# Patient Record
Sex: FEMALE | Race: WHITE | Employment: PART TIME | URBAN - NONMETROPOLITAN AREA
[De-identification: names, ages, dates, MRNs, and addresses within clinical notes are randomized per-mention and may not be internally consistent; named-entity substitution may affect disease eponyms.]

---

## 2017-01-06 ENCOUNTER — OFFICE VISIT (OUTPATIENT)
Dept: FAMILY MEDICINE | Facility: OTHER | Age: 37
End: 2017-01-06
Attending: FAMILY MEDICINE
Payer: COMMERCIAL

## 2017-01-06 VITALS
HEART RATE: 110 BPM | SYSTOLIC BLOOD PRESSURE: 104 MMHG | HEIGHT: 65 IN | DIASTOLIC BLOOD PRESSURE: 64 MMHG | WEIGHT: 158 LBS | BODY MASS INDEX: 26.33 KG/M2 | TEMPERATURE: 98.3 F

## 2017-01-06 DIAGNOSIS — M79.604 BILATERAL LEG PAIN: ICD-10-CM

## 2017-01-06 DIAGNOSIS — F41.1 GAD (GENERALIZED ANXIETY DISORDER): ICD-10-CM

## 2017-01-06 DIAGNOSIS — M79.605 BILATERAL LEG PAIN: ICD-10-CM

## 2017-01-06 DIAGNOSIS — R00.2 PALPITATIONS: Primary | ICD-10-CM

## 2017-01-06 PROCEDURE — 72100 X-RAY EXAM L-S SPINE 2/3 VWS: CPT | Mod: TC | Performed by: RADIOLOGY

## 2017-01-06 PROCEDURE — 99215 OFFICE O/P EST HI 40 MIN: CPT | Performed by: FAMILY MEDICINE

## 2017-01-06 ASSESSMENT — PAIN SCALES - GENERAL: PAINLEVEL: NO PAIN (1)

## 2017-01-06 ASSESSMENT — ANXIETY QUESTIONNAIRES
2. NOT BEING ABLE TO STOP OR CONTROL WORRYING: MORE THAN HALF THE DAYS
IF YOU CHECKED OFF ANY PROBLEMS ON THIS QUESTIONNAIRE, HOW DIFFICULT HAVE THESE PROBLEMS MADE IT FOR YOU TO DO YOUR WORK, TAKE CARE OF THINGS AT HOME, OR GET ALONG WITH OTHER PEOPLE: SOMEWHAT DIFFICULT
6. BECOMING EASILY ANNOYED OR IRRITABLE: MORE THAN HALF THE DAYS
5. BEING SO RESTLESS THAT IT IS HARD TO SIT STILL: MORE THAN HALF THE DAYS
7. FEELING AFRAID AS IF SOMETHING AWFUL MIGHT HAPPEN: MORE THAN HALF THE DAYS
GAD7 TOTAL SCORE: 14
3. WORRYING TOO MUCH ABOUT DIFFERENT THINGS: MORE THAN HALF THE DAYS
1. FEELING NERVOUS, ANXIOUS, OR ON EDGE: MORE THAN HALF THE DAYS

## 2017-01-06 ASSESSMENT — PATIENT HEALTH QUESTIONNAIRE - PHQ9: 5. POOR APPETITE OR OVEREATING: MORE THAN HALF THE DAYS

## 2017-01-06 NOTE — PROGRESS NOTES
"  SUBJECTIVE:                                                    Joaquina Perez is a 36 year old female who presents to clinic today for the following health issues:    Chief Complaint   Patient presents with     [facility]     Pain     Anxiety         New Patient/Transfer of Care  Musculoskeletal problem/pain      Duration: 7-8 months constant past 2 months    Description  Location: bilateral legs    Intensity:  moderate    Accompanying signs and symptoms: burning pain radiates into lower legs    History  Previous similar problem: no   Previous evaluation:  none    Precipitating or alleviating factors:  Trauma or overuse: YES- was bitten by a dog this summer, anxiety makes pain worse  Aggravating factors include: sitting and laying    Therapies tried and outcome: exercising    Bilateral posterior leg pain -- hamstring and quad with periodically to shin pain -- always bilateral.  Worse with laying and sitting a lot-- notice it more afterwards  Not bothersome with laying - can sleep - noticeable   Has anxiety - in nursing and Googling-- increase anxiety due to pain  Got bit last year and dog is alive - but convinced today - has rabies  Burning sensation hamstring and into buttocks  No trauma  No weakness in legs but time overall feels week.   No back pain       With increase anxiety - get palpations- h/o panic attacks  Hospitalized in psych unit for anxiety and did day treatment   Was on lexapro--- \"does not want to be on meds\"- stopped it.  Wants to get pregnant      Sleeps well   No RLS sx - has FH of RLS and Parkinsons  Some FH of peripheral neuropathy           Problem list and histories reviewed & adjusted, as indicated.  Additional history: as documented    Problem list, Medication list, Allergies, and Medical/Social/Surgical histories reviewed in EPIC and updated as appropriate.    ROS:  C: NEGATIVE for fever, chills, change in weight  E/M: NEGATIVE for ear, mouth and throat problems  R: NEGATIVE " "for significant cough or SOB  CV: NEGATIVE for chest pain, palpitations or peripheral edema  ROS otherwise negative    OBJECTIVE:                                                    /64 mmHg  Pulse 110  Temp(Src) 98.3  F (36.8  C)  Ht 5' 4.5\" (1.638 m)  Wt 158 lb (71.668 kg)  BMI 26.71 kg/m2  Body mass index is 26.71 kg/(m^2).   GENERAL: healthy, alert, well nourished, well hydrated, no distress  HENT: ear canals- normal; TMs- normal; Nose- normal; Mouth- no ulcers, no lesions  NECK: no tenderness, no adenopathy, no asymmetry, no masses, no stiffness; thyroid- normal to palpation  RESP: lungs clear to auscultation - no rales, no rhonchi, no wheezes  CV: regular rates and rhythm, normal S1 S2, no S3 or S4 and no murmur, no click or rub -  ABDOMEN: soft, no tenderness, no  hepatosplenomegaly, no masses, normal bowel sounds  Back normal- no pain.   MS: extremities- no gross deformities noted, no edema- very normal strength/reflex/pulses in BLE   PSYCH: Alert and oriented times 3; speech- coherent , normal rate and volume; able to articulate logical thoughts, able to abstract reason, no tangential thoughts, no hallucinations or delusions, affect- anxious- good insight on her anxiety          ASSESSMENT/PLAN:                                                    (R00.2) Palpitations  (primary encounter diagnosis)  Comment: due to anxiety   Plan: First start SSRI.  Consider adjuctive therapy - BB/Tenex may help. Though need to be aware she wants to be on no meds and wishes to get pregnant.     (M79.604,  M79.605) Bilateral leg pain  Comment: Etiology unclear- labs normal-- worse due to anxiety ..  R/o sciatica/lumbar DDD causing pain.   Plan: XR Lumbar Spine 2/3 Views, MR Lumbar Spine w/o         Contrast        Will w/u with XR/MRI.  Consider PT/Neuro appt. Meds like cymbalta or neurontin may also help PEYTON -- pregnancy in future would be issue.  No s/s of MS seen.      (F41.1) PEYTON (generalized anxiety " disorder)  Comment: Big issue- long h/o since 18 yO.  Has been hospital twice - had outpt counseling.   Plan: sertraline (ZOLOFT) 50 MG tablet        Pt agreed to zoloft - safe in pregnancy.  Will go to moderate dose of 100mg.   Consider restarting counseling.  F/u in 3-4 weeks.     50 minutes was spent with patient and over 50%  of this time was spent on counseling patient regarding  illness, medication and / or treatment  options, coordinating further cares and follow ups that are needed along with resource material that will be helpful in the treatment of these issues.       See Patient Instructions    Seymour Alcantar MD  [facility]

## 2017-01-06 NOTE — NURSING NOTE
"Chief Complaint   Patient presents with     Establish Care       Initial /64 mmHg  Pulse 110  Temp(Src) 98.3  F (36.8  C)  Ht 5' 4.5\" (1.638 m)  Wt 158 lb (71.668 kg)  BMI 26.71 kg/m2 Estimated body mass index is 26.71 kg/(m^2) as calculated from the following:    Height as of this encounter: 5' 4.5\" (1.638 m).    Weight as of this encounter: 158 lb (71.668 kg).  BP completed using cuff size: radha Serra      "

## 2017-01-06 NOTE — PATIENT INSTRUCTIONS
Treating Anxiety Disorders with Medication  An anxiety disorder can make you feel nervous or apprehensive, even without a clear reason. Certain anxiety disorders can cause intense feelings of fear or panic. You may even have physical symptoms, such as a racing heartbeat or dizziness. If you have these feelings, you don t have to suffer anymore. Treatment to help you overcome your fears will likely include therapy (also called counseling). Medication may also be prescribed to help control your symptoms.    Medications  Certain medications may be prescribed to help control your symptoms. As a result, you may feel less anxious. You may also feel able to move forward with therapy. At first, medications and dosages may need to be adjusted to find what works best for you. Try to be patient. Tell your health care provider how a medication makes you feel. This way, you can work together to find the treatment that s best for you. Keep in mind that medications can have side effects. Talk to your provider about any side effects that are bothering you. Changing the dose or type of medication may help. Don t stop taking medication on your own because it can cause symptoms to come back.    Anti-anxiety medication: This medication eases symptoms and helps you relax. Your health care provider will explain when and how to use it. It may be prescribed for use before situations that makes you anxious. Or, you may be told to take it on a regular schedule. Anti-anxiety medication may make you feel a little sleepy or  out of it.  Don t drive a car or operate machinery while on this medication, until you know how it affects you.  Caution  Never use alcohol or other drugs with anti-anxiety medications. This could result in loss of muscular control, sedation, coma or death. Also, use only the amount of medication prescribed for you. If you think you may have taken too much, get emergency care right away.     Antidepressant  medication: This kind of medication is often used to treat anxiety, even if you aren t depressed. An antidepressant helps balance out brain chemicals. This helps keep anxiety under control. This medication is taken on a schedule. It takes a few weeks to start working. If you don t notice a change at first, you may just need more time. But if you don t notice results after the first few weeks, tell your provider.  Keep taking medications as prescribed  Never change your dosage or stop taking your medications without talking to your health care provider first. Keep the following in mind:    Some medications must be taken on a schedule. Make this part of your daily routine. For instance, always take your pill before brushing your teeth. A pillbox can help you remember if you ve taken your medication each day.    Medications are often taken for 6 to 12 months. Your health care provider will then evaluate whether you need to stay on them. Many people who have also had therapy may no longer need medication to manage anxiety.    You may need to stop taking medication slowly to give your body time to adjust. When it s time to stop, your health care provider will tell you more. Remember: Never stop taking your medication without talking to your provider first.    If symptoms return, you may need to start taking medications again. This isn t your fault. It s just the nature of your anxiety disorder.  Special concerns    Side effects: Medications may cause side effects. Ask your health care provider or pharmacist what you can expect. They may have ideas for avoiding some side effects.    Sexual problems: Some antidepressants can affect your desire for sex or your ability to have an orgasm. A change in dosage or medication often solves the problem. If you have a sexual side effect that concerns you, tell your health care provider.    Addiction: Antidepressants are not addictive. And if you ve never had a problem with drugs or  alcohol, you likely won t have a problem with anti-anxiety medication. But if you have history of addiction, you may need to avoid this medication.     9303-9028 [employer]. [address] [place] [address], [place] [address]. All rights reserved. This information is not intended as a substitute for professional medical care. Always follow your healthcare professional's instructions.        Treating Anxiety Disorders with Therapy  If you have an anxiety disorder, you don t have to suffer anymore. Treatment is available. Therapy (also called counseling) is often a helpful treatment for anxiety disorders. With therapy, a specially trained professional (therapist) helps you face and learn to manage your anxiety. Therapy can be short-term or long-term depending on your needs. In some cases, medication may also be prescribed with therapy. It may take time before you notice how much therapy is helping, but stick with it. With therapy, you can feel better.    Cognitive behavioral therapy (CBT)  Cognitive behavioral therapy (CBT) teaches you to manage anxiety. It does this by helping you understand how you think and act when you re anxious. Research has shown CBT to be a very effective treatment for anxiety disorders. How CBT is run is almost like a class. It involves homework and activities to build skills that teach you to cope with anxiety step by step. It can be done in a group or one-on-one, and often takes place for a set number of sessions. CBT has two main parts:    Cognitive therapy helps you identify the negative, irrational thoughts that occur with your anxiety. You ll learn to replace these with more positive, realistic thoughts.    Behavioral therapy helps you change how you react to anxiety. You ll learn coping skills and methods for relaxing to help you better deal with anxiety.  Other forms of therapy  Other therapy methods may work better for you than CBT. Or, you may move from CBT to another form of  therapy as your treatment needs change. This may mean meeting with a therapist by yourself or in a group. Therapy can also help you work through problems in your life, such as drug or alcohol dependence, that may be making your anxiety worse.  Getting better takes time  Therapy will help you feel better and teach you skills to help manage anxiety long term. But change doesn t happen right away. It takes a commitment from you. And treatment only works if you learn to face the causes of your anxiety. So, you might feel worse before you feel better. This can sometimes make it hard to stick with it. But remember: Therapy is a very effective treatment. The results will be well worth it.  Helping yourself  If anxiety is wearing you down, here are some things you can do to cope:    Don t fight your feelings. Anxiety feeds itself. The more you worry about it, the worse it gets. Instead, try to identify what might have triggered your anxiety. Then try to put this threat in perspective.    Keep in mind that you can t control everything about a situation. Change what you can and let the rest take its course.    Exercise -- it s a great way to relieve tension and help your body feel relaxed.    Examine your life for stress, and try to find ways to reduce it.    Avoid caffeine and nicotine, which can make anxiety symptoms worse.    Fight the temptation to turn to alcohol or unprescribed drugs for relief. They only make things worse in the long run.     3332-0549 [employer]. [address] [place] [address], [place] [address]. All rights reserved. This information is not intended as a substitute for professional medical care. Always follow your healthcare professional's instructions.        Your Body s Response to Anxiety  Normal anxiety is part of the body s natural defense system. It's an alert to a threat that is unknown, vague, or comes from your own internal fears. While you re in this state, your feelings can range  from a vague sense of worry to physical sensations such as a pounding heartbeat. These feelings make you want to react to the threat. An anxiety response is normal in many situations. But when you have an anxiety disorder, the same response can occur at the wrong times.    Anxiety can be helpful  Normal anxiety is a signal from your brain that warns you of a threat and is a normal response to help you prevent something or decrease the bad effects of something you can't control. For example, anxiety is a normal response to situations that might damage your body, separate you from a loved one, or lose your job. The symptoms of anxiety can be physical and mental.  How does it feel?  At certain times, people with anxiety may have:    Dizziness    Muscle tension or pain    Restlessness    Sleeplessness    Difficulty concentrating    Racing heartbeat    Fast breathing    Shaking or trembling    Stomachache    Diarrhea    Loss of energy    Sweating    Cold, clammy hands    Chest pain    Dry mouth  Anxiety can also be a problem  Anxiety can become a problem when it is difficult to control, occurs for months, and interferes with important parts of your life. With an anxiety disorder, your body has the response described above, but in inappropriate ways. The response a person has depends on the anxiety disorder he or she has. With some disorders, the anxiety is way out of proportion to the threat that triggers it. With others, anxiety may occur even when there isn t a clear threat or trigger.  Who does it affect?  Some people are more prone to persistent anxiety than others. It tends to run in families, and it affects more younger people than older people. But no age, race, or gender is immune to anxiety problems.  Anxiety can be treated  The good news is that the anxiety that s disrupting your life can be treated. Working with your doctor or other healthcare provider, you can develop skills to help you cope with anxiety. You  can also gain the perspective you need to overcome your fears. Note: Good sources of support or guidance can be found at your local hospital, mental health clinic, or an employee assistance program.     If anxiety is wearing you down, here are some things you can do to cope:    Keep in mind that you can t control everything about a situation. Change what you can and let the rest take its course.    Exercise--it s a great way to relieve tension and help your body feel relaxed.    Avoid caffeine and nicotine, which can make anxiety symptoms worse.    Fight the temptation to turn to alcohol or unprescribed drugs for relief. They only make things worse in the long run.     8139-9744 [employer]. [address] [place] [address], [place] [address]. All rights reserved. This information is not intended as a substitute for professional medical care. Always follow your healthcare professional's instructions.

## 2017-01-07 ASSESSMENT — ANXIETY QUESTIONNAIRES: GAD7 TOTAL SCORE: 14

## 2017-01-07 ASSESSMENT — PATIENT HEALTH QUESTIONNAIRE - PHQ9: SUM OF ALL RESPONSES TO PHQ QUESTIONS 1-9: 0

## 2017-01-10 ENCOUNTER — HOSPITAL ENCOUNTER (OUTPATIENT)
Dept: MRI IMAGING | Facility: HOSPITAL | Age: 37
Discharge: HOME | End: 2017-01-10
Attending: FAMILY MEDICINE | Admitting: FAMILY MEDICINE
Payer: COMMERCIAL

## 2017-01-10 PROCEDURE — 72148 MRI LUMBAR SPINE W/O DYE: CPT | Mod: TC

## 2017-01-11 DIAGNOSIS — M79.604 BILATERAL LEG PAIN: Primary | ICD-10-CM

## 2017-01-11 DIAGNOSIS — M79.605 BILATERAL LEG PAIN: Primary | ICD-10-CM

## 2017-01-13 NOTE — PROGRESS NOTES
2017            Dr. Randall Gu    [facility]   [address]   [place]  [address]      Patient:  Fahad Perez   Medical Record #: [id number]   YOB: 1980   Appointment time:  2017 at 9:45 am       RE:  Requesting consultation and possible EMG of the lower extremities.           Bilateral lower extremity pain, myalgias.      Dear Dr. Gu:      Thank you for seeing Joaquina Spain, a 36-year-old female who presents multiple times now with bilateral lower extremity discomfort and pain, myalgias and burning sensation in both legs, mostly from the back down to the knees, usually over the hamstring region.  Etiology is very much unclear, cannot get a good history of why this is going on.  I did go on to get an MRI of her back and it was quite unremarkable.  The patient is very anxious and I think this is playing somewhat of a role in her issue and it is making it even worse because of her anxiety.  Though she is quite concerned, therefore I would like a consult and possible EMG to make sure she does not have any type of neuropathy going on.      I appreciate your help.            Sincerely,      CARLEE NOBLE MD             D: 2017 16:12   T: 2017 18:51   MT: CT      Name:     JOAQUINA CANADA   MRN:      [id number]        Account:      [id number]   :      1980      Document: [id number]

## 2017-01-27 ENCOUNTER — OFFICE VISIT (OUTPATIENT)
Dept: FAMILY MEDICINE | Facility: OTHER | Age: 37
End: 2017-01-27
Attending: FAMILY MEDICINE
Payer: COMMERCIAL

## 2017-01-27 VITALS
WEIGHT: 161 LBS | HEART RATE: 66 BPM | HEIGHT: 65 IN | SYSTOLIC BLOOD PRESSURE: 98 MMHG | BODY MASS INDEX: 26.82 KG/M2 | DIASTOLIC BLOOD PRESSURE: 62 MMHG | TEMPERATURE: 98.1 F

## 2017-01-27 DIAGNOSIS — F43.0 ACUTE REACTION TO STRESS: ICD-10-CM

## 2017-01-27 DIAGNOSIS — F41.1 GAD (GENERALIZED ANXIETY DISORDER): Primary | ICD-10-CM

## 2017-01-27 DIAGNOSIS — M67.442 GANGLION CYST OF FINGER OF LEFT HAND: ICD-10-CM

## 2017-01-27 DIAGNOSIS — M79.604 BILATERAL LEG PAIN: ICD-10-CM

## 2017-01-27 DIAGNOSIS — M79.605 BILATERAL LEG PAIN: ICD-10-CM

## 2017-01-27 PROCEDURE — 99214 OFFICE O/P EST MOD 30 MIN: CPT | Performed by: FAMILY MEDICINE

## 2017-01-27 ASSESSMENT — ANXIETY QUESTIONNAIRES
3. WORRYING TOO MUCH ABOUT DIFFERENT THINGS: SEVERAL DAYS
2. NOT BEING ABLE TO STOP OR CONTROL WORRYING: NOT AT ALL
GAD7 TOTAL SCORE: 5
6. BECOMING EASILY ANNOYED OR IRRITABLE: NEARLY EVERY DAY
7. FEELING AFRAID AS IF SOMETHING AWFUL MIGHT HAPPEN: NOT AT ALL
1. FEELING NERVOUS, ANXIOUS, OR ON EDGE: SEVERAL DAYS
IF YOU CHECKED OFF ANY PROBLEMS ON THIS QUESTIONNAIRE, HOW DIFFICULT HAVE THESE PROBLEMS MADE IT FOR YOU TO DO YOUR WORK, TAKE CARE OF THINGS AT HOME, OR GET ALONG WITH OTHER PEOPLE: NOT DIFFICULT AT ALL
5. BEING SO RESTLESS THAT IT IS HARD TO SIT STILL: NOT AT ALL

## 2017-01-27 ASSESSMENT — PATIENT HEALTH QUESTIONNAIRE - PHQ9: 5. POOR APPETITE OR OVEREATING: NOT AT ALL

## 2017-01-27 ASSESSMENT — PAIN SCALES - GENERAL: PAINLEVEL: MILD PAIN (2)

## 2017-01-27 NOTE — PROGRESS NOTES
"  SUBJECTIVE:                                                    Joaquina Perez is a 36 year old female who presents to clinic today for the following health issues:      Abnormal Mood Symptoms      Duration: year    Description:  Depression: no  Anxiety: YES  Panic attacks: no     Accompanying signs and symptoms: see PHQ-9 and PEYTON scores    History (similar episodes/previous evaluation): None    Precipitating or alleviating factors: None    Therapies tried and outcome: Zoloft (Sertraline) and atarax    Had insurance issue andthen did not start Zoloft yet- has at home and intends to start   PHQ-9 SCORE 12/20/2016 1/6/2017 1/27/2017   Total Score - - -   Total Score 2 0 1     PEYTON-7 SCORE 12/20/2016 1/6/2017 1/27/2017   Total Score 6 14 5              Lump in finger      Duration: 1 year    Description (location/character/radiation): left pinky finger    Intensity:  mild    Accompanying signs and symptoms: none    History (similar episodes/previous evaluation): None    Precipitating or alleviating factors: None    Therapies tried and outcome: None       Pt is having less leg pain with chiropractor and changes in school and home work.  Doing more stretches and exercises.     Problem list and histories reviewed & adjusted, as indicated.  Additional history: as documented    Problem list, Medication list, Allergies, and Medical/Social/Surgical histories reviewed in EPIC and updated as appropriate.    ROS:  C: NEGATIVE for fever, chills, change in weight    OBJECTIVE:                                                    BP 98/62 mmHg  Pulse 66  Temp(Src) 98.1  F (36.7  C)  Ht 5' 4.5\" (1.638 m)  Wt 161 lb (73.029 kg)  BMI 27.22 kg/m2  Body mass index is 27.22 kg/(m^2).   GENERAL: healthy, alert, well nourished, well hydrated, no distress  MS: extremities- no gross deformities noted, no edema left 5th finger 5-7 mm cystic lesion possible ganglion cyst   PSYCH: Alert and oriented times 3; speech- coherent , normal " rate and volume; able to articulate logical thoughts, able to abstract reason, no tangential thoughts, no hallucinations or delusions, affect- normal- much less anxious         ASSESSMENT/PLAN:                                                      (F41.1) PEYTON (generalized anxiety disorder)  (primary encounter diagnosis)  Comment: improved some   Plan: Still think Zoloft would be helpful.  Pt to start and see back in 6-8 weeks    (F43.0) Acute reaction to stress  Comment: improved   Plan: Symptomatic treatment was discussed along when patient should call and/or come back into the clinic or go to ER/Urgent care. All questions answered.   No counseling wanted    (M79.604,  M79.605) Bilateral leg pain  Comment: improved with some changes in sitting and going to chiropractor.   Plan: Continue current medications and behavioral changes.   Discussed in length conservative measures of OTC medications for pain, Ice/Heat, elevation and the concept of R.I.C.E.. Continue behavioral changes and proper body mechanics to allow for healing. Follow up as directed.   Possible  she will cancer EMG- i support that     (M63.892) Ganglion cyst of finger of left hand  Comment: discussed at length - benign   Plan: Reassurance       See Patient Instructions    Seymour Alcantar MD  [facility]

## 2017-01-27 NOTE — NURSING NOTE
"Chief Complaint   Patient presents with     Anxiety     Mass       Initial BP 98/62 mmHg  Pulse 66  Temp(Src) 98.1  F (36.7  C)  Ht 5' 4.5\" (1.638 m)  Wt 161 lb (73.029 kg)  BMI 27.22 kg/m2 Estimated body mass index is 27.22 kg/(m^2) as calculated from the following:    Height as of this encounter: 5' 4.5\" (1.638 m).    Weight as of this encounter: 161 lb (73.029 kg).  BP completed using cuff size: radha Serra      "

## 2017-01-28 ASSESSMENT — ANXIETY QUESTIONNAIRES: GAD7 TOTAL SCORE: 5

## 2017-01-28 ASSESSMENT — PATIENT HEALTH QUESTIONNAIRE - PHQ9: SUM OF ALL RESPONSES TO PHQ QUESTIONS 1-9: 1

## 2017-02-17 ENCOUNTER — TELEPHONE (OUTPATIENT)
Dept: OBGYN | Facility: OTHER | Age: 37
End: 2017-02-17

## 2017-02-17 NOTE — TELEPHONE ENCOUNTER
Positive pregnancy test : yes  LMP : 1/21/17 GA: 3 weeks and 6 days  Prenatal vitamins?: yes  Bleeding?: no  Cramping?: no  1-sided pelvic pain?: no  Advised patient to be seen ASAP if any of the above symptoms.  [facility] appt scheduled with Dr. Jacobs on 3/6/17 at 1:50.

## 2017-03-06 ENCOUNTER — PRENATAL OFFICE VISIT (OUTPATIENT)
Dept: OBGYN | Facility: OTHER | Age: 37
End: 2017-03-06
Attending: OBSTETRICS & GYNECOLOGY
Payer: COMMERCIAL

## 2017-03-06 VITALS
BODY MASS INDEX: 27.66 KG/M2 | DIASTOLIC BLOOD PRESSURE: 64 MMHG | HEART RATE: 76 BPM | WEIGHT: 166 LBS | HEIGHT: 65 IN | SYSTOLIC BLOOD PRESSURE: 104 MMHG

## 2017-03-06 DIAGNOSIS — Z32.01 PREGNANCY TEST POSITIVE: Primary | ICD-10-CM

## 2017-03-06 PROBLEM — O09.521 ELDERLY MULTIGRAVIDA IN FIRST TRIMESTER: Status: ACTIVE | Noted: 2017-03-06

## 2017-03-06 PROCEDURE — 76817 TRANSVAGINAL US OBSTETRIC: CPT | Performed by: OBSTETRICS & GYNECOLOGY

## 2017-03-06 PROCEDURE — 99207 ZZC PRENATAL VISIT: CPT | Mod: 25 | Performed by: OBSTETRICS & GYNECOLOGY

## 2017-03-06 NOTE — MR AVS SNAPSHOT
After Visit Summary   3/6/2017    Joaquina Perez    MRN: [id number]           Patient Information     Date Of Birth          1980        Visit Information        Provider Department      3/6/2017 1:50 PM Geetha Jacobs MD [facility]        Today's Diagnoses     Pregnancy test positive    -  1      Care Instructions    Return in 1-2 weeks for a short appointment, and in 6 weeks for a long New OB appt.  Plan wet prep and cell-free DNA tests after 10 weeks.           Follow-ups after your visit        Your next 10 appointments already scheduled     Mar 07, 2017  9:10 AM CST   (Arrive by 8:55 AM)   SHORT with MD Ilana Sinhaview [place] ([facility] [place] [facility])    [address]  [place] [address]   [phone]            Mar 17, 2017  9:30 AM CDT   (Arrive by 9:15 AM)   Office Visit with Seymour Alcantar MD   [facility] [place] ([facility] [place] [facility])    [address] [place] [address]   [phone]           Bring a current list of meds and any records pertaining to this visit.  For Physicals, please bring immunization records and any forms needing to be filled out.  Please arrive 10 minutes early to complete paperwork.            Feb 07, 2018 10:00 AM CST   (Arrive by 9:45 AM)   PHYSICAL with MD Aleks Sinha [place] ([facility] [place] [facility])    [address]  [place] [address]   [phone]              Who to contact     If you have questions or need follow up information about today's clinic visit or your schedule please [facility] at [phone].  Normal or non-critical lab and imaging results will be communicated to you by MyChart, letter or phone within 4 business days after the clinic has received the results. If you do not hear from us within 7 days, please contact the clinic through MyChart or phone. If you have a critical or abnormal lab result, we will notify you by phone as soon as  "possible.  Submit refill requests through [email or web address] or call your pharmacy and they will forward the refill request to us. Please allow 3 business days for your refill to be completed.          Additional Information About Your Visit        [employer] Information     [email or web address] gives you secure access to your electronic health record. If you see a primary care provider, you can also send messages to your care team and make appointments. If you have questions, please call your primary care clinic.  If you do not have a primary care provider, please call [phone] and they will assist you.        Care EveryWhere ID     This is your Care EveryWhere ID. This could be used by other organizations to access your [place] medical records  [id number]        Your Vitals Were     Pulse Height Last Period BMI (Body Mass Index)          76 5' 4.5\" (1.638 m) 01/21/2017 28.05 kg/m2         Blood Pressure from Last 3 Encounters:   03/06/17 104/64   01/27/17 98/62   01/06/17 104/64    Weight from Last 3 Encounters:   03/06/17 166 lb (75.3 kg)   01/27/17 161 lb (73 kg)   01/06/17 158 lb (71.7 kg)              We Performed the Following     US OB (Clinic Only)        Primary Care Provider Office Phone # Fax #    Seymour Alcantar [phone]       [facility] [address] [facility] [address]        Thank you!     Thank you for choosing [facility]  for your care. Our goal is always to provide you with excellent care. Hearing back from our patients is one way we can continue to improve our services. Please take a few minutes to complete the written survey that you may receive in the mail after your visit with us. Thank you!             Your Updated Medication List - Protect others around you: Learn how to safely use, store and throw away your medicines at www.disposemymeds.org.          This list is accurate as of: 3/6/17  2:43 PM.  Always use your most recent med list.                   Brand Name " Dispense Instructions for use    calcium carbonate 500 MG tablet    OS-TANIA 500 mg [place]. Ca     Take 600 mg by mouth daily       OMEGA-3 FISH OIL PO          prenatal multivitamin  plus iron 27-0.8 MG Tabs per tablet      Take 1 tablet by mouth daily       VITAMIN D3 PO      Take 800 Units by mouth daily

## 2017-03-06 NOTE — PROGRESS NOTES
"Here for doc of dates and viability  /64  Pulse 76  Ht 5' 4.5\" (1.638 m)  Wt 166 lb (75.3 kg)  LMP 2017  BMI 28.05 kg/m2      Uterus:many calcificartions  Gest. Sac: reg 5w3d  Yolk Sac: n  Fetal Pole: n  FHT: n  Fluid: normal  Adnexa: normal  Gest Age: 5w3d  Sc less than dates  Done by Geetha Jacobs MD    A: probable IUP in AMA  Hx     P: rto 1 wk  rto NOB  Plan wet prep and cell free DNA after 10 wks    Greater than  25 minutes were spent counseling this patient face to face in addition to the ultrasound.      "

## 2017-03-06 NOTE — PATIENT INSTRUCTIONS
Return in 1-2 weeks for a short appointment, and in 6 weeks for a long New OB appt.  Plan wet prep and cell-free DNA tests after 10 weeks.

## 2017-03-13 ENCOUNTER — PRENATAL OFFICE VISIT (OUTPATIENT)
Dept: OBGYN | Facility: OTHER | Age: 37
End: 2017-03-13
Attending: OBSTETRICS & GYNECOLOGY
Payer: COMMERCIAL

## 2017-03-13 ENCOUNTER — MEDICAL CORRESPONDENCE (OUTPATIENT)
Dept: HEALTH INFORMATION MANAGEMENT | Facility: CLINIC | Age: 37
End: 2017-03-13

## 2017-03-13 VITALS
BODY MASS INDEX: 27.32 KG/M2 | WEIGHT: 164 LBS | SYSTOLIC BLOOD PRESSURE: 112 MMHG | DIASTOLIC BLOOD PRESSURE: 64 MMHG | HEIGHT: 65 IN

## 2017-03-13 DIAGNOSIS — Z98.891 HISTORY OF CESAREAN SECTION: ICD-10-CM

## 2017-03-13 DIAGNOSIS — Z30.09 ENCOUNTER FOR OTHER GENERAL COUNSELING OR ADVICE ON CONTRACEPTION: Primary | ICD-10-CM

## 2017-03-13 DIAGNOSIS — O09.521 ELDERLY MULTIGRAVIDA IN FIRST TRIMESTER: ICD-10-CM

## 2017-03-13 PROBLEM — N76.0 BV (BACTERIAL VAGINOSIS): Status: ACTIVE | Noted: 2017-03-13

## 2017-03-13 PROBLEM — B96.89 BV (BACTERIAL VAGINOSIS): Status: ACTIVE | Noted: 2017-03-13

## 2017-03-13 LAB
ABO + RH BLD: NORMAL
ABO + RH BLD: NORMAL
BLD GP AB SCN SERPL QL: NORMAL
BLOOD BANK CMNT PATIENT-IMP: NORMAL
BLOOD BANK CMNT PATIENT-IMP: NORMAL
C TRACH DNA SPEC QL PROBE+SIG AMP: NORMAL
ERYTHROCYTE [DISTWIDTH] IN BLOOD BY AUTOMATED COUNT: 11.8 % (ref 10–15)
HCT VFR BLD AUTO: 34.1 % (ref 35–47)
HGB BLD-MCNC: 12 G/DL (ref 11.7–15.7)
MCH RBC QN AUTO: 31.8 PG (ref 26.5–33)
MCHC RBC AUTO-ENTMCNC: 35.2 G/DL (ref 31.5–36.5)
MCV RBC AUTO: 91 FL (ref 78–100)
N GONORRHOEA DNA SPEC QL PROBE+SIG AMP: NORMAL
PLATELET # BLD AUTO: 217 10E9/L (ref 150–450)
RBC # BLD AUTO: 3.77 10E12/L (ref 3.8–5.2)
SPECIMEN EXP DATE BLD: NORMAL
TSH SERPL DL<=0.05 MIU/L-ACNC: 1.54 MU/L (ref 0.4–4)
WBC # BLD AUTO: 6 10E9/L (ref 4–11)

## 2017-03-13 PROCEDURE — 84443 ASSAY THYROID STIM HORMONE: CPT | Performed by: OBSTETRICS & GYNECOLOGY

## 2017-03-13 PROCEDURE — 86850 RBC ANTIBODY SCREEN: CPT | Performed by: OBSTETRICS & GYNECOLOGY

## 2017-03-13 PROCEDURE — 86900 BLOOD TYPING SEROLOGIC ABO: CPT | Performed by: OBSTETRICS & GYNECOLOGY

## 2017-03-13 PROCEDURE — 86762 RUBELLA ANTIBODY: CPT | Mod: 90 | Performed by: OBSTETRICS & GYNECOLOGY

## 2017-03-13 PROCEDURE — 76817 TRANSVAGINAL US OBSTETRIC: CPT | Performed by: OBSTETRICS & GYNECOLOGY

## 2017-03-13 PROCEDURE — 86901 BLOOD TYPING SEROLOGIC RH(D): CPT | Performed by: OBSTETRICS & GYNECOLOGY

## 2017-03-13 PROCEDURE — 85027 COMPLETE CBC AUTOMATED: CPT | Performed by: OBSTETRICS & GYNECOLOGY

## 2017-03-13 PROCEDURE — 87340 HEPATITIS B SURFACE AG IA: CPT | Mod: 90 | Performed by: OBSTETRICS & GYNECOLOGY

## 2017-03-13 PROCEDURE — 87389 HIV-1 AG W/HIV-1&-2 AB AG IA: CPT | Mod: 90 | Performed by: OBSTETRICS & GYNECOLOGY

## 2017-03-13 PROCEDURE — 36415 COLL VENOUS BLD VENIPUNCTURE: CPT | Performed by: OBSTETRICS & GYNECOLOGY

## 2017-03-13 PROCEDURE — 86780 TREPONEMA PALLIDUM: CPT | Mod: 90 | Performed by: OBSTETRICS & GYNECOLOGY

## 2017-03-13 PROCEDURE — 99207 ZZC COMPLICATED OB VISIT: CPT | Performed by: OBSTETRICS & GYNECOLOGY

## 2017-03-13 PROCEDURE — 99000 SPECIMEN HANDLING OFFICE-LAB: CPT | Performed by: OBSTETRICS & GYNECOLOGY

## 2017-03-13 NOTE — PROGRESS NOTES
Here for doc of dates and viability    Uterus: normal  Gest. Sac: reg  Yolk Sac: p  Fetal Pole: 6w3d  FHT: +  Fluid: normal  Gest Age: 6w3d  Sc with no dates  Done by Geetha Jacobs MD    A: IUP in AMA with hx BV and previous     P: rto 1 wk  rto NOB  Prenatal labs,tsh  Can use 2015 gc,ct  Cell free DNA after 10 wks  Level II for placentation 20 wks  msafp 16 wks        Greater than  25 minutes were spent counseling this patient face to face in addition to the ultrasound.

## 2017-03-13 NOTE — PATIENT INSTRUCTIONS
Labs today.  Cell-Free DNA after 10 weeks.  MSAFP at 15-16 weeks.  Level 2 ultrasound @ 18-22 weeks. You will receive a call from ultrasound to make this appt. Please contact the nurse if you have not been contacted with an appointment time by 17 weeks.  Return in 1-2 weeks for a short appointment, and in 4-6 weeks for a long New OB appt.

## 2017-03-13 NOTE — MR AVS SNAPSHOT
After Visit Summary   3/13/2017    Joaquina Perez    MRN: [id number]           Patient Information     Date Of Birth          1980        Visit Information        Provider Department      3/13/2017 3:20 PM Geetha Jacobs MD [facility]        Today's Diagnoses     Encounter for other general counseling or advice on contraception    -  1    Elderly multigravida in first trimester          Care Instructions    Labs today.  Cell-Free DNA after 10 weeks.  MSAFP at 15-16 weeks.  Level 2 ultrasound @ 18-22 weeks. You will receive a call from ultrasound to make this appt. Please contact the nurse if you have not been contacted with an appointment time by 17 weeks.  Return in -2 weeks for a short appointment, and in 4-6 weeks for a long New OB appt.          Follow-ups after your visit        Your next 10 appointments already scheduled     Mar 17, 2017  9:30 AM CDT   (Arrive by 9:15 AM)   Office Visit with Seymour Alcantar MD   [facility] [place] ([facility] [place] [facility])    [address]  [place] [address]   [phone]           Bring a current list of meds and any records pertaining to this visit.  For Physicals, please bring immunization records and any forms needing to be filled out.  Please arrive 10 minutes early to complete paperwork.            Apr 19, 2017  3:30 PM CDT   (Arrive by 3:15 PM)   [facility] [place] [facility] [place] [facility]    [address]  [place] [address]   [phone]            Feb 07, 2018 10:00 AM CST   (Arrive by 9:45 AM)   [facility] [place] [facility] [facility]    [address]  [place] [address]   [phone]              Who to contact     If you have questions or need follow up information about today's clinic visit or your schedule please contact [facility] directly at [phone].  Normal or non-critical lab and imaging results will  "be communicated to you by MyChart, letter or phone within 4 business days after the clinic has received the results. If you do not hear from us within 7 days, please contact the clinic through Yottaa or phone. If you have a critical or abnormal lab result, we will notify you by phone as soon as possible.  Submit refill requests through [employer] or call your pharmacy and they will forward the refill request to us. Please allow 3 business days for your refill to be completed.          Additional Information About Your Visit        [employer] Information     Yottaa gives you secure access to your electronic health record. If you see a primary care provider, you can also send messages to your care team and make appointments. If you have questions, please call your primary care clinic.  If you do not have a primary care provider, please call [phone] and they will assist you.        Care EveryWhere ID     This is your Care EveryWhere ID. This could be used by other organizations to access your [place] medical records  [id number]        Your Vitals Were     Height BMI (Body Mass Index)                5' 4.5\" (1.638 m) 27.72 kg/m2           Blood Pressure from Last 3 Encounters:   03/13/17 112/64   03/06/17 104/64   01/27/17 98/62    Weight from Last 3 Encounters:   03/13/17 164 lb (74.4 kg)   03/06/17 166 lb (75.3 kg)   01/27/17 161 lb (73 kg)              We Performed the Following     ABO/Rh type and screen     Anti Treponema     CBC with platelets     Hepatitis B surface antigen     HIV Antigen Antibody Combo     Rubella Antibody IgG Quantitative     TSH     UA with Microscopic reflex to Culture     US OB (Clinic Only)        Primary Care Provider Office Phone # Fax #    Seymour Alcantar [phone]       [facility] [address] [facility] [place] [address]        Thank you!     Thank you for choosing [facility]  for your care. Our goal is always to provide you with excellent " care. Hearing back from our patients is one way we can continue to improve our services. Please take a few minutes to complete the written survey that you may receive in the mail after your visit with us. Thank you!             Your Updated Medication List - Protect others around you: Learn how to safely use, store and throw away your medicines at www.disposemymeds.org.          This list is accurate as of: 3/13/17  4:15 PM.  Always use your most recent med list.                   Brand Name Dispense Instructions for use    calcium carbonate 500 MG tablet    OS-TANIA 500 mg [place]. Ca     Take 600 mg by mouth daily       OMEGA-3 FISH OIL PO          prenatal multivitamin  plus iron 27-0.8 MG Tabs per tablet      Take 1 tablet by mouth daily       VITAMIN D3 PO      Take 800 Units by mouth daily

## 2017-03-15 LAB
HBV SURFACE AG SERPL QL IA: NONREACTIVE
HIV 1+2 AB+HIV1 P24 AG SERPL QL IA: NORMAL
RUBV IGG SERPL IA-ACNC: 22 IU/ML
T PALLIDUM IGG+IGM SER QL: NEGATIVE

## 2017-03-16 ENCOUNTER — TELEPHONE (OUTPATIENT)
Dept: FAMILY MEDICINE | Facility: OTHER | Age: 37
End: 2017-03-16

## 2017-03-16 NOTE — TELEPHONE ENCOUNTER
12:14 PM    Reason for Call: Phone Call    Description: Pt called and wants to know if she should make another follow up with Dr. Alcantar regarding her Zolaf she wants to know if she has to follow up still with him on this even if she is pregnant now and she stopped taking it, she would like a call back.     Was an appointment offered for this call? No    Preferred method for responding to this message: Telephone Loxc-[phone]    If we cannot reach you directly, may we leave a detailed response at the number you provided? Yes    Can this message wait until your PCP/provider returns, if available today? Not applicable,     Astrid Jim

## 2017-03-16 NOTE — TELEPHONE ENCOUNTER
Thanks for calling and if doing well. i do not need to see.  To note Zoloft is safe in pregnancy if needed it. F/u prn.   Congrats on pregnancy !!

## 2017-03-22 ENCOUNTER — PRENATAL OFFICE VISIT (OUTPATIENT)
Dept: OBGYN | Facility: OTHER | Age: 37
End: 2017-03-22
Attending: OBSTETRICS & GYNECOLOGY
Payer: COMMERCIAL

## 2017-03-22 VITALS
BODY MASS INDEX: 26.82 KG/M2 | SYSTOLIC BLOOD PRESSURE: 100 MMHG | DIASTOLIC BLOOD PRESSURE: 62 MMHG | HEIGHT: 65 IN | WEIGHT: 161 LBS

## 2017-03-22 DIAGNOSIS — O09.521 ELDERLY MULTIGRAVIDA IN FIRST TRIMESTER: Primary | ICD-10-CM

## 2017-03-22 LAB
ALBUMIN UR-MCNC: NEGATIVE MG/DL
APPEARANCE UR: CLEAR
BACTERIA #/AREA URNS HPF: ABNORMAL /HPF
BILIRUB UR QL STRIP: NEGATIVE
COLOR UR AUTO: ABNORMAL
GLUCOSE UR STRIP-MCNC: NEGATIVE MG/DL
HGB UR QL STRIP: NEGATIVE
KETONES UR STRIP-MCNC: NEGATIVE MG/DL
LEUKOCYTE ESTERASE UR QL STRIP: NEGATIVE
NITRATE UR QL: NEGATIVE
PH UR STRIP: 6.5 PH (ref 4.7–8)
RBC #/AREA URNS AUTO: 0 /HPF (ref 0–2)
SP GR UR STRIP: 1 (ref 1–1.03)
URN SPEC COLLECT METH UR: ABNORMAL
UROBILINOGEN UR STRIP-MCNC: NORMAL MG/DL (ref 0–2)
WBC #/AREA URNS AUTO: <1 /HPF (ref 0–2)

## 2017-03-22 PROCEDURE — 99207 ZZC COMPLICATED OB VISIT: CPT | Mod: 25 | Performed by: OBSTETRICS & GYNECOLOGY

## 2017-03-22 PROCEDURE — 76817 TRANSVAGINAL US OBSTETRIC: CPT | Performed by: OBSTETRICS & GYNECOLOGY

## 2017-03-22 PROCEDURE — 81001 URINALYSIS AUTO W/SCOPE: CPT | Performed by: OBSTETRICS & GYNECOLOGY

## 2017-03-22 NOTE — PROGRESS NOTES
Here for doc of dates and viability    Uterus: normal  Gest. Sac: reg  Yolk Sac: p  Fetal Pole: 8w2d  FHT: +  Fluid: normal  Gest Age: reg  Sc =sc with no dates  Done by Geetha Jacobs MD    A: erlina iup    P: patient checking on Zika for SO  Cell free DNA after 10 wks  rto 1 wk  Note for separate testing written    Greater than  25 minutes were spent counseling this patient face to face in addition to the ultrasound.

## 2017-03-22 NOTE — MR AVS SNAPSHOT
After Visit Summary   3/22/2017    Joaquina Perez    MRN: [id number]           Patient Information     Date Of Birth          1980        Visit Information        Provider Department      3/22/2017 10:20 AM Geetha Jacobs MD [facility]        Today's Diagnoses     Elderly multigravida in first trimester    -  1      Care Instructions    Return to clinic in 1 week.          Follow-ups after your visit        Your next 10 appointments already scheduled     Mar 29, 2017  9:50 AM CDT   (Arrive by 9:35 AM)   ESTABLISHED PRENATAL with Geetha Jacobs MD   [facility] ([facility])    [address] [place] [address]   [phone]            Apr 19, 2017  3:30 PM CDT   (Arrive by 3:15 PM)   New Prenatal with Geetha Jacobs MD   [facility] ([facility])    [address] [place] [address]   [phone]            Jun 20, 2017  9:00 AM CDT   Radiology with HI ULTRASOUND 2   [facility] ([facility] )    [address] [place] [address]   [phone]            Feb 07, 2018 10:00 AM CST   (Arrive by 9:45 AM)   PHYSICAL with Geetha Jacobs MD   [facility]    [address] [place] [address]   [phone]              Who to contact     If you have questions or need follow up information about today's clinic visit or your schedule please contact [facility] directly at [phone].  Normal or non-critical lab and imaging results will be communicated to you by MyChart, letter or phone within 4 business days after the clinic has received the results. If you do not hear from us within 7 days, please contact the clinic through [employer] or phone. If you have a critical or abnormal lab result, we will notify you by phone as soon as possible.  Submit refill requests through [employer] or call your pharmacy and they will forward the refill request to us. Please allow 3  "business days for your refill to be completed.          Additional Information About Your Visit        MyChart Information     [employer] gives you secure access to your electronic health record. If you see a primary care provider, you can also send messages to your care team and make appointments. If you have questions, please call your primary care clinic.  If you do not have a primary care provider, please call [phone] and they will assist you.        Care EveryWhere ID     This is your Care EveryWhere ID. This could be used by other organizations to access your [place] medical records  [id number]        Your Vitals Were     Height BMI (Body Mass Index)                5' 4.5\" (1.638 m) 27.21 kg/m2           Blood Pressure from Last 3 Encounters:   03/22/17 100/62   03/13/17 112/64   03/06/17 104/64    Weight from Last 3 Encounters:   03/22/17 161 lb (73 kg)   03/13/17 164 lb (74.4 kg)   03/06/17 166 lb (75.3 kg)              We Performed the Following     UA with Microscopic reflex to Culture     US OB (Clinic Only)        Primary Care Provider Office Phone # Fax #    Seymour Alcantar [phone]       [facility] [address] [facility]  [place] [address]        Thank you!     Thank you for choosing [facility]  for your care. Our goal is always to provide you with excellent care. Hearing back from our patients is one way we can continue to improve our services. Please take a few minutes to complete the written survey that you may receive in the mail after your visit with us. Thank you!             Your Updated Medication List - Protect others around you: Learn how to safely use, store and throw away your medicines at www.disposemymeds.org.          This list is accurate as of: 3/22/17  4:28 PM.  Always use your most recent med list.                   Brand Name Dispense Instructions for use    calcium carbonate 500 MG tablet    OS-TANIA 500 mg [place]. Ca     Take 600 mg by " mouth daily       OMEGA-3 FISH OIL PO          prenatal multivitamin  plus iron 27-0.8 MG Tabs per tablet      Take 1 tablet by mouth daily       VITAMIN D3 PO      Take 800 Units by mouth daily

## 2017-03-29 ENCOUNTER — HOSPITAL ENCOUNTER (OUTPATIENT)
Dept: ULTRASOUND IMAGING | Facility: HOSPITAL | Age: 37
Discharge: HOME | End: 2017-03-29
Attending: OBSTETRICS & GYNECOLOGY | Admitting: OBSTETRICS & GYNECOLOGY
Payer: COMMERCIAL

## 2017-03-29 ENCOUNTER — PRENATAL OFFICE VISIT (OUTPATIENT)
Dept: OBGYN | Facility: OTHER | Age: 37
End: 2017-03-29
Attending: OBSTETRICS & GYNECOLOGY
Payer: COMMERCIAL

## 2017-03-29 VITALS
BODY MASS INDEX: 26.99 KG/M2 | HEIGHT: 65 IN | SYSTOLIC BLOOD PRESSURE: 108 MMHG | DIASTOLIC BLOOD PRESSURE: 72 MMHG | OXYGEN SATURATION: 99 % | WEIGHT: 162 LBS | HEART RATE: 79 BPM

## 2017-03-29 DIAGNOSIS — O02.1 MISSED ABORTION: ICD-10-CM

## 2017-03-29 DIAGNOSIS — O09.521 ELDERLY MULTIGRAVIDA IN FIRST TRIMESTER: Primary | ICD-10-CM

## 2017-03-29 PROCEDURE — 76817 TRANSVAGINAL US OBSTETRIC: CPT | Performed by: OBSTETRICS & GYNECOLOGY

## 2017-03-29 PROCEDURE — 76817 TRANSVAGINAL US OBSTETRIC: CPT | Mod: TC

## 2017-03-29 PROCEDURE — 99215 OFFICE O/P EST HI 40 MIN: CPT | Mod: 25 | Performed by: OBSTETRICS & GYNECOLOGY

## 2017-03-29 RX ORDER — MISOPROSTOL 200 UG/1
400 TABLET ORAL EVERY 4 HOURS PRN
Qty: 8 TABLET | Refills: 0
Start: 2017-03-29 | End: 2017-04-05

## 2017-03-29 ASSESSMENT — PAIN SCALES - GENERAL: PAINLEVEL: NO PAIN (0)

## 2017-03-29 NOTE — NURSING NOTE
"Chief Complaint   Patient presents with     Prenatal Care       Initial /72 (BP Location: Left arm, Patient Position: Chair, Cuff Size: Adult Regular)  Pulse 79  Ht 5' 4.5\" (1.638 m)  Wt 162 lb (73.5 kg)  SpO2 99%  BMI 27.38 kg/m2 Estimated body mass index is 27.38 kg/(m^2) as calculated from the following:    Height as of this encounter: 5' 4.5\" (1.638 m).    Weight as of this encounter: 162 lb (73.5 kg).  Medication Reconciliation: trinity Garner      "

## 2017-03-29 NOTE — PATIENT INSTRUCTIONS
Cytotec 200 mcg tablets given.  Dissolve 2 tablets under tongue every 4 hours x 4 doses when you are ready.  Return to clinic on Monday.

## 2017-03-29 NOTE — PROGRESS NOTES
"Here for doc of dates and viability  /72 (BP Location: Left arm, Patient Position: Chair, Cuff Size: Adult Regular)  Pulse 79  Ht 5' 4.5\" (1.638 m)  Wt 162 lb (73.5 kg)  SpO2 99%  BMI 27.38 kg/m2  A+    Uterus: normal  Gest. Sac: reg  Yolk Sac: p  Fetal Pole: 7-8 wks, fading  FHT: n  Fluid: normal  Gest Age: <dates  Sc < dates  Done by Geetha Jacobs MD    A: SORAYA    P: to radiology  Grieving discussed  Future discussed  Options discussed at length  rto Monday  ED parameters  cytotec 400 mcg SL q 4 hrs x 4 when patient wants  RBAQ's    Greater than  40 minutes were spent counseling this patient face to face in addition to the ultrasound in 2 visits      "

## 2017-03-29 NOTE — MR AVS SNAPSHOT
After Visit Summary   3/29/2017    Joaquina Perez    MRN: [id number]           Patient Information     Date Of Birth          1980        Visit Information        Provider Department      3/29/2017 9:50 AM Geetha Jacobs MD [facility]        Today's Diagnoses     Elderly multigravida in first trimester    -  1    Missed           Care Instructions    Cytotec 200 mcg tablets given.  Dissolve 2 tablets under tongue every 4 hours x 4 doses when you are ready.  Return to clinic on Monday.          Follow-ups after your visit        Your next 10 appointments already scheduled     2017  1:30 PM CDT   (Arrive by 1:15 PM)   SHORT with Geetha Jacobs MD   [facility] ([facility])    [address] [facility] [address]   [phone]            2017  3:30 PM CDT   (Arrive by 3:15 PM)   New Prenatal with Geetha Jacobs MD   [facility] ([facility])    [address] [facility] [address]   [phone]            2017  9:00 AM CDT   Radiology with HI ULTRASOUND 2   [facility] ([facility] )    [address] [place] [address]   [phone]            2018 10:00 AM CST   (Arrive by 9:45 AM)   PHYSICAL with Geetha Jacobs MD   [facility] ([facility])    [address] [place] [address]   [phone]              Who to contact     If you have questions or need follow up information about today's clinic visit or your schedule please contact [facility] directly at [phone].  Normal or non-critical lab and imaging results will be communicated to you by MyChart, letter or phone within 4 business days after the clinic has received the results. If you do not hear from us within 7 days, please contact the clinic through MyChart or phone. If you have a critical or abnormal lab result, we will notify you by phone as soon as possible.  Submit  "refill requests through [employer] or call your pharmacy and they will forward the refill request to us. Please allow 3 business days for your refill to be completed.          Additional Information About Your Visit        [employer] Information     [employer] gives you secure access to your electronic health record. If you see a primary care provider, you can also send messages to your care team and make appointments. If you have questions, please call your primary care clinic.  If you do not have a primary care provider, please call [phone] and they will assist you.        Care EveryWhere ID     This is your Care EveryWhere ID. This could be used by other organizations to access your [place] medical records  [id number]        Your Vitals Were     Pulse Height Pulse Oximetry BMI (Body Mass Index)          79 5' 4.5\" (1.638 m) 99% 27.38 kg/m2         Blood Pressure from Last 3 Encounters:   03/29/17 108/72   03/22/17 100/62   03/13/17 112/64    Weight from Last 3 Encounters:   03/29/17 162 lb (73.5 kg)   03/22/17 161 lb (73 kg)   03/13/17 164 lb (74.4 kg)              We Performed the Following     US OB (Clinic Only)     US OB Transvaginal Only        Primary Care Provider Office Phone # Fax #    Seymour Alcantar [phone]       [facility] [address] [facility] [address]        Thank you!     Thank you for choosing [facility]  for your care. Our goal is always to provide you with excellent care. Hearing back from our patients is one way we can continue to improve our services. Please take a few minutes to complete the written survey that you may receive in the mail after your visit with us. Thank you!             Your Updated Medication List - Protect others around you: Learn how to safely use, store and throw away your medicines at www.disposemymeds.org.          This list is accurate as of: 3/29/17 11:44 AM.  Always use your most recent med list.                   " Brand Name Dispense Instructions for use    calcium carbonate 500 MG tablet    OS-TANIA 500 mg [place]. [place]     Take 600 mg by mouth daily       OMEGA-3 FISH OIL PO      Reported on 3/29/2017       prenatal multivitamin  plus iron 27-0.8 MG Tabs per tablet      Take 1 tablet by mouth daily

## 2017-03-30 ENCOUNTER — TELEPHONE (OUTPATIENT)
Dept: OBGYN | Facility: OTHER | Age: 37
End: 2017-03-30

## 2017-03-30 DIAGNOSIS — O02.1 MISSED ABORTION: Primary | ICD-10-CM

## 2017-03-30 DIAGNOSIS — R10.2 PELVIC CRAMPING: ICD-10-CM

## 2017-03-30 NOTE — TELEPHONE ENCOUNTER
Patient of Dr. Jacobs was seen yesterday in clinic with missed . She thinks she is now just starting to miscarry. Starting to have a little bit of bleeding and cramping that is intense. Pain is a 4-5 out of 10 right now. Has tried Tylenol without relief. She will get some Ibuprofen and try that. The pain is more intense than she expected, but still tolerable. She is afraid that it will get a lot worse since she is just starting to miscarry and is wondering if she can get an RX for something stronger than Ibuprofen in case she needs it as she does not want to go to ER later for pain medication.     If she can have RX, uses [facility]    [phone]

## 2017-03-30 NOTE — TELEPHONE ENCOUNTER
Discussed with Dr. Paez. Written RX for oxycodone 5 mg #24 1-2 q 4 hr prn pain to  for patient to .    Dr. Paez, please sign in meds and orders of this encounter so a record of this script goes into her EPIC chart. Thanks.

## 2017-03-31 RX ORDER — OXYCODONE HYDROCHLORIDE 5 MG/1
TABLET ORAL
Qty: 24 TABLET | Refills: 0
Start: 2017-03-31 | End: 2017-04-11

## 2017-04-05 ENCOUNTER — OFFICE VISIT (OUTPATIENT)
Dept: OBGYN | Facility: OTHER | Age: 37
End: 2017-04-05
Attending: OBSTETRICS & GYNECOLOGY
Payer: COMMERCIAL

## 2017-04-05 VITALS
SYSTOLIC BLOOD PRESSURE: 90 MMHG | WEIGHT: 160 LBS | HEIGHT: 65 IN | BODY MASS INDEX: 26.66 KG/M2 | DIASTOLIC BLOOD PRESSURE: 62 MMHG | HEART RATE: 76 BPM

## 2017-04-05 DIAGNOSIS — O02.1 MISSED AB: Primary | ICD-10-CM

## 2017-04-05 DIAGNOSIS — O02.1 MISSED ABORTION: ICD-10-CM

## 2017-04-05 DIAGNOSIS — O03.4 INCOMPLETE ABORTION: ICD-10-CM

## 2017-04-05 PROCEDURE — 76817 TRANSVAGINAL US OBSTETRIC: CPT | Performed by: OBSTETRICS & GYNECOLOGY

## 2017-04-05 PROCEDURE — 99214 OFFICE O/P EST MOD 30 MIN: CPT | Mod: 25 | Performed by: OBSTETRICS & GYNECOLOGY

## 2017-04-05 NOTE — PROGRESS NOTES
"Joaquina Perez is a 37 year old female who thinks she miscarried Saturday. Still bleeding. Some cramping. Not much at all today      O:   BP 90/62  Pulse 76  Ht 5' 4.5\" (1.638 m)  Wt 160 lb (72.6 kg)  BMI 27.04 kg/m2   Slight POC's in os. cx open    A:  Incomplete     P:  Suction  discussed  POC's removed  tvusg show retained poc's  Stat Quant and HP prior to visit to visit  400 mcg cytotec SL q 4 x 4  rto Monday.  ED parameters discussed  Fertility and condoms discussed      Greater than 25 minutes were spent face to face counseling this patient    Geetha Jacobs MD    "

## 2017-04-05 NOTE — MR AVS SNAPSHOT
After Visit Summary   2017    Joaquina Perez    MRN: [id number]           Patient Information     Date Of Birth          1980        Visit Information        Provider Department      2017 1:30 PM Geetha Jacobs MD [facility]        Today's Diagnoses     Missed ab    -  1    Incomplete            Follow-ups after your visit        Your next 10 appointments already scheduled     2017  3:30 PM CDT   (Arrive by 3:15 PM)   New Prenatal with Geetha Jacobs MD   [facility] [facility]    [address] [facility] [address]   [phone]            2018 10:00 AM CST   (Arrive by 9:45 AM)   PHYSICAL with Geetha Jacobs MD   [facility] ([facility])    [address]  [facility] [address]   [phone]              Future tests that were ordered for you today     Open Future Orders        Priority Expected Expires Ordered    HCG, quantitative, pregnancy STAT  2018    CBC with platelets STAT  2018            Who to contact     If you have questions or need follow up information about today's clinic visit or your schedule please contact [facility] directly at [phone].  Normal or non-critical lab and imaging results will be communicated to you by MyChart, letter or phone within 4 business days after the clinic has received the results. If you do not hear from us within 7 days, please contact the clinic through [employer] or phone. If you have a critical or abnormal lab result, we will notify you by phone as soon as possible.  Submit refill requests through [employer] or call your pharmacy and they will forward the refill request to us. Please allow 3 business days for your refill to be completed.          Additional Information About Your Visit        [employer] Information     [employer] gives you secure access to your electronic health record. If you see a primary care  "provider, you can also send messages to your care team and make appointments. If you have questions, please call your primary care clinic.  If you do not have a primary care provider, please call [phone] and they will assist you.        Care EveryWhere ID     This is your Care EveryWhere ID. This could be used by other organizations to access your [place] medical records  [id number]        Your Vitals Were     Pulse Height BMI (Body Mass Index)             76 5' 4.5\" (1.638 m) 27.04 kg/m2          Blood Pressure from Last 3 Encounters:   04/05/17 90/62   03/29/17 108/72   03/22/17 100/62    Weight from Last 3 Encounters:   04/05/17 160 lb (72.6 kg)   03/29/17 162 lb (73.5 kg)   03/22/17 161 lb (73 kg)              We Performed the Following     US OB (Clinic Only)        Primary Care Provider Office Phone # Fax #    Seymour Alcantar [phone]       [facility] [address] [facility] [address]        Thank you!     Thank you for choosing [facility]  for your care. Our goal is always to provide you with excellent care. Hearing back from our patients is one way we can continue to improve our services. Please take a few minutes to complete the written survey that you may receive in the mail after your visit with us. Thank you!             Your Updated Medication List - Protect others around you: Learn how to safely use, store and throw away your medicines at www.disposemymeds.org.          This list is accurate as of: 4/5/17  2:31 PM.  Always use your most recent med list.                   Brand Name Dispense Instructions for use    calcium carbonate 500 MG tablet    OS-TANIA 500 mg [place]. Ca     Take 600 mg by mouth daily       OMEGA-3 FISH OIL PO      Reported on 3/29/2017       oxyCODONE 5 MG IR tablet    ROXICODONE    24 tablet    1-2 po q 4 hours prn pain       prenatal multivitamin  plus iron 27-0.8 MG Tabs per tablet      Take 1 tablet by mouth daily       " VITAMIN D (CHOLECALCIFEROL) PO      Take 1,000 Units by mouth daily

## 2017-04-06 NOTE — PATIENT INSTRUCTIONS
Cytotec 200 mcg tablets given. Dissolve 2 under tongue every 4 hours x 4 doses.  Return on Monday. Stop at lab first.

## 2017-04-10 DIAGNOSIS — O02.1 MISSED AB: ICD-10-CM

## 2017-04-10 DIAGNOSIS — O03.4 INCOMPLETE ABORTION: ICD-10-CM

## 2017-04-10 LAB
B-HCG SERPL-ACNC: 116 IU/L (ref 0–5)
ERYTHROCYTE [DISTWIDTH] IN BLOOD BY AUTOMATED COUNT: 11.7 % (ref 10–15)
HCT VFR BLD AUTO: 35.5 % (ref 35–47)
HGB BLD-MCNC: 12.3 G/DL (ref 11.7–15.7)
MCH RBC QN AUTO: 31.8 PG (ref 26.5–33)
MCHC RBC AUTO-ENTMCNC: 34.6 G/DL (ref 31.5–36.5)
MCV RBC AUTO: 92 FL (ref 78–100)
PLATELET # BLD AUTO: 249 10E9/L (ref 150–450)
RBC # BLD AUTO: 3.87 10E12/L (ref 3.8–5.2)
WBC # BLD AUTO: 6.4 10E9/L (ref 4–11)

## 2017-04-10 PROCEDURE — 85027 COMPLETE CBC AUTOMATED: CPT | Performed by: OBSTETRICS & GYNECOLOGY

## 2017-04-10 PROCEDURE — 36415 COLL VENOUS BLD VENIPUNCTURE: CPT | Performed by: OBSTETRICS & GYNECOLOGY

## 2017-04-10 PROCEDURE — 84702 CHORIONIC GONADOTROPIN TEST: CPT | Performed by: OBSTETRICS & GYNECOLOGY

## 2017-04-10 RX ORDER — MISOPROSTOL 200 UG/1
400 TABLET ORAL EVERY 4 HOURS PRN
Qty: 8 TABLET | Refills: 0
Start: 2017-04-10 | End: 2017-04-11

## 2017-04-11 ENCOUNTER — TELEPHONE (OUTPATIENT)
Dept: FAMILY MEDICINE | Facility: OTHER | Age: 37
End: 2017-04-11

## 2017-04-11 ENCOUNTER — OFFICE VISIT (OUTPATIENT)
Dept: OBGYN | Facility: OTHER | Age: 37
End: 2017-04-11
Attending: OBSTETRICS & GYNECOLOGY
Payer: COMMERCIAL

## 2017-04-11 VITALS
SYSTOLIC BLOOD PRESSURE: 90 MMHG | HEIGHT: 65 IN | HEART RATE: 68 BPM | WEIGHT: 160 LBS | BODY MASS INDEX: 26.66 KG/M2 | DIASTOLIC BLOOD PRESSURE: 56 MMHG

## 2017-04-11 DIAGNOSIS — O03.4 INCOMPLETE ABORTION: ICD-10-CM

## 2017-04-11 DIAGNOSIS — M79.604 BILATERAL LEG PAIN: Primary | ICD-10-CM

## 2017-04-11 DIAGNOSIS — M79.605 BILATERAL LEG PAIN: Primary | ICD-10-CM

## 2017-04-11 DIAGNOSIS — O02.1 MISSED ABORTION: Primary | ICD-10-CM

## 2017-04-11 PROCEDURE — 99214 OFFICE O/P EST MOD 30 MIN: CPT | Mod: 25 | Performed by: OBSTETRICS & GYNECOLOGY

## 2017-04-11 PROCEDURE — 76817 TRANSVAGINAL US OBSTETRIC: CPT | Performed by: OBSTETRICS & GYNECOLOGY

## 2017-04-11 NOTE — PROGRESS NOTES
"Joaquina Perez is a 37 year old female here after second round of cytotec. Minimal bleeding.      O:   BP 90/56  Pulse 68  Ht 5' 4.5\" (1.638 m)  Wt 160 lb (72.6 kg)  BMI 27.04 kg/m2   aa  TV usg shows less in uterus    A:  Incomplete     P:  hcg weekly  Options discussed  rto preg  Cont folate  If plateaus then cytotec  No unprotected sex until after first period    Greater than 25 minutes were spent face to face counseling this patient    Geetha Jacobs MD    "

## 2017-04-11 NOTE — MR AVS SNAPSHOT
After Visit Summary   2017    Joaquina Perez    MRN: [id number]           Patient Information     Date Of Birth          1980        Visit Information        Provider Department      2017 8:20 AM Geetha Jacobs MD [facility]        Today's Diagnoses     Missed     -  1    Incomplete           Care Instructions    HCG weekly in lab  Continue folate.  Return with positive pregnancy test or as needed.         Follow-ups after your visit        Your next 10 appointments already scheduled     2018 10:00 AM CST   (Arrive by 9:45 AM)   PHYSICAL with Geetha Jacobs MD   [facility] ([facility] [place] [facility])    [address]  [place] [address]   [phone]              Future tests that were ordered for you today     Open Standing Orders        Priority Remaining Interval Expires Ordered    HCG quantitative pregnancy Routine 10/10 weekly 2018            Who to contact     If you have questions or need follow up information about today's clinic visit or your schedule please contact [facility] directly at [phone].  Normal or non-critical lab and imaging results will be communicated to you by MyChart, letter or phone within 4 business days after the clinic has received the results. If you do not hear from us within 7 days, please contact the clinic through MyChart or phone. If you have a critical or abnormal lab result, we will notify you by phone as soon as possible.  Submit refill requests through [employer] or call your pharmacy and they will forward the refill request to us. Please allow 3 business days for your refill to be completed.          Additional Information About Your Visit        MyChart Information     [employer] gives you secure access to your electronic health record. If you see a primary care provider, you can also send messages to your care team and make appointments. If you have  "questions, please call your primary care clinic.  If you do not have a primary care provider, please call [phone] and they will assist you.        Care EveryWhere ID     This is your Care EveryWhere ID. This could be used by other organizations to access your [place] medical records  [id number]        Your Vitals Were     Pulse Height BMI (Body Mass Index)             68 5' 4.5\" (1.638 m) 27.04 kg/m2          Blood Pressure from Last 3 Encounters:   04/11/17 90/56   04/05/17 90/62   03/29/17 108/72    Weight from Last 3 Encounters:   04/11/17 160 lb (72.6 kg)   04/05/17 160 lb (72.6 kg)   03/29/17 162 lb (73.5 kg)              We Performed the Following     US OB (Clinic Only)        Primary Care Provider Office Phone # Fax #    Seymour Alcantar [phone]       [address]  [facility] [address]        Thank you!     Thank you for choosing [facility]  for your care. Our goal is always to provide you with excellent care. Hearing back from our patients is one way we can continue to improve our services. Please take a few minutes to complete the written survey that you may receive in the mail after your visit with us. Thank you!             Your Updated Medication List - Protect others around you: Learn how to safely use, store and throw away your medicines at www.disposemymeds.org.          This list is accurate as of: 4/11/17  8:54 AM.  Always use your most recent med list.                   Brand Name Dispense Instructions for use    calcium carbonate 500 MG tablet    OS-TANIA 500 mg [place]. Ca     Take 600 mg by mouth daily       OMEGA-3 FISH OIL PO      Reported on 3/29/2017       prenatal multivitamin  plus iron 27-0.8 MG Tabs per tablet      Take 1 tablet by mouth daily       VITAMIN D (CHOLECALCIFEROL) PO      Take 1,000 Units by mouth daily         "

## 2017-04-11 NOTE — TELEPHONE ENCOUNTER
Reason for call:  REFERRAL   1. Concern: PT for leg  2. Have you seen a provider for this concern? Yes  3. Who? Dr Alcantar  4. When?   5. What services are you requesting? Referral for PT  Description: Patient states that she has seen Dr Alcantar in the past for leg pain and he had offered a referral for PT, but, she did not want it at that time. She would like to know if he would still do that for her or if she needs to be seen again? If you could call back at [phone]  Was an appointment offered for this a call? Not at this time  Preferred method for responding to this message: Telephone Call  If we cannot reach you directly, may we leave a detailed response at the number you provided? Yes  Can this message wait until your PCP/Provider returns if not available today? YES

## 2017-04-13 ENCOUNTER — HOSPITAL ENCOUNTER (OUTPATIENT)
Dept: PHYSICAL THERAPY | Facility: HOSPITAL | Age: 37
Setting detail: THERAPIES SERIES
End: 2017-04-13
Attending: FAMILY MEDICINE
Payer: COMMERCIAL

## 2017-04-13 PROCEDURE — 97110 THERAPEUTIC EXERCISES: CPT | Mod: GP

## 2017-04-13 PROCEDURE — 97140 MANUAL THERAPY 1/> REGIONS: CPT | Mod: GP

## 2017-04-13 PROCEDURE — 40000718 ZZHC STATISTIC PT DEPARTMENT ORTHO VISIT

## 2017-04-13 PROCEDURE — 97161 PT EVAL LOW COMPLEX 20 MIN: CPT | Mod: GP

## 2017-04-13 NOTE — PROGRESS NOTES
04/13/17 0807   General Information   Type of Visit Initial OP Ortho PT Evaluation   Start of Care Date 04/13/17   Referring Physician Dr. Alcanatr   Patient/Family Goals Statement to rid herself of pain   Orders Evaluate and Treat   Date of Order 04/13/17   Insurance Type MA   Medical Diagnosis bilateral leg pain   Surgical/Medical history reviewed Yes   Presentation and Etiology   Pertinent history of current problem (include personal factors and/or comorbidities that impact the POC) has hadpain going down legs with MRI suggesting degeneration, just had miscarraige. SHe also is in nursing school and sitting a lot. Pain is more relieved with standing.   Impairments A. Pain;F. Decreased strength and endurance   Functional Limitations perform activities of daily living;perform required work activities;perform desired leisure / sports activities   Symptom Location down bilateral legs   How/Where did it occur From Degenerative Joint Disease;From insidious onset;With repetition/overuse   Onset date of current episode/exacerbation 11/01/16   Chronicity New   Pain rating (0-10 point scale) (severe down legs)   Pain quality A. Sharp;E. Shooting   Frequency of pain/symptoms A. Constant   Pain/symptoms are: Worse during the day   Pain/symptoms exacerbated by A. Sitting   Pain/symptoms eased by E. Changing positions   Progression of symptoms since onset: Worsened   Prior Level of Function   Prior Level of Function-Mobility prior no pain   Prior Level of Function-ADLs prior no pain   Current Level of Function   Patient role/employment history B. Student;A. Employed   Living environment [facility]   Lumbar Spine/SI Objective Findings   Observation no issues with gait   Integumentary no issues   Posture rounded shoulders and forward head   Gait/Locomotion no limp   Flexion ROM full   Extension ROM functional   Right Side Bending ROM to knee   Left Side Bending ROM to knee   Lumbar ROM Comment Right on left, L5 left L4 right  FRS   Pelvic Screen + stork   Transversus Abdominus Strength (Bryce Leg Lowering-deg) +1/5   Hip Flexion (L2) Strength 4/5   Hip Abduction Strength left 5/5, right 4/5   Knee Extension (L3) Strength functional   Ankle Plantar Flexion (S1) Strength functional   Hamstring Flexibility 80 degrees   Piriformis Flexibility just past neutral   Lumbar/SI Flexibility Comments quite flexible   SLR neg each side   Slump Test neg   Segmental Mobility see above   Patellar Tendon Reflexes  +2/4   Planned Therapy Interventions   Planned Therapy Interventions neuromuscular re-education;manual therapy;joint mobilization;strengthening;stretching   Planned Therapy Interventions Comment Back in balance   Planned Modality Interventions   Planned Modality Interventions Comments modalities PRN   Clinical Impression   Criteria for Skilled Therapeutic Interventions Met yes, treatment indicated   PT Diagnosis weakness with somatic dysfunction   Influenced by the following impairments loose ligs, question coxalgia issues   Functional limitations due to impairments can't sit, has been chiropractor x several months   Clinical Presentation Stable/Uncomplicated   Clinical Presentation Rationale clinical obs   Clinical Decision Making (Complexity) Low complexity   Therapy Frequency 2 times/Week   Predicted Duration of Therapy Intervention (days/wks) 12 weeks   Risk & Benefits of therapy have been explained Yes   Patient, Family & other staff in agreement with plan of care Yes   Clinical Impression Comments Presents with weakness core and somatic dysfunction low back adn SI. I question possible coxalgia. I want her to see Vanessa for this.   Education Assessment   Preferred Learning Style Listening;Reading;Demonstration;Pictures/video   Barriers to Learning No barriers   Ortho Goal 1   Goal Identifier functional   Goal Description Client will be independent with postural correction and HEP.   Target Date 06/02/17   Ortho Goal 2   Goal Identifier  functional    Goal Description Client will be able to sit with no pain down legs or discomfort with transitions.   Target Date 07/07/17   Ortho Goal 3   Goal Identifier functional   Goal Description Client will have increase in strength transversus and hip abd by one grade for better function and return to leiMama activites with decreased pain 1/10 oe less.   Target Date 07/07/17   Total Evaluation Time   Total Evaluation Time 20   Therapy Certification   Certification date from 04/13/17   Certification date to 07/07/17

## 2017-04-17 DIAGNOSIS — O03.4 INCOMPLETE ABORTION: ICD-10-CM

## 2017-04-17 DIAGNOSIS — O02.1 MISSED ABORTION: ICD-10-CM

## 2017-04-17 LAB — B-HCG SERPL-ACNC: 18 IU/L (ref 0–5)

## 2017-04-17 PROCEDURE — 36415 COLL VENOUS BLD VENIPUNCTURE: CPT | Performed by: OBSTETRICS & GYNECOLOGY

## 2017-04-17 PROCEDURE — 84702 CHORIONIC GONADOTROPIN TEST: CPT | Performed by: OBSTETRICS & GYNECOLOGY

## 2017-04-19 ENCOUNTER — HOSPITAL ENCOUNTER (OUTPATIENT)
Dept: PHYSICAL THERAPY | Facility: HOSPITAL | Age: 37
Setting detail: THERAPIES SERIES
End: 2017-04-19
Attending: FAMILY MEDICINE
Payer: COMMERCIAL

## 2017-04-19 PROCEDURE — 40000718 ZZHC STATISTIC PT DEPARTMENT ORTHO VISIT

## 2017-04-19 PROCEDURE — 97110 THERAPEUTIC EXERCISES: CPT | Mod: GP

## 2017-04-21 ENCOUNTER — TELEPHONE (OUTPATIENT)
Dept: FAMILY MEDICINE | Facility: OTHER | Age: 37
End: 2017-04-21

## 2017-04-21 DIAGNOSIS — M79.605 BILATERAL LEG PAIN: Primary | ICD-10-CM

## 2017-04-21 DIAGNOSIS — M79.604 BILATERAL LEG PAIN: Primary | ICD-10-CM

## 2017-04-21 DIAGNOSIS — M99.05 SOMATIC DYSFUNCTION OF PELVIC REGION: ICD-10-CM

## 2017-04-21 NOTE — TELEPHONE ENCOUNTER
Requesting specialized PT orders for pelvic eval and treat. Pt agrees with this as she has already started back physical therapy

## 2017-04-24 DIAGNOSIS — O03.4 INCOMPLETE ABORTION: ICD-10-CM

## 2017-04-24 DIAGNOSIS — O02.1 MISSED ABORTION: ICD-10-CM

## 2017-04-24 LAB — B-HCG SERPL-ACNC: 3 IU/L (ref 0–5)

## 2017-04-24 PROCEDURE — 36415 COLL VENOUS BLD VENIPUNCTURE: CPT | Performed by: OBSTETRICS & GYNECOLOGY

## 2017-04-24 PROCEDURE — 84702 CHORIONIC GONADOTROPIN TEST: CPT | Performed by: OBSTETRICS & GYNECOLOGY

## 2017-04-26 ENCOUNTER — HOSPITAL ENCOUNTER (OUTPATIENT)
Dept: PHYSICAL THERAPY | Facility: HOSPITAL | Age: 37
Setting detail: THERAPIES SERIES
End: 2017-04-26
Attending: FAMILY MEDICINE
Payer: COMMERCIAL

## 2017-04-26 PROCEDURE — 97110 THERAPEUTIC EXERCISES: CPT | Mod: GP

## 2017-04-26 PROCEDURE — 40000718 ZZHC STATISTIC PT DEPARTMENT ORTHO VISIT

## 2017-04-26 PROCEDURE — 97164 PT RE-EVAL EST PLAN CARE: CPT | Mod: GP,59

## 2017-04-27 NOTE — PROGRESS NOTES
04/26/17 0900   General Information   Type of Visit OP Ortho PT Re-evaluation   Start of Care Date 04/26/17   Referring Physician Dr Alcantar   Patient/Family Goals Statement decr pain   Orders Evaluate and Treat   Date of Order 04/21/17   Insurance Type MA   Medical Diagnosis Pelvic floor dysfunction through leg and coccygeal pain, incontinence   Surgical/Medical history reviewed Yes   Precautions/Limitations other (see comments)  (recent miscarriage)   General Information Comments Pt has a 7 year old with difficult labor and delivery with Csection.    Body Part(s)   Body Part(s) Pelvic Floor Dysfunction   Presentation and Etiology   Pertinent history of current problem (include personal factors and/or comorbidities that impact the POC) This 36 y/o female is having remaining leg/back pain.    Impairments A. Pain;F. Decreased strength and endurance;E. Decreased flexibility   Functional Limitations perform activities of daily living;perform required work activities;perform desired leisure / sports activities   Symptom Location BLEs, coccygeal/buttock pain   How/Where did it occur From insidious onset;With repetition/overuse  (in [employer] school sitting)   Chronicity New   Pain quality A. Sharp;B. Dull;C. Aching;E. Shooting   Frequency of pain/symptoms B. Intermittent   Pain/symptoms are: Worse during the day   Pain/symptoms exacerbated by A. Sitting;F. Nothing;G. Certain positions   Pain/symptoms eased by D. Nothing;E. Changing positions   Progression of symptoms since onset: Worsened   Prior Level of Function   Prior Level of Function-Mobility prior no pain   Prior Level of Function-ADLs prior no pain   Current Level of Function   Patient role/employment history B. Student;A. Employed   Employment Comments part time restaurant/bar   Specific Questions   Specific Questions Pelvic Floor Dysfunction   Pelvic Floor Dysfunction Questions   Regular exercise Yes   Fluid intake-glasses/day (one glass/cup = 8oz 56   Caffeinated  "beverages-glasses/day 0   Alcoholic beverages - glasses/day 1   (wine)   Recent diet change? No   How long can you delay the need to urinate?  20 mn   How many times do you wake to urinate at night?   1   How often do you urinate during the day?   10   Can you stop the flow of urine when on the toilet?  Yes   Is the volume of urine passed usually  Medium   Do you have the sensation that you need to go to the toilet?  Yes   Do you empty your bladder frequently, before you experience the urge to pass urine?  Yes  (at nighttime)   Do you have \"triggers\" that make you feel you can't wait to go to the toilet?  Yes   Number of bladder infections last year?  1   Frequency of bowel movements:  1 every day to 1x every other    Consistency of stool?  Soft formed   Do you ignore the urge to defecate?  No   Pelvic Floor Dysfunction Objective Findings   Pain-pelvic dysfunction Pelvic pain   Areas of Tightness Piriformis;Iliopsoas;Adductors   Type of Storage Problem nocturia;urgency   Type of Emptying Problem strain to void;hesitancy   Protection needed None   Power (MMT at [employer]) (Pelvic floor mm exam deferred d/t menustration)   Medications (vitamins)   Planned Therapy Interventions   Planned Therapy Interventions manual therapy;neuromuscular re-education;strengthening;stretching   Planned Therapy Interventions Comment HEP   Planned Modality Interventions   Planned Modality Interventions Biofeedback   Planned Modality Interventions Comments prn   Clinical Impression   Criteria for Skilled Therapeutic Interventions Met yes, treatment indicated   PT Diagnosis Pelvic floor dysfunction , incontinence   Influenced by the following impairments pain, tightness, weakness   Functional limitations due to impairments difficulty sitting in class, sleeping, running   Clinical Presentation Stable/Uncomplicated   Clinical Presentation Rationale clinical obs   Clinical Decision Making (Complexity) Low complexity   Therapy Frequency 1 " time/week   Predicted Duration of Therapy Intervention (days/wks) 10 weeks   Risk & Benefits of therapy have been explained Yes   Patient, Family & other staff in agreement with plan of care Yes   Clinical Impression Comments Pt will be further assessed for PFM next visit per her consent. Pelvic floor tightness appears to be driving pain in butt, coccyx with incontinence and painful sex as most positive signs   Education Assessment   Barriers to Learning No barriers   Ortho Goal 4   Goal Identifier STG 1   Goal Description Pt will have no incontinence episodes with sneezing or when bladder is full   Target Date 06/01/17   Ortho Goal 5   Goal Identifier STG  2   Goal Description Pt will have reduced tailbone pain to 4/10 or less with sitting x 2 hours   Target Date 06/08/17   Ortho Goal 6   Goal Identifier LTG    Goal Description Pt will indep manage incontinence and pain through HEP and manual techniques as indep demonstrated   Target Date 07/06/17   Total Evaluation Time   Total Evaluation Time 25   Therapy Certification   Certification date from 04/26/17   Certification date to 07/20/17

## 2017-05-09 ENCOUNTER — HOSPITAL ENCOUNTER (OUTPATIENT)
Dept: PHYSICAL THERAPY | Facility: HOSPITAL | Age: 37
Setting detail: THERAPIES SERIES
End: 2017-05-09
Attending: FAMILY MEDICINE
Payer: COMMERCIAL

## 2017-05-09 PROCEDURE — 40000718 ZZHC STATISTIC PT DEPARTMENT ORTHO VISIT

## 2017-05-09 PROCEDURE — 97140 MANUAL THERAPY 1/> REGIONS: CPT | Mod: GP

## 2017-05-09 PROCEDURE — 97530 THERAPEUTIC ACTIVITIES: CPT | Mod: GP

## 2017-05-09 PROCEDURE — 97110 THERAPEUTIC EXERCISES: CPT | Mod: GP

## 2017-05-22 ENCOUNTER — HOSPITAL ENCOUNTER (OUTPATIENT)
Dept: PHYSICAL THERAPY | Facility: HOSPITAL | Age: 37
Setting detail: THERAPIES SERIES
End: 2017-05-22
Attending: FAMILY MEDICINE
Payer: COMMERCIAL

## 2017-05-22 PROCEDURE — 97140 MANUAL THERAPY 1/> REGIONS: CPT | Mod: GP

## 2017-05-22 PROCEDURE — 97110 THERAPEUTIC EXERCISES: CPT | Mod: GP

## 2017-05-22 PROCEDURE — 40000718 ZZHC STATISTIC PT DEPARTMENT ORTHO VISIT

## 2017-05-30 ENCOUNTER — HOSPITAL ENCOUNTER (OUTPATIENT)
Dept: PHYSICAL THERAPY | Facility: HOSPITAL | Age: 37
Setting detail: THERAPIES SERIES
End: 2017-05-30
Attending: FAMILY MEDICINE
Payer: COMMERCIAL

## 2017-05-30 PROCEDURE — 40000718 ZZHC STATISTIC PT DEPARTMENT ORTHO VISIT

## 2017-05-30 PROCEDURE — 97110 THERAPEUTIC EXERCISES: CPT | Mod: GP

## 2017-05-30 PROCEDURE — 97140 MANUAL THERAPY 1/> REGIONS: CPT | Mod: GP

## 2017-06-04 DIAGNOSIS — Z30.09 FAMILY PLANNING: ICD-10-CM

## 2017-06-04 DIAGNOSIS — F41.8 DEPRESSION WITH ANXIETY: ICD-10-CM

## 2017-06-06 RX ORDER — HYDROXYZINE HYDROCHLORIDE 25 MG/1
TABLET, FILM COATED ORAL
Qty: 30 TABLET | Refills: 3 | Status: SHIPPED | OUTPATIENT
Start: 2017-06-06 | End: 2017-07-31

## 2017-06-06 NOTE — TELEPHONE ENCOUNTER
Refill request received for Vistaril for anxiety. Has been on this since 2014. Last seen in April. Trying for a pregnancy.    Please sign or decline RX in this encounter.

## 2017-06-20 ENCOUNTER — APPOINTMENT (OUTPATIENT)
Dept: OCCUPATIONAL MEDICINE | Facility: OTHER | Age: 37
End: 2017-06-20

## 2017-07-06 ENCOUNTER — HOSPITAL ENCOUNTER (OUTPATIENT)
Dept: PHYSICAL THERAPY | Facility: HOSPITAL | Age: 37
Setting detail: THERAPIES SERIES
End: 2017-07-06
Attending: FAMILY MEDICINE
Payer: COMMERCIAL

## 2017-07-06 PROCEDURE — 97140 MANUAL THERAPY 1/> REGIONS: CPT | Mod: GP

## 2017-07-06 PROCEDURE — 97530 THERAPEUTIC ACTIVITIES: CPT | Mod: GP

## 2017-07-06 PROCEDURE — 40000718 ZZHC STATISTIC PT DEPARTMENT ORTHO VISIT

## 2017-07-06 NOTE — PROGRESS NOTES
07/06/17 0932   Signing Clinician's Name / Credentials   Signing clinician's name / credentials Rody Yuan DPT   Session Number   Session Number 7   Subjective Report   Subjective Report Patient seen [id number] for C/O leg and pelvic floor pain. In association with Davida Chen, PT. It feels like she has bone pain in her butt. 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 It really hurts in her buttocks and tailbone when she sits. Nothing seems to help. She has had chiropractic, massage and PT. She is very frustrated. She is very practive with her exercise. It was suggested she not run, and she has not, but she has been walking and using her BowFlex. Pain rated 6/10 when she is sitting, and 1/10 when she is standing. Not much has shown up on MRI except for some minor degenerative changes at L5-S1. She also was pregnant and had a miscarriage early this year.    Objective Measures   Objective Measures Objective Measure 1   Objective Measure 1   Objective Measure Somatic dysfunction   Details Bladder-uterus hypomobile in expir, tilted anteriorly, descended. Left SI jt locked and left LE 1/2 inch shorter than right. Left ASIS, IC, PSIS and IT all superior relative to right. Right sacral sulcus deep and right MO prominent. Coccyx WNL. T2-L5=NSLRR.   Therapeutic Activity   Minutes 15'   Skilled Intervention ther act   Patient Response good   Treatment Detail Discussed the importance of standing and sitting with equal weight on right and left sides to avoid re-establishing the dysfunctional mechanical pattern found today   Progress x   Manual Therapy   Minutes 30'   Skilled Intervention man ther   Patient Response good, asked appropriate questions   Treatment Detail Visceral mobilization, MET, regional indirect tissue releases   Progress Post tx bladder-uterus mobility WNL, leg lengths equal, pelvis level and SI jts equally mobile. Sacral, lumbar and thoracic segments WFL of mobility and  alignment.   Plan   Homework Stand and sit with equal weight on right and left sides   Updates to plan of care manual therapy 1-2 X week   Plan for next session Tx per re-eval   Comments   Comments Findings consistent with bladder-uterus dysfunction, left upslipped innominate with functionally short left LE, SR sacrum, and SLRR curve in T-L spine that was associated with the leg length difference   Total Session Time   Total Session Time 45'     DARYA ZavalaT

## 2017-07-11 ENCOUNTER — TELEPHONE (OUTPATIENT)
Dept: OBGYN | Facility: OTHER | Age: 37
End: 2017-07-11

## 2017-07-11 NOTE — TELEPHONE ENCOUNTER
Positive pregnancy test : yes x 4  LMP : 6/12/17  GA: 4 2/7  Prenatal vitamins?: Yes, OTC PNV. Will check folate to be sure 800 mcg or more.  Bleeding?: no  Cramping?: no  1-sided pelvic pain?: no  Advised patient to be seen ASAP if any of the above symptoms.  [facility] appt scheduled with Dr. Jacobs on 8/1 @ 8:40. I did offer patient appt sooner, but she declined.

## 2017-07-17 ENCOUNTER — OFFICE VISIT (OUTPATIENT)
Dept: FAMILY MEDICINE | Facility: OTHER | Age: 37
End: 2017-07-17
Attending: FAMILY MEDICINE
Payer: COMMERCIAL

## 2017-07-17 VITALS
WEIGHT: 157 LBS | SYSTOLIC BLOOD PRESSURE: 106 MMHG | HEIGHT: 65 IN | DIASTOLIC BLOOD PRESSURE: 68 MMHG | HEART RATE: 64 BPM | BODY MASS INDEX: 26.16 KG/M2

## 2017-07-17 DIAGNOSIS — M79.605 BILATERAL LEG PAIN: Primary | ICD-10-CM

## 2017-07-17 DIAGNOSIS — M79.604 BILATERAL LEG PAIN: Primary | ICD-10-CM

## 2017-07-17 PROCEDURE — 99212 OFFICE O/P EST SF 10 MIN: CPT

## 2017-07-17 PROCEDURE — 99214 OFFICE O/P EST MOD 30 MIN: CPT | Performed by: FAMILY MEDICINE

## 2017-07-17 ASSESSMENT — PAIN SCALES - GENERAL: PAINLEVEL: MILD PAIN (3)

## 2017-07-17 NOTE — MR AVS SNAPSHOT
After Visit Summary   7/17/2017    Joaquina Perez    MRN: [id number]           Patient Information     Date Of Birth          1980        Visit Information        Provider Department      7/17/2017 4:00 PM Seymour Alcantar MD [facility]        Today's Diagnoses     Bilateral leg pain    -  1       Follow-ups after your visit        Additional Services     NEUROLOGY ADULT REFERRAL       Your provider has referred you for the following:   Consult and BLE EMG     Please be aware that coverage of these services is subject to the terms and limitations of your health insurance plan.  Call member services at your health plan with any benefit or coverage questions.      Please bring the following with you to your appointment:    (1) Any X-Rays, CTs or MRIs which have been performed.  Contact the facility where they were done to arrange for  prior to your scheduled appointment.    (2) List of current medications  (3) This referral request   (4) Any documents/labs given to you for this referral            PHYSIATRY REFERRAL       Your provider has referred you to:[facility]   Please be aware that coverage of these services is subject to the terms and limitations of your health insurance plan.  Call member services at your health plan with any benefit or coverage questions.      Please bring the following to your appointment:  >>   Any x-rays, CTs or MRIs which have been performed.  Contact the facility where they were done to arrange for  prior to your scheduled appointment.    >>   List of current medications   >>   This referral request   >>   Any documents/labs given to you for this referral                  Your next 10 appointments already scheduled     Jul 18, 2017  8:30 AM CDT   Treatment with Rody Yuan PT   [facility] ([facility] )    [address] [place] [address]   [phone]            Jul 20, 2017  8:30 AM CDT   Treatment with  "Rody Yuan, PT   [facility] ([facility] )    [address] [place], [place] [address] [place] [address]   [phone]            Aug 01, 2017  8:40 AM CDT   (Arrive by 8:25 AM)   ESTABLISHED PRENATAL with Geetha Jacobs MD   [facility] ([facility] )    [address]  [facility] [address]   [phone]            Feb 07, 2018 10:00 AM CST   (Arrive by 9:45 AM)   PHYSICAL with Geetha Jacobs MD   [facility] ([facility] )    [address]  [facility] [address]   [phone]              Who to contact     If you have questions or need follow up information about today's clinic visit or your schedule please contact [facility] directly at [phone].  Normal or non-critical lab and imaging results will be communicated to you by MyChart, letter or phone within 4 business days after the clinic has received the results. If you do not hear from us within 7 days, please contact the clinic through Testifhart or phone. If you have a critical or abnormal lab result, we will notify you by phone as soon as possible.  Submit refill requests through Alea or call your pharmacy and they will forward the refill request to us. Please allow 3 business days for your refill to be completed.          Additional Information About Your Visit        MyChart Information     Alea gives you secure access to your electronic health record. If you see a primary care provider, you can also send messages to your care team and make appointments. If you have questions, please call your primary care clinic.  If you do not have a primary care provider, please call [phone] and they will assist you.        Care EveryWhere ID     This is your Care EveryWhere ID. This could be used by other organizations to access your [facility] medical records  [id number]        Your Vitals Were     Pulse Height BMI (Body Mass Index)             64 5' 4.5\" (1.638 m) " 26.53 kg/m2          Blood Pressure from Last 3 Encounters:   07/17/17 106/68   04/11/17 90/56   04/05/17 90/62    Weight from Last 3 Encounters:   07/17/17 157 lb (71.2 kg)   04/11/17 160 lb (72.6 kg)   04/05/17 160 lb (72.6 kg)              We Performed the Following     NEUROLOGY ADULT REFERRAL     PHYSIATRY REFERRAL        Primary Care Provider Office Phone # Fax #    Seymour Alcantar [phone]       [facility] [address]  [facility] [address]        Equal Access to Services     [facility]: Hadii aad ku hadasho Soomaali, waaxda luqadaha, qaybta kaalmada adeegyada, sherice gutierrez . So [facility] [phone].    ATENCIÓN: Si habla español, tiene a moody disposición servicios gratuitos de asistencia lingüística. [facility] [phone].    We comply with applicable federal civil rights laws and [place] laws. We do not discriminate on the basis of race, color, national origin, age, disability sex, sexual orientation or gender identity.            Thank you!     Thank you for choosing [facility]  for your care. Our goal is always to provide you with excellent care. Hearing back from our patients is one way we can continue to improve our services. Please take a few minutes to complete the written survey that you may receive in the mail after your visit with us. Thank you!             Your Updated Medication List - Protect others around you: Learn how to safely use, store and throw away your medicines at www.disposemymeds.org.          This list is accurate as of: 7/17/17  5:23 PM.  Always use your most recent med list.                   Brand Name Dispense Instructions for use Diagnosis    calcium carbonate 1250 MG tablet    OS-TANIA 500 mg [place]. Ca     Take 600 mg by mouth daily        hydrOXYzine 25 MG tablet    ATARAX    30 tablet    TAKE ONE TABLET BY MOUTH EVERY DAY AS NEEDED FOR ANXIETY    Depression with anxiety, Family planning       OMEGA-3  FISH OIL PO      Reported on 3/29/2017        prenatal multivitamin  plus iron 27-0.8 MG Tabs per tablet      Take 1 tablet by mouth daily    Family planning, Depression with anxiety       VITAMIN D (CHOLECALCIFEROL) PO      Take 1,000 Units by mouth daily

## 2017-07-17 NOTE — PROGRESS NOTES
"  SUBJECTIVE:                                                    Joaquina Perez is a 37 year old female who presents to clinic today for the following health issues:    Musculoskeletal problem/pain      Duration: November 2016    Description  Location: bilateral posterior thigh    Intensity:  moderate    Accompanying signs and symptoms: numbness and tingling    History  Previous similar problem: YES  Previous evaluation:  x-ray and MRI    Precipitating or alleviating factors:  Trauma or overuse: no   Aggravating factors include: sitting and laying down     Therapies tried and outcome: rest/inactivity, massage, exercises, chiropractor, and physical therapy     Last notes reviewed and has tried PT and chiropractor  Still problematic  Worse on sitting'  Very frustrated on this  Compliant with PT and chiropractor along with exercise and stretching.     Pt is NEWLY pregnant at 5 weeks   Anxiety about same    MRI and XR reviewed          Problem list and histories reviewed & adjusted, as indicated.  Additional history: as documented        ROS:  C: NEGATIVE for fever, chills, change in weight  R: NEGATIVE for significant cough or SOB  CV: NEGATIVE for chest pain, palpitations or peripheral edema    OBJECTIVE:                                                    /68  Pulse 64  Ht 5' 4.5\" (1.638 m)  Wt 157 lb (71.2 kg)  BMI 26.53 kg/m2  Body mass index is 26.53 kg/(m^2).   GENERAL: healthy, alert, well nourished, well hydrated, no distress  MS: extremities- LE  no gross deformities noted, no edema, good strength - points to deep medial buttock where she feels pain - can not reproduse so much with palpation but sitting is bad for her  BACK: no CVA tenderness, mild diffuse bilateral  paralumbar tenderness         ASSESSMENT/PLAN:                                                    1. Bilateral leg pain  Etiology unclear- MSK most likely - ??piriformis syndrome.  Lumbar radiculapathy doubt but still possible.  " Neuropathy doubt   - NEUROLOGY ADULT REFERRAL  - PHYSIATRY REFERRAL  After long discussion and limitations by new pregnancy.  Will ask for EMG and consult from Neuro and most likely with be not nerve in origin.  IF neuro w/u negative - move then to  PMR for help. In meantime ask PT to work on piriformis and good HEP for Piriformis muscle stretches.  Pt agrees.  No meds due to pregnancy.  Symptomatic treatment was discussed along when patient should call and/or come back into the clinic or go to ER/Urgent care. All questions answered.   25-30* minutes was spent with patient and over 50%  of this time was spent on counseling patient regarding  illness, medication and / or treatment  options, coordinating further cares and follow ups that are needed along with resource material that will be helpful in the treatment of these issues.       Seymour Alcantar MD  [facility]

## 2017-07-17 NOTE — NURSING NOTE
"Chief Complaint   Patient presents with     Musculoskeletal Problem       Initial /68  Pulse 64  Ht 5' 4.5\" (1.638 m)  Wt 157 lb (71.2 kg)  BMI 26.53 kg/m2 Estimated body mass index is 26.53 kg/(m^2) as calculated from the following:    Height as of this encounter: 5' 4.5\" (1.638 m).    Weight as of this encounter: 157 lb (71.2 kg).  Medication Reconciliation: complete   Mary Iraheta    "

## 2017-07-18 ENCOUNTER — HOSPITAL ENCOUNTER (OUTPATIENT)
Dept: PHYSICAL THERAPY | Facility: HOSPITAL | Age: 37
Setting detail: THERAPIES SERIES
End: 2017-07-18
Attending: FAMILY MEDICINE
Payer: COMMERCIAL

## 2017-07-18 PROCEDURE — 97140 MANUAL THERAPY 1/> REGIONS: CPT | Mod: GP

## 2017-07-18 PROCEDURE — 40000718 ZZHC STATISTIC PT DEPARTMENT ORTHO VISIT

## 2017-07-31 ENCOUNTER — PRENATAL OFFICE VISIT (OUTPATIENT)
Dept: OBGYN | Facility: OTHER | Age: 37
End: 2017-07-31
Attending: OBSTETRICS & GYNECOLOGY
Payer: COMMERCIAL

## 2017-07-31 VITALS
DIASTOLIC BLOOD PRESSURE: 65 MMHG | SYSTOLIC BLOOD PRESSURE: 100 MMHG | WEIGHT: 157 LBS | BODY MASS INDEX: 30.82 KG/M2 | HEART RATE: 64 BPM | HEIGHT: 60 IN

## 2017-07-31 DIAGNOSIS — Z98.891 HISTORY OF CESAREAN SECTION: ICD-10-CM

## 2017-07-31 DIAGNOSIS — Z32.01 PREGNANCY TEST POSITIVE: Primary | ICD-10-CM

## 2017-07-31 DIAGNOSIS — O09.521 ELDERLY MULTIGRAVIDA IN FIRST TRIMESTER: ICD-10-CM

## 2017-07-31 LAB
ALBUMIN UR-MCNC: 10 MG/DL
APPEARANCE UR: CLEAR
BACTERIA #/AREA URNS HPF: ABNORMAL /HPF
BILIRUB UR QL STRIP: NEGATIVE
COLOR UR AUTO: YELLOW
ERYTHROCYTE [DISTWIDTH] IN BLOOD BY AUTOMATED COUNT: 11.6 % (ref 10–15)
GLUCOSE UR STRIP-MCNC: NEGATIVE MG/DL
HCT VFR BLD AUTO: 38.5 % (ref 35–47)
HGB BLD-MCNC: 13.1 G/DL (ref 11.7–15.7)
HGB UR QL STRIP: NEGATIVE
KETONES UR STRIP-MCNC: NEGATIVE MG/DL
LEUKOCYTE ESTERASE UR QL STRIP: ABNORMAL
MCH RBC QN AUTO: 31.4 PG (ref 26.5–33)
MCHC RBC AUTO-ENTMCNC: 34 G/DL (ref 31.5–36.5)
MCV RBC AUTO: 92 FL (ref 78–100)
MUCOUS THREADS #/AREA URNS LPF: PRESENT /LPF
NITRATE UR QL: NEGATIVE
PH UR STRIP: 6.5 PH (ref 4.7–8)
PLATELET # BLD AUTO: 194 10E9/L (ref 150–450)
RBC # BLD AUTO: 4.17 10E12/L (ref 3.8–5.2)
RBC #/AREA URNS AUTO: 1 /HPF (ref 0–2)
SP GR UR STRIP: 1.02 (ref 1–1.03)
SQUAMOUS #/AREA URNS AUTO: 1 /HPF (ref 0–1)
URN SPEC COLLECT METH UR: ABNORMAL
UROBILINOGEN UR STRIP-MCNC: NORMAL MG/DL (ref 0–2)
WBC # BLD AUTO: 5.6 10E9/L (ref 4–11)
WBC #/AREA URNS AUTO: <1 /HPF (ref 0–2)

## 2017-07-31 PROCEDURE — 76817 TRANSVAGINAL US OBSTETRIC: CPT | Mod: 26 | Performed by: OBSTETRICS & GYNECOLOGY

## 2017-07-31 PROCEDURE — 76817 TRANSVAGINAL US OBSTETRIC: CPT | Mod: TC | Performed by: OBSTETRICS & GYNECOLOGY

## 2017-07-31 PROCEDURE — 81001 URINALYSIS AUTO W/SCOPE: CPT | Mod: ZL | Performed by: OBSTETRICS & GYNECOLOGY

## 2017-07-31 PROCEDURE — 99213 OFFICE O/P EST LOW 20 MIN: CPT | Mod: 25

## 2017-07-31 PROCEDURE — 36415 COLL VENOUS BLD VENIPUNCTURE: CPT | Mod: ZL | Performed by: OBSTETRICS & GYNECOLOGY

## 2017-07-31 PROCEDURE — 85027 COMPLETE CBC AUTOMATED: CPT | Mod: ZL | Performed by: OBSTETRICS & GYNECOLOGY

## 2017-07-31 PROCEDURE — 99207 ZZC COMPLICATED OB VISIT: CPT | Mod: 25 | Performed by: OBSTETRICS & GYNECOLOGY

## 2017-07-31 NOTE — MR AVS SNAPSHOT
After Visit Summary   7/31/2017    Joaquina Perez    MRN: [id number]           Patient Information     Date Of Birth          1980        Visit Information        Provider Department      7/31/2017 8:50 AM Geetha Jacobs MD [facility]        Today's Diagnoses     Pregnancy test positive    -  1    Elderly multigravida in first trimester          Care Instructions    Lab today.  Cell free DNA test after 10 weeks.  MSAFP test in lab at 16 weeks.  Level 2 ultrasound @ 18-22 weeks. You will receive a call from ultrasound to make this appt. Please contact the nurse if you have not been contacted with an appointment time by 17 weeks.  Return to clinic in 1 week.  Return for New OB visit.            Follow-ups after your visit        Your next 10 appointments already scheduled     Feb 07, 2018 10:00 AM CST   (Arrive by 9:45 AM)   PHYSICAL with Geetha Jacobs MD   [facility] ([facility] )    [address] [facility] [address]   [phone]              Who to contact     If you have questions or need follow up information about today's clinic visit or your schedule please contact [facility] directly at [phone].  Normal or non-critical lab and imaging results will be communicated to you by MyChart, letter or phone within 4 business days after the clinic has received the results. If you do not hear from us within 7 days, please contact the clinic through MyChart or phone. If you have a critical or abnormal lab result, we will notify you by phone as soon as possible.  Submit refill requests through ServiceMax or call your pharmacy and they will forward the refill request to us. Please allow 3 business days for your refill to be completed.          Additional Information About Your Visit        MyChart Information     ServiceMax gives you secure access to your electronic health record. If you see a primary care provider, you can also  send messages to your care team and make appointments. If you have questions, please call your primary care clinic.  If you do not have a primary care provider, please call [phone] and they will assist you.        Care EveryWhere ID     This is your Care EveryWhere ID. This could be used by other organizations to access your [facility] medical records  [id number]        Your Vitals Were     Pulse Height Last Period BMI (Body Mass Index)          64 5' (1.524 m) 06/12/2017 (Exact Date) 30.66 kg/m2         Blood Pressure from Last 3 Encounters:   07/31/17 100/65   07/17/17 106/68   04/11/17 90/56    Weight from Last 3 Encounters:   07/31/17 157 lb (71.2 kg)   07/17/17 157 lb (71.2 kg)   04/11/17 160 lb (72.6 kg)              We Performed the Following     CBC with platelets     UA with Microscopic reflex to Culture     US OB (Clinic Only)        Primary Care Provider Office Phone # Fax #    Seymour Alcatnar [phone]       [facility] [address]  [facility] [address]        Equal Access to Services     [facility]: Hadii aad ku hadasho Soomaali, waaxda luqadaha, qaybta kaalmada adeegyada, sherice navarro haymorgann anna gutierrez . So [facility] [phone].    ATENCIÓN: Si habla español, tiene a moody disposición servicios gratuitos de asistencia lingüística. Llame al [phone].    We comply with applicable federal civil rights laws and [place] laws. We do not discriminate on the basis of race, color, national origin, age, disability sex, sexual orientation or gender identity.            Thank you!     Thank you for choosing [facility]  for your care. Our goal is always to provide you with excellent care. Hearing back from our patients is one way we can continue to improve our services. Please take a few minutes to complete the written survey that you may receive in the mail after your visit with us. Thank you!             Your Updated Medication List - Protect others  around you: Learn how to safely use, store and throw away your medicines at www.disposemymeds.org.          This list is accurate as of: 7/31/17  9:41 AM.  Always use your most recent med list.                   Brand Name Dispense Instructions for use Diagnosis    prenatal multivitamin  plus iron 27-0.8 MG Tabs per tablet      Take 1 tablet by mouth daily    Family planning, Depression with anxiety

## 2017-07-31 NOTE — PATIENT INSTRUCTIONS
Lab today.  Cell free DNA test after 10 weeks.  MSAFP test in lab at 16 weeks.  Level 2 ultrasound @ 18-22 weeks. You will receive a call from ultrasound to make this appt. Please contact the nurse if you have not been contacted with an appointment time by 17 weeks.  Return to clinic in 1 week.  Return for New OB visit.

## 2017-07-31 NOTE — PROGRESS NOTES
Here for doc of dates and viability  /65  Pulse 64  Ht 5' (1.524 m)  Wt 157 lb (71.2 kg)  LMP 06/12/2017 (Exact Date)  BMI 30.66 kg/m2      Uterus: normal  Gest. Sac: reg  Yolk Sac: p  Fetal Pole: 7w3d  FHT: +  Fluid: normal  Adnexa: normal  Gest Age: 7w3d  Sc = dates  Done by Geetha Jacobs MD    A: AMA   Previous pavan    P: cell free DNA after 10 wks  16 wk msafp,level II usg 20 wks for placentation  rto 1 wk  rto NOB  Hp,mscc with micro      Greater than  25 minutes were spent counseling this patient face to face in addition to the ultrasound.

## 2017-07-31 NOTE — NURSING NOTE
Chief Complaint   Patient presents with     Prenatal Care       Initial /65  Pulse 64  Ht 5' (1.524 m)  Wt 157 lb (71.2 kg)  LMP 06/12/2017 (Exact Date)  BMI 30.66 kg/m2 Estimated body mass index is 30.66 kg/(m^2) as calculated from the following:    Height as of this encounter: 5' (1.524 m).    Weight as of this encounter: 157 lb (71.2 kg).  Medication Reconciliation: complete   Sully Lorenzo

## 2017-08-08 ENCOUNTER — PRENATAL OFFICE VISIT (OUTPATIENT)
Dept: OBGYN | Facility: OTHER | Age: 37
End: 2017-08-08
Attending: OBSTETRICS & GYNECOLOGY
Payer: COMMERCIAL

## 2017-08-08 VITALS
BODY MASS INDEX: 26.16 KG/M2 | HEIGHT: 65 IN | SYSTOLIC BLOOD PRESSURE: 106 MMHG | DIASTOLIC BLOOD PRESSURE: 62 MMHG | WEIGHT: 157 LBS

## 2017-08-08 DIAGNOSIS — O09.521 ELDERLY MULTIGRAVIDA IN FIRST TRIMESTER: Primary | ICD-10-CM

## 2017-08-08 PROBLEM — O09.899 SUPERVISION OF OTHER HIGH RISK PREGNANCY, ANTEPARTUM: Status: ACTIVE | Noted: 2017-08-08

## 2017-08-08 PROCEDURE — 76817 TRANSVAGINAL US OBSTETRIC: CPT | Mod: 26 | Performed by: OBSTETRICS & GYNECOLOGY

## 2017-08-08 PROCEDURE — 99213 OFFICE O/P EST LOW 20 MIN: CPT | Mod: 25

## 2017-08-08 PROCEDURE — 76817 TRANSVAGINAL US OBSTETRIC: CPT | Mod: TC | Performed by: OBSTETRICS & GYNECOLOGY

## 2017-08-08 PROCEDURE — 99207 ZZC COMPLICATED OB VISIT: CPT | Mod: 25 | Performed by: OBSTETRICS & GYNECOLOGY

## 2017-08-08 NOTE — MR AVS SNAPSHOT
After Visit Summary   8/8/2017    Joaquina Perez    MRN: [id number]           Patient Information     Date Of Birth          1980        Visit Information        Provider Department      8/8/2017 4:00 PM Geetha Jacobs MD [facility]        Today's Diagnoses     Elderly multigravida in first trimester    -  1      Care Instructions    Cell Free DNA in lab at 10 weeks.  MSAFP test in lab at 15-16 weeks.  Level 2 ultrasound @ 18-22 weeks. You will receive a call from ultrasound to make this appt. Please contact the nurse if you have not been contacted with an appointment time by 17 weeks.  Return to clinic in 1 week.  Return for New OB  Exam at 10-12 weeks.          Follow-ups after your visit        Your next 10 appointments already scheduled     Feb 07, 2018 10:00 AM CST   (Arrive by 9:45 AM)   PHYSICAL with Geetha Jacobs MD   [facility] ([facility] )    [address] [facility] [place] [facility] [address]   [phone]              Who to contact     If you have questions or need follow up information about today's clinic visit or your schedule please contact [facility] directly at [phone].  Normal or non-critical lab and imaging results will be communicated to you by MyChart, letter or phone within 4 business days after the clinic has received the results. If you do not hear from us within 7 days, please contact the clinic through MyChart or phone. If you have a critical or abnormal lab result, we will notify you by phone as soon as possible.  Submit refill requests through [employer] or call your pharmacy and they will forward the refill request to us. Please allow 3 business days for your refill to be completed.          Additional Information About Your Visit        [employer] Information     [employer] gives you secure access to your electronic health record. If you see a primary care provider, you can also send messages to your care  "team and make appointments. If you have questions, please call your primary care clinic.  If you do not have a primary care provider, please call [phone] and they will assist you.        Care EveryWhere ID     This is your Care EveryWhere ID. This could be used by other organizations to access your [place] medical records  [id number]        Your Vitals Were     Height Last Period BMI (Body Mass Index)             5' 4.5\" (1.638 m) 06/12/2017 (Exact Date) 26.53 kg/m2          Blood Pressure from Last 3 Encounters:   08/08/17 106/62   07/31/17 100/65   07/17/17 106/68    Weight from Last 3 Encounters:   08/08/17 157 lb (71.2 kg)   07/31/17 157 lb (71.2 kg)   07/17/17 157 lb (71.2 kg)              We Performed the Following     US OB (Clinic Only)        Primary Care Provider Office Phone # Fax #    Seymour Alcantar [phone]       [facility] [address] [facility] [address]        Equal Access to Services     [facility]: Hadii aad ku hadasho Soomaali, waaxda luqadaha, qaybta kaalmada adeegyada, sherice gutierrez . So [facility] [phone].    ATENCIÓN: Si habla español, tiene a mooyd disposición servicios gratuitos de asistencia lingüística. Otilio al [phone].    We comply with applicable federal civil rights laws and [place] laws. We do not discriminate on the basis of race, color, national origin, age, disability sex, sexual orientation or gender identity.            Thank you!     Thank you for choosing [facility]  for your care. Our goal is always to provide you with excellent care. Hearing back from our patients is one way we can continue to improve our services. Please take a few minutes to complete the written survey that you may receive in the mail after your visit with us. Thank you!             Your Updated Medication List - Protect others around you: Learn how to safely use, store and throw away your medicines at " www.disposemymeds.org.          This list is accurate as of: 8/8/17  4:43 PM.  Always use your most recent med list.                   Brand Name Dispense Instructions for use Diagnosis    prenatal multivitamin plus iron 27-0.8 MG Tabs per tablet      Take 1 tablet by mouth daily    Family planning, Depression with anxiety

## 2017-08-08 NOTE — PROGRESS NOTES
Here for doc of viability and dates. Hungry    Uterus: normal  Gest. Sac: reg  Yolk Sac: p  Fetal Pole: 8w4-5 d  FHT: +  Fluid: normal    Gest Age: 8w4d  Sc = dates  Done by Geetha Jacobs MD    A: AMA with hx loss and     P: rto 1 wk  rto NOB  Cell free DNA when >10wks  MSAFP at 16 wks  Level II usg for placentation at 20 wks    Greater than  25 minutes were spent counseling this patient face to face in addition to the ultrasound.

## 2017-08-08 NOTE — PATIENT INSTRUCTIONS
Cell Free DNA in lab at 10 weeks.  MSAFP test in lab at 15-16 weeks.  Level 2 ultrasound @ 18-22 weeks. You will receive a call from ultrasound to make this appt. Please contact the nurse if you have not been contacted with an appointment time by 17 weeks.  Return to clinic in 1 week.  Return for New OB  Exam at 10-12 weeks.

## 2017-08-16 ENCOUNTER — PRENATAL OFFICE VISIT (OUTPATIENT)
Dept: OBGYN | Facility: OTHER | Age: 37
End: 2017-08-16
Attending: OBSTETRICS & GYNECOLOGY
Payer: COMMERCIAL

## 2017-08-16 VITALS
SYSTOLIC BLOOD PRESSURE: 104 MMHG | DIASTOLIC BLOOD PRESSURE: 60 MMHG | HEIGHT: 65 IN | BODY MASS INDEX: 26.99 KG/M2 | WEIGHT: 162 LBS

## 2017-08-16 DIAGNOSIS — O09.521 ELDERLY MULTIGRAVIDA IN FIRST TRIMESTER: Primary | ICD-10-CM

## 2017-08-16 PROCEDURE — 99207 ZZC PRENATAL VISIT: CPT | Mod: 25 | Performed by: OBSTETRICS & GYNECOLOGY

## 2017-08-16 PROCEDURE — 99213 OFFICE O/P EST LOW 20 MIN: CPT | Mod: 25

## 2017-08-16 PROCEDURE — 76817 TRANSVAGINAL US OBSTETRIC: CPT | Mod: 26 | Performed by: OBSTETRICS & GYNECOLOGY

## 2017-08-16 PROCEDURE — 76817 TRANSVAGINAL US OBSTETRIC: CPT | Mod: TC | Performed by: OBSTETRICS & GYNECOLOGY

## 2017-08-16 NOTE — PROGRESS NOTES
"Here for doc of dates and viability  /60  Ht 5' 4.5\" (1.638 m)  Wt 162 lb (73.5 kg)  LMP 06/12/2017 (Exact Date)  BMI 27.38 kg/m2      Uterus: normal  Gest. Sac: reg  Yolk Sac: p  Fetal Pole: 9w5d and good fm  FHT: +  Fluid: normal  Adnexa: p  Gest Age: reg  Sc = dates  Done by Geetha Jacobs MD    A: iup previous c/s    P: rto 1 wk  sched c/s bto for 3/12/17    Greater than  25  minutes were spent counseling this patient face to face in addition to the ultrasound.      "

## 2017-08-16 NOTE — MR AVS SNAPSHOT
After Visit Summary   8/16/2017    Joaquina Perez    MRN: [id number]           Patient Information     Date Of Birth          1980        Visit Information        Provider Department      8/16/2017 1:15 PM Geetha Jacobs MD [facility]        Today's Diagnoses     Elderly multigravida in first trimester    -  1       Follow-ups after your visit        Your next 10 appointments already scheduled     Aug 22, 2017  4:00 PM CDT   (Arrive by 3:45 PM)   ESTABLISHED PRENATAL with Geetha Jacobs MD   [facility] ([facility] )    [address]  [place] [address]   [phone]            Aug 28, 2017 10:10 AM CDT   (Arrive by 9:55 AM)   New Prenatal with Geetha Jacobs MD   [facility] ([facility] )    [address]  [place] [address]   [phone]            Oct 31, 2017 10:00 AM CDT   Radiology with HI ULTRASOUND 2   [facility] Ultrasound ([facility] )    [address] [place] [address]  [place] [address]   [phone]            Feb 07, 2018 10:00 AM CST   (Arrive by 9:45 AM)   PHYSICAL with Geetha Jacobs MD   [facility] ([facility] )    [address]  [place] [address]   [phone]              Who to contact     If you have questions or need follow up information about today's clinic visit or your schedule please contact [facility] ([facility]3] directly at [phone].  Normal or non-critical lab and imaging results will be communicated to you by MyChart, letter or phone within 4 business days after the clinic has received the results. If you do not hear from us within 7 days, please contact the clinic through MyChart or phone. If you have a critical or abnormal lab result, we will notify you by phone as soon as possible.  Submit refill requests through [employer] or call your pharmacy and they will forward the refill request to us. Please allow 3 business days  "for your refill to be completed.          Additional Information About Your Visit        MyChart Information     [employer] gives you secure access to your electronic health record. If you see a primary care provider, you can also send messages to your care team and make appointments. If you have questions, please call your primary care clinic.  If you do not have a primary care provider, please call [phone] and they will assist you.        Care EveryWhere ID     This is your Care EveryWhere ID. This could be used by other organizations to access your [place] medical records  [id number]        Your Vitals Were     Height Last Period BMI (Body Mass Index)             5' 4.5\" (1.638 m) 06/12/2017 (Exact Date) 27.38 kg/m2          Blood Pressure from Last 3 Encounters:   08/16/17 104/60   08/08/17 106/62   07/31/17 100/65    Weight from Last 3 Encounters:   08/16/17 162 lb (73.5 kg)   08/08/17 157 lb (71.2 kg)   07/31/17 157 lb (71.2 kg)              We Performed the Following     US OB (Clinic Only)        Primary Care Provider Office Phone # Fax #    Seymour Alcantar [phone]       [facility] [address]  [place] [address]        Equal Access to Services     ENA OCONNELL : Hadii aad ku hadasho Soomaali, waaxda luqadaha, qaybta kaalmada adeegyada, waxay idiin haymorgann anna johnson la'samira . So [facility] [phone].    ATENCIÓN: Si habla español, tiene a moody disposición servicios gratuitos de asistencia lingüística. Llame al [phone].    We comply with applicable federal civil rights laws and [place] laws. We do not discriminate on the basis of race, color, national origin, age, disability sex, sexual orientation or gender identity.            Thank you!     Thank you for choosing [facility]  for your care. Our goal is always to provide you with excellent care. Hearing back from our patients is one way we can continue to improve our services. Please take a few " minutes to complete the written survey that you may receive in the mail after your visit with us. Thank you!             Your Updated Medication List - Protect others around you: Learn how to safely use, store and throw away your medicines at www.disposemymeds.org.          This list is accurate as of: 8/16/17  1:37 PM.  Always use your most recent med list.                   Brand Name Dispense Instructions for use Diagnosis    prenatal multivitamin plus iron 27-0.8 MG Tabs per tablet      Take 1 tablet by mouth daily    Family planning, Depression with anxiety

## 2017-08-22 ENCOUNTER — PRENATAL OFFICE VISIT (OUTPATIENT)
Dept: OBGYN | Facility: OTHER | Age: 37
End: 2017-08-22
Attending: OBSTETRICS & GYNECOLOGY
Payer: COMMERCIAL

## 2017-08-22 VITALS
BODY MASS INDEX: 27.32 KG/M2 | WEIGHT: 164 LBS | HEIGHT: 65 IN | SYSTOLIC BLOOD PRESSURE: 106 MMHG | DIASTOLIC BLOOD PRESSURE: 72 MMHG

## 2017-08-22 DIAGNOSIS — Z30.2 ENCOUNTER FOR STERILIZATION: ICD-10-CM

## 2017-08-22 DIAGNOSIS — Z98.891 HISTORY OF CESAREAN SECTION: ICD-10-CM

## 2017-08-22 DIAGNOSIS — O09.521 ELDERLY MULTIGRAVIDA IN FIRST TRIMESTER: ICD-10-CM

## 2017-08-22 DIAGNOSIS — O09.891 SUPERVISION OF OTHER HIGH RISK PREGNANCY, ANTEPARTUM, FIRST TRIMESTER: Primary | ICD-10-CM

## 2017-08-22 DIAGNOSIS — Z30.09 FAMILY PLANNING: ICD-10-CM

## 2017-08-22 LAB — TSH SERPL DL<=0.005 MIU/L-ACNC: 1.22 MU/L (ref 0.4–4)

## 2017-08-22 PROCEDURE — 99214 OFFICE O/P EST MOD 30 MIN: CPT

## 2017-08-22 PROCEDURE — 84443 ASSAY THYROID STIM HORMONE: CPT | Mod: ZL | Performed by: OBSTETRICS & GYNECOLOGY

## 2017-08-22 PROCEDURE — 40000791 ZZHCL STATISTIC VERIFI PRENATAL TRISOMY 21,18,13: Mod: ZL | Performed by: OBSTETRICS & GYNECOLOGY

## 2017-08-22 PROCEDURE — 76817 TRANSVAGINAL US OBSTETRIC: CPT | Mod: 26 | Performed by: OBSTETRICS & GYNECOLOGY

## 2017-08-22 PROCEDURE — 99207 ZZC PRENATAL VISIT: CPT | Performed by: OBSTETRICS & GYNECOLOGY

## 2017-08-22 PROCEDURE — 76817 TRANSVAGINAL US OBSTETRIC: CPT | Mod: TC | Performed by: OBSTETRICS & GYNECOLOGY

## 2017-08-22 PROCEDURE — 36415 COLL VENOUS BLD VENIPUNCTURE: CPT | Mod: ZL | Performed by: OBSTETRICS & GYNECOLOGY

## 2017-08-22 PROCEDURE — 99000 SPECIMEN HANDLING OFFICE-LAB: CPT | Mod: ZL | Performed by: OBSTETRICS & GYNECOLOGY

## 2017-08-22 NOTE — MR AVS SNAPSHOT
After Visit Summary   2017    Joaquina Perez    MRN: [id number]           Patient Information     Date Of Birth          1980        Visit Information        Provider Department      2017 4:00 PM Geetha Jacobs MD [facility]        Today's Diagnoses     Supervision of other high risk pregnancy, antepartum, first trimester    -  1    History of  section        Elderly multigravida in first trimester          Care Instructions    Lab today.   section scheduled 3/6/18  Return the Wednesday prior to see Dr. Jacobs for a preop and consent appt, and the Monday after for a postop follow-up.  Return for New OB visit.               Follow-ups after your visit        Your next 10 appointments already scheduled     Aug 23, 2017 10:15 AM CDT   (Arrive by 10:00 AM)   New Visit with Randall Gu MD   [facility] ([facility] )    [address] [facility] [address]   [phone]            Aug 28, 2017 10:10 AM CDT   (Arrive by 9:55 AM)   New Prenatal with Geetha Jacobs MD   [facility] ([facility] )    [address] [facility] [address]   [phone]            Oct 31, 2017 10:00 AM CDT   Radiology with HI ULTRASOUND 2   [facility] Ultrasound ([facility] )    [address] [place] [address]   [phone]            2018 10:00 AM CST   (Arrive by 9:45 AM)   PHYSICAL with Geetha Jacobs MD   [facility] ([facility] )    [address] [facility] [address]   [phone]              Who to contact     If you have questions or need follow up information about today's clinic visit or your schedule please contact [facility] directly at [phone].  Normal or non-critical lab and imaging results will be communicated to you by MyChart, letter or phone within 4 business days after the clinic has received the results. If  "you do not hear from us within 7 days, please contact the clinic through [employer] or phone. If you have a critical or abnormal lab result, we will notify you by phone as soon as possible.  Submit refill requests through [employer] or call your pharmacy and they will forward the refill request to us. Please allow 3 business days for your refill to be completed.          Additional Information About Your Visit        [employer] Information     [employer] gives you secure access to your electronic health record. If you see a primary care provider, you can also send messages to your care team and make appointments. If you have questions, please call your primary care clinic.  If you do not have a primary care provider, please call [phone] and they will assist you.        Care EveryWhere ID     This is your Care EveryWhere ID. This could be used by other organizations to access your [facility] medical records  [id number]        Your Vitals Were     Height Last Period BMI (Body Mass Index)             5' 4.5\" (1.638 m) 06/12/2017 (Exact Date) 27.72 kg/m2          Blood Pressure from Last 3 Encounters:   08/22/17 106/72   08/16/17 104/60   08/08/17 106/62    Weight from Last 3 Encounters:   08/22/17 164 lb (74.4 kg)   08/16/17 162 lb (73.5 kg)   08/08/17 157 lb (71.2 kg)              We Performed the Following     Non Invasive Prenatal Test Cell Free DNA     SURGERY ORDER     TSH     US OB (Clinic Only)        Primary Care Provider Office Phone # Fax #    Seymour Alcantar [phone]       [facility] [address]  [facility] [address]        Equal Access to Services     [facility]: Hadii aad ku hadasho Soomaali, waaxda luqadaha, qaybta kaalmada criss, sherice stephen. So [facility] [phone].    ATENCIÓN: Si habla español, tiene a moody disposición servicios gratuitos de asistencia lingüística. Llame al [phone].    We comply with applicable federal civil rights laws " and [place] laws. We do not discriminate on the basis of race, color, national origin, age, disability sex, sexual orientation or gender identity.            Thank you!     Thank you for choosing [facility]  for your [facility]. Our goal is always to provide you with excellent care. Hearing back from our patients is one way we can continue to improve our services. Please take a few minutes to complete the written survey that you may receive in the mail after your visit with us. Thank you!             Your Updated Medication List - Protect others around you: Learn how to safely use, store and throw away your medicines at www.disposemymeds.org.          This list is accurate as of: 8/22/17  4:40 PM.  Always use your most recent med list.                   Brand Name Dispense Instructions for use Diagnosis    prenatal multivitamin plus iron 27-0.8 MG Tabs per tablet      Take 1 tablet by mouth daily    Family planning, Depression with anxiety

## 2017-08-22 NOTE — PATIENT INSTRUCTIONS
Lab today.   section scheduled 3/6/18  Return the Wednesday prior to see Dr. Jacobs for a preop and consent appt, and the Monday after for a postop follow-up.  Return for New OB visit.

## 2017-08-22 NOTE — PROGRESS NOTES
"Here for doc of dates and viability  /72  Ht 5' 4.5\" (1.638 m)  Wt 164 lb (74.4 kg)  LMP 06/12/2017 (Exact Date)  BMI 27.72 kg/m2      Uterus: normal  Gest. Sac: reg  Yolk Sac: ?  Fetal Pole: 11 wk with good FM  FHT: +  Fluid: normal  Gest Age: 11w  Sc > dates  Done by Geetha Jacobs MD    A: IUP AMA hx c/s    P: rto NOB  sched c/s bto for 3/6/18  Cell free DNA with sex,tsh today also      Greater than  25 minutes were spent counseling this patient face to face in addition to the ultrasound.      "

## 2017-08-22 NOTE — PROGRESS NOTES
Outpatient Physical Therapy Discharge Note     Patient: Joaquina Perez  : 1980    Beginning/End Dates of Reporting Period:  2017 to 2017    Referring Provider: Dr Alcantar    Therapy Diagnosis: Leg and pelvic floor pain mediated by mechanical dysfunction at sacrum and left piriformis.     Client Self Report: Patient seen [phone] for C/O leg and pelvic floor pain. In association with Dav[facility] Chen PT. Since she was in for evaluation she has discovered she is 5 weeks pregnant. The bone pain is still there. She was cognizant about standing with equal weight on right and left. Standing, the pain is only about 1/10.    Objective Measurements:  Objective Measure: Somatic dysfunction  Details: Left sacral sulcus deep and left MO prominent. Left piriformis in spasm. L3=ERLSL. Leg lengths equal, pelvis level and SI jts equally mobile.       Progress Toward Goals:   Progress limited due to patient failure to schedule follow-up appointments    Plan:  Discharge from therapy.    Discharge:    Reason for Discharge: Patient chooses to discontinue therapy.  Patient has not made expected progress due to interrupted treatment attendance.  Patient has failed to schedule further appointments.    Equipment Issued: n/a    Discharge Plan: current status of patient is unknown    Rody Yuan DPT

## 2017-08-23 ENCOUNTER — OFFICE VISIT (OUTPATIENT)
Dept: CARE COORDINATION | Facility: OTHER | Age: 37
End: 2017-08-23
Attending: PSYCHIATRY & NEUROLOGY
Payer: COMMERCIAL

## 2017-08-23 ENCOUNTER — TRANSFERRED RECORDS (OUTPATIENT)
Dept: HEALTH INFORMATION MANAGEMENT | Facility: HOSPITAL | Age: 37
End: 2017-08-23

## 2017-08-28 ENCOUNTER — PRENATAL OFFICE VISIT (OUTPATIENT)
Dept: OBGYN | Facility: OTHER | Age: 37
End: 2017-08-28
Attending: OBSTETRICS & GYNECOLOGY
Payer: COMMERCIAL

## 2017-08-28 VITALS
HEIGHT: 65 IN | DIASTOLIC BLOOD PRESSURE: 70 MMHG | SYSTOLIC BLOOD PRESSURE: 102 MMHG | WEIGHT: 162 LBS | BODY MASS INDEX: 26.99 KG/M2

## 2017-08-28 DIAGNOSIS — Z98.891 HISTORY OF CESAREAN SECTION: ICD-10-CM

## 2017-08-28 DIAGNOSIS — O09.891 SUPERVISION OF OTHER HIGH RISK PREGNANCY, ANTEPARTUM, FIRST TRIMESTER: ICD-10-CM

## 2017-08-28 DIAGNOSIS — O09.521 ELDERLY MULTIGRAVIDA IN FIRST TRIMESTER: Primary | ICD-10-CM

## 2017-08-28 PROBLEM — F43.23 ADJUSTMENT DISORDER WITH MIXED ANXIETY AND DEPRESSED MOOD: Status: ACTIVE | Noted: 2017-08-28

## 2017-08-28 PROCEDURE — 99213 OFFICE O/P EST LOW 20 MIN: CPT

## 2017-08-28 PROCEDURE — 99207 ZZC FIRST OB VISIT: CPT | Performed by: OBSTETRICS & GYNECOLOGY

## 2017-08-28 NOTE — PATIENT INSTRUCTIONS
MSAFP blood test in lab (screen for open neural tube defects) @ approximately 16 weeks. (can not be drawn any earlier than 15 weeks)    Level 2 ultrasound @ 18-22 weeks. You will receive a call from ultrasound to make this appt. Please contact the nurse if you have not been contacted with an appointment time by 16 weeks. If you need to reschedule your nuchal translucency ultrasound or Level 2 ultrasound; please call ultrasound immediately as these appointment times fill quickly.     Tdap vaccine at 27 weeks  Flu shot in fall    Return to clinic in 2 weeks

## 2017-08-28 NOTE — MR AVS SNAPSHOT
After Visit Summary   2017    Joaquina Perez    MRN: [id number]           Patient Information     Date Of Birth          1980        Visit Information        Provider Department      2017 10:10 AM Geetha Jacobs MD [facility]        Today's Diagnoses     Elderly multigravida in first trimester    -  1    Supervision of other high risk pregnancy, antepartum, first trimester        History of  section          Care Instructions    MSAFP blood test in lab (screen for open neural tube defects) @ approximately 16 weeks. (can not be drawn any earlier than 15 weeks)    Level 2 ultrasound @ 18-22 weeks. You will receive a call from ultrasound to make this appt. Please contact the nurse if you have not been contacted with an appointment time by 16 weeks. If you need to reschedule your nuchal translucency ultrasound or Level 2 ultrasound; please call ultrasound immediately as these appointment times fill quickly.     Tdap vaccine at 27 weeks  Flu shot in fall    Return to clinic in 2 weeks                  Follow-ups after your visit        Your next 10 appointments already scheduled     Oct 31, 2017 10:00 AM CDT   Radiology with HI ULTRASOUND 2   [facility] Ultrasound ([facility] )    [address] [place] [address]   [phone]            2018 10:00 AM CST   (Arrive by 9:45 AM)   PHYSICAL with Geetha Jacobs MD   [facility]    [address] [facility] [address]   [phone]            2018 10:30 AM CST   (Arrive by 10:15 AM)   Pre-Op physical with Geetha Jacobs MD   [facility] ([facility] )    [address]  [facility] [address]   [phone]            Mar 06, 2018   Procedure with Geetha Jacobs MD   [facility] ([facility] )    [address] [place] [id number]   [phone]            Mar 12, 2018  1:30 PM  "CDT   (Arrive by 1:15 PM)   Post Op with Geetha Jacobs MD   [facility] ([facility] )    [place] [address]   [phone]              Future tests that were ordered for you today     Open Future Orders        Priority Expected Expires Ordered    Alpha fetoprotein maternal screen Routine 9/19/2017 11/8/2017 8/28/2017            Who to contact     If you have questions or need follow up information about today's clinic visit or your schedule please contact [facility]] directly at [phone].  Normal or non-critical lab and imaging results will be communicated to you by MyChart, letter or phone within 4 business days after the clinic has received the results. If you do not hear from us within 7 days, please contact the clinic through [employer] or phone. If you have a critical or abnormal lab result, we will notify you by phone as soon as possible.  Submit refill requests through [employer] or call your pharmacy and they will forward the refill request to us. Please allow 3 business days for your refill to be completed.          Additional Information About Your Visit        MyChart Information     [employer] gives you secure access to your electronic health record. If you see a primary care provider, you can also send messages to your care team and make appointments. If you have questions, please call your primary care clinic.  If you do not have a primary care provider, please call [phone] and they will assist you.        Care EveryWhere ID     This is your Care EveryWhere ID. This could be used by other organizations to access your [facility] medical records  [id number]        Your Vitals Were     Height Last Period BMI (Body Mass Index)             5' 4.5\" (1.638 m) 06/12/2017 (Exact Date) 27.38 kg/m2          Blood Pressure from Last 3 Encounters:   08/28/17 102/70   08/22/17 106/72   08/16/17 104/60    Weight from Last 3 Encounters:   08/28/17 " 162 lb (73.5 kg)   08/22/17 164 lb (74.4 kg)   08/16/17 162 lb (73.5 kg)               Primary Care Provider Office Phone # Fax #    Seymour Alcantar [phone]       [facility] [address] [facility] [facility] [address]        Equal Access to Services     [facility]: Hadii aad ku hadasho Soomaali, waaxda luqadaha, qaybta kaalmada adeegyada, waxay idiin hayaan adeeg kharash la'aan . So [facility] [phone].    ATENCIÓN: Si habla español, tiene a moody disposición servicios gratuitos de asistencia lingüística. Llame al [phone].    We comply with applicable federal civil rights laws and [place] laws. We do not discriminate on the basis of race, color, national origin, age, disability sex, sexual orientation or gender identity.            Thank you!     Thank you for choosing [facility]  for your care. Our goal is always to provide you with excellent care. Hearing back from our patients is one way we can continue to improve our services. Please take a few minutes to complete the written survey that you may receive in the mail after your visit with us. Thank you!             Your Updated Medication List - Protect others around you: Learn how to safely use, store and throw away your medicines at www.disposemymeds.org.          This list is accurate as of: 8/28/17 10:39 AM.  Always use your most recent med list.                   Brand Name Dispense Instructions for use Diagnosis    prenatal multivitamin plus iron 27-0.8 MG Tabs per tablet      Take 1 tablet by mouth daily    Family planning, Depression with anxiety

## 2017-08-28 NOTE — PROGRESS NOTES
NEW OB VISIT  Joaquina Perez is a 37 year old  at 11w6d presenting for a new ob visit.      Currently taking PNV? y  Other meds: n    MEDICAL HISTORY:  Diabetes: n  Hypertension: n  Heart Disease: n  Autoimmune disorder: n  Kidney Disease/UTI: n  Neurologic Disease/Epilepsy: n  Psychiatric Disease: n  Depression/Postpartum Depression: n  Varicositites/Phlebitis: n  Hepatitis/Liver Disease: n  Thyroid Dysfunction: n  Trauma/Violence: n  History of Blood Transfusion: n  Tobacco Use: n  Alcohol Use: n  Illicit/Recreational Drugs: n  D (Rh Sensitized): n  Pulmonary Disease (TB/Asthma): n  Drug/Latex Allergies/Reactions: n  Breast: n  GYN Surgery: c/s  Operations/Hospitalizations: wisdom teeth,knee x 2,  Anesthetic Complications: n  History of Abnormal Pap: n  Uterine Anomalies/MAGDIEL: n  Infertility: n  ART Treatment: n  Relevant Family History: n   Other/Comments: n    INFECTION HISTORY:  Live with someone with TB or exposed to TB: n  Patient or Partner has history of Genital Herpes: n  Rash or viral illness or fever since LMP: n  Hepatitis B or C: n  History of STI (Gonorrhea, Chlamydia, HPV, HIV, Syphilis): n  Zika exposure n  Other: n  Cats n    BABY DOC: MV             Breast feeding: y RD y      IMMUNIZATION HISTORY:  Chicken Pox: y  Flu Vaccine:  In fall  Pneumococcal if smoker or RAD:  n  Tdap: in   Gardasil: na  Other/comments: n    FAMILY HISTORY  Diabetes: pgf  Hypertension: pgf  CVA/Stroke: n  Lupus: n  Cancers: Breast  n ovarian n,colon n,uterine: n           Genetics Screening/Teratology Counseling:  Includes Patient, Baby's Father, or anyone in either family with:  Patient's age 35 years or older as of estimated date of delivery:  y  Thalassemia: MCV less than 80: n  Neural Tube defects: n  Congenital Heart Defects: n  Down syndrome: n  Daryl-Sachs: n  Canavan Disease: n  Familial Dysautonomia: n  Sickle Cell Disease or Trait: n  Hemophilia or other blood disorders: n  Muscular Dystrophy:  "n  Cystic Fibrosis: n  Kendrick's Chorea: n  Mental Retardation or Autism: mc  Other genetic or chromosomal disorders: n  Maternal Metabolic Disorder (Type 1 DM, PKU): n  Patient or baby's father with birth defects not listed above: n  Recurrent pregnancy loss or stillbirth: n  Medications (Supplements, drugs)/ Illicit/ Recreational drugs/ Alcohol since LMP: n  Other/Comments: n    Review Of Systems:   C:     NEGATIVE for fever, chills, change in weight  I:       NEGATIVE for worrisome rashes, moles or lesions  E:     NEGATIVE for vision changes or irritation  E/M: NEGATIVE for ear, mouth and throat problems  R:     NEGATIVE for significant cough or SOB  CV:   NEGATIVE for chest pain, palpitations or peripheral edema  GI:     NEGATIVE for unusual nausea, abdominal pain, heartburn, or change in bowel   :   NEGATIVE for frequency, dysuria, hematuria, vaginal discharge or bleeding  M:     NEGATIVE for significant arthralgias or myalgia  N:      NEGATIVE for weakness, dizziness or paresthesias  E:      NEGATIVE for temperature intolerance, skin/hair changes  P:      NEGATIVE for changes in mood or affect.     PHYSICAL EXAM:   /70  Ht 5' 4.5\" (1.638 m)  Wt 162 lb (73.5 kg)  LMP 06/12/2017 (Exact Date)  BMI 27.38 kg/m2   BMI: Body mass index is 27.38 kg/(m^2).  Constitutional: healthy, alert and no distress  Head: Normocephalic. No masses, lesions, tenderness or abnormalities  Neck: Neck supple. Trachea midline. No adenopathy. Thyroid symmetric, normal size.   Cardiovascular: RRR.   Respiratory: lungs clear   Breast: Breasts reveal mild symmetric fibrocystic densities, but there are no dominant, discrete, fixed or suspicious masses found.  Gastrointestinal: Abdomen soft, non-tender, non-distended. No masses, organomegaly.  Pelvic:  Vulva:  No external lesions, normal female hair distribution, no inguinal adenopathy.    Urethra:  Midline, non-tender, well supported, no discharge  Vagina:  Moist, pink, no " abnormal discharge, no lesions  Uterus:   Gravid , non-tender  Ovaries:  No masses appreciated  Rectal Exam: deferred    Musculoskeletal: extremities normal  Skin: no suspicious lesions or rashes  Psychiatric: Affect appropriate, cooperative,mentation appears normal.     Risk assessment done. Level is   high    ASSESSMENT:   Prev c/s AMA    PLAN:  Prenatal labs done  16 wk MSAFP  Cell free DNA neg  Level II Ultrasound at 20 weeks   Tdap at 27 weeks  Check MIIC  Flu shot in fall  RTO 2 wks    Greater than 25 were spent in face to face counseling and interview by me for this initial new ob visit.  Geetha Jacobs MD

## 2017-08-31 ENCOUNTER — TELEPHONE (OUTPATIENT)
Dept: OBGYN | Facility: OTHER | Age: 37
End: 2017-08-31

## 2017-08-31 ENCOUNTER — PRENATAL OFFICE VISIT (OUTPATIENT)
Dept: OBGYN | Facility: OTHER | Age: 37
End: 2017-08-31
Attending: SPECIALIST
Payer: COMMERCIAL

## 2017-08-31 ENCOUNTER — HOSPITAL ENCOUNTER (OUTPATIENT)
Dept: ULTRASOUND IMAGING | Facility: HOSPITAL | Age: 37
Discharge: HOME | End: 2017-08-31
Attending: SPECIALIST | Admitting: SPECIALIST
Payer: COMMERCIAL

## 2017-08-31 VITALS — WEIGHT: 161 LBS | SYSTOLIC BLOOD PRESSURE: 112 MMHG | DIASTOLIC BLOOD PRESSURE: 68 MMHG | BODY MASS INDEX: 27.21 KG/M2

## 2017-08-31 DIAGNOSIS — O20.9 VAGINAL BLEEDING IN PREGNANT PATIENT AT LESS THAN 20 WEEKS GESTATION: Primary | ICD-10-CM

## 2017-08-31 DIAGNOSIS — O20.0 THREATENED ABORTION: Primary | ICD-10-CM

## 2017-08-31 PROCEDURE — 99212 OFFICE O/P EST SF 10 MIN: CPT | Mod: 25

## 2017-08-31 PROCEDURE — 76801 OB US < 14 WKS SINGLE FETUS: CPT | Mod: TC | Performed by: RADIOLOGY

## 2017-08-31 PROCEDURE — 99213 OFFICE O/P EST LOW 20 MIN: CPT | Performed by: SPECIALIST

## 2017-08-31 NOTE — MR AVS SNAPSHOT
After Visit Summary   2017    Joaquina Perez    MRN: [id number]           Patient Information     Date Of Birth          1980        Visit Information        Provider Department      2017 4:00 PM Andrew Olguin MD [facility]        Today's Diagnoses     Threatened     -  1       Follow-ups after your visit        Follow-up notes from your care team     Return in about 4 weeks (around 2017).      Your next 10 appointments already scheduled     Sep 05, 2017  3:40 PM CDT   (Arrive by 3:25 PM)   [facility] [place] [facility]    [address]  [place] [address]   [phone]            Sep 12, 2017  3:50 PM CDT   (Arrive by 3:35 PM)   ESTABLISHED PRENATAL with Geetha Jacobs MD   [facility] ([facility] [place] )    [address]  [place] [address]   [phone]            Oct 31, 2017 10:00 AM CDT   Radiology with HI ULTRASOUND 2   HI Ultrasound ([facility] )    [address] [place] [address]   [phone]            2018 10:00 AM CST   (Arrive by 9:45 AM)   [facility] [place] )    [address]  [place] [address]   [phone]            2018 10:30 AM CST   (Arrive by 10:15 AM)   [facility] [place] )    [address]  [place] [address]   [phone]            Mar 06, 2018   Procedure with Geetha Jacobs MD   [facility] Periop Services ([facility] )    [address] [place] [address] [place] [address] [phone]            Mar 12, 2018  1:30 PM CDT   (Arrive by 1:15 PM)   [facility] [place] )    [address]  [place] [address]   [phone]              Who  to contact     If you have questions or need follow up information about today's clinic visit or your schedule please contact [facility] directly at [phone].  Normal or non-critical lab and imaging results will be communicated to you by MyChart, letter or phone within 4 business days after the clinic has received the results. If you do not hear from us within 7 days, please contact the clinic through MyChart or phone. If you have a critical or abnormal lab result, we will notify you by phone as soon as possible.  Submit refill requests through [employer] or call your pharmacy and they will forward the refill request to us. Please allow 3 business days for your refill to be completed.          Additional Information About Your Visit        [email or web address] Information     [employer] gives you secure access to your electronic health record. If you see a primary care provider, you can also send messages to your care team and make appointments. If you have questions, please call your primary care clinic.  If you do not have a primary care provider, please call [phone] and they will assist you.        Care EveryWhere ID     This is your Care EveryWhere ID. This could be used by other organizations to access your [place] medical records  [id number]        Your Vitals Were     Last Period BMI (Body Mass Index)                06/12/2017 (Exact Date) 27.21 kg/m2           Blood Pressure from Last 3 Encounters:   08/31/17 112/68   08/28/17 102/70   08/22/17 106/72    Weight from Last 3 Encounters:   08/31/17 161 lb (73 kg)   08/28/17 162 lb (73.5 kg)   08/22/17 164 lb (74.4 kg)              Today, you had the following     No orders found for display       Primary Care Provider Office Phone # Fax #    Seymour Alcantar [phone]       [facility] [address] [place] [address]        Equal Access to Services     ENA OCONNELL : Killian Arvizu, geraldo dimas, lionel  sherice rodriguez alex stephen. So [facility] [phone].    ATENCIÓN: Si alirio mcpherson, tiene a moody disposición servicios gratuitos de asistencia lingüística. Otiloi al [phone].    We comply with applicable federal civil rights laws and [place] laws. We do not discriminate on the basis of race, color, national origin, age, disability sex, sexual orientation or gender identity.            Thank you!     Thank you for choosing [facility]  for your care. Our goal is always to provide you with excellent care. Hearing back from our patients is one way we can continue to improve our services. Please take a few minutes to complete the written survey that you may receive in the mail after your visit with us. Thank you!             Your Updated Medication List - Protect others around you: Learn how to safely use, store and throw away your medicines at www.disposemymeds.org.          This list is accurate as of: 8/31/17 11:59 PM.  Always use your most recent med list.                   Brand Name Dispense Instructions for use Diagnosis    prenatal multivitamin plus iron 27-0.8 MG Tabs per tablet      Take 1 tablet by mouth daily    Family planning, Depression with anxiety

## 2017-08-31 NOTE — TELEPHONE ENCOUNTER
12 2/7 weeks pregnant. Patient of Dr. Jacobs. Hx of recent miscarriage in previous pregnancy and is now having small amount of bleeding and called very upset and crying wanting to be seen. Appointment made with Dr. Olguin for 4:00 today. Discussed with Dr. Olguin, ultrasound ordered. Patient will go to ultrasound at 3:10 and then come to clinic.

## 2017-08-31 NOTE — NURSING NOTE
"Chief Complaint   Patient presents with     Prenatal Care       Initial /68  Wt 161 lb (73 kg)  LMP 06/12/2017 (Exact Date)  BMI 27.21 kg/m2 Estimated body mass index is 27.21 kg/(m^2) as calculated from the following:    Height as of 8/28/17: 5' 4.5\" (1.638 m).    Weight as of this encounter: 161 lb (73 kg).  Medication Reconciliation: complete     KATHYA MONTANO      "

## 2017-09-01 NOTE — PROGRESS NOTES
Joaquina has experienced some spotting and has concerns regarding regarding the pregnancy as she had previously experienced a pregnancy loss.  Ultrasound today shows a single intrauterine gestation with crown-rump length consistent with 12 weeks 2 days gestation and EDC of March 3, 2018 no adnexal masses and no free fluid in the cul-de-sac    Patient is reassured.    Return to  Clinic in 4 weeks or when necessary.  Advised regarding normal activity, all questions answered.    Andrew Olguin MD

## 2017-09-05 ENCOUNTER — PRENATAL OFFICE VISIT (OUTPATIENT)
Dept: OBGYN | Facility: OTHER | Age: 37
End: 2017-09-05
Attending: OBSTETRICS & GYNECOLOGY
Payer: COMMERCIAL

## 2017-09-05 VITALS — DIASTOLIC BLOOD PRESSURE: 64 MMHG | SYSTOLIC BLOOD PRESSURE: 112 MMHG | BODY MASS INDEX: 28.05 KG/M2 | WEIGHT: 166 LBS

## 2017-09-05 DIAGNOSIS — O09.891 SUPERVISION OF OTHER HIGH RISK PREGNANCY, ANTEPARTUM, FIRST TRIMESTER: Primary | ICD-10-CM

## 2017-09-05 PROCEDURE — 99212 OFFICE O/P EST SF 10 MIN: CPT

## 2017-09-05 PROCEDURE — 99207 ZZC PRENATAL VISIT: CPT | Performed by: OBSTETRICS & GYNECOLOGY

## 2017-09-05 ASSESSMENT — PAIN SCALES - GENERAL: PAINLEVEL: NO PAIN (0)

## 2017-09-05 NOTE — NURSING NOTE
"Chief Complaint   Patient presents with     Prenatal Care       Initial /64 (Cuff Size: Adult Regular)  Wt 166 lb (75.3 kg)  LMP 06/12/2017 (Exact Date)  BMI 28.05 kg/m2 Estimated body mass index is 28.05 kg/(m^2) as calculated from the following:    Height as of 8/28/17: 5' 4.5\" (1.638 m).    Weight as of this encounter: 166 lb (75.3 kg).  Medication Reconciliation: trinity Garner      "

## 2017-09-05 NOTE — MR AVS SNAPSHOT
After Visit Summary   9/5/2017    Joaquina Perez    MRN: [id number]           Patient Information     Date Of Birth          1980        Visit Information        Provider Department      9/5/2017 3:40 PM Geetha Jacobs MD [facility] [place]        Today's Diagnoses     Supervision of other high risk pregnancy, antepartum, first trimester    -  1       Follow-ups after your visit        Your next 10 appointments already scheduled     Sep 12, 2017  3:50 PM CDT   (Arrive by 3:35 PM)   ESTABLISHED PRENATAL with MD Aleks Sinhab[place] ([facility] [place] )    [address]  [place] [address]   [phone]            Oct 31, 2017 10:00 AM CDT   Radiology with HI ULTRASOUND 2   HI Ultrasound ([facility] )    [address] [place] [address]   [phone]            Feb 07, 2018 10:00 AM CST   (Arrive by 9:45 AM)   PHYSICAL with MD Aleks Sinhabing ([facility] )    [address]  [place] [address]   [phone]            Feb 28, 2018 10:30 AM CST   (Arrive by 10:15 AM)   Pre-Op physical with MD Aleks Sinha [place] ([facility] )    [address]  [place] [address]   [phone]            Mar 06, 2018   Procedure with Geetha Jacobs MD   [facility] Periop Services ([facility] )    [address] [place] [address]   [phone]            Mar 12, 2018  1:30 PM CDT   (Arrive by 1:15 PM)   Post Op with MD Aleks Sinhabing ([facility] )    [address]  [place] [address]   [phone]              Who to contact     If you have questions or need follow up information about today's clinic visit or your schedule please contact [place] [place] directly at [phone].  Normal or non-critical lab and imaging results will be communicated to you by Elva  letter or phone within 4 business days after the clinic has received the results. If you do not hear from us within 7 days, please contact the clinic through [employer] or phone. If you have a critical or abnormal lab result, we will notify you by phone as soon as possible.  Submit refill requests through [employer] or call your pharmacy and they will forward the refill request to us. Please allow 3 business days for your refill to be completed.          Additional Information About Your Visit        [email or web address] Information     [employer] gives you secure access to your electronic health record. If you see a primary care provider, you can also send messages to your care team and make appointments. If you have questions, please call your primary care clinic.  If you do not have a primary care provider, please call [phone] and they will assist you.        Care EveryWhere ID     This is your Care EveryWhere ID. This could be used by other organizations to access your [place] medical records  [id number]        Your Vitals Were     Last Period BMI (Body Mass Index)                06/12/2017 (Exact Date) 28.05 kg/m2           Blood Pressure from Last 3 Encounters:   09/05/17 112/64   08/31/17 112/68   08/28/17 102/70    Weight from Last 3 Encounters:   09/05/17 166 lb (75.3 kg)   08/31/17 161 lb (73 kg)   08/28/17 162 lb (73.5 kg)              Today, you had the following     No orders found for display       Primary Care Provider Office Phone # Fax #    Seymour Alcantar [phone]       [facility] [address] [place] [place] [address]        Equal Access to Services     [facility] : Hadii aad ku hadasho Soomaali, waaxda luqadaha, qaybta kaalmada adebetsey, sherice gutierrez . So [facility] [phone].    ATENCIÓN: Si habla español, tiene a moody disposición servicios gratuitos de asistencia lingüística. Llame al [phone].    We comply with applicable federal civil rights laws  and [place] laws. We do not discriminate on the basis of race, color, national origin, age, disability sex, sexual orientation or gender identity.            Thank you!     Thank you for choosing [facility]  for your care. Our goal is always to provide you with excellent care. Hearing back from our patients is one way we can continue to improve our services. Please take a few minutes to complete the written survey that you may receive in the mail after your visit with us. Thank you!             Your Updated Medication List - Protect others around you: Learn how to safely use, store and throw away your medicines at www.disposemymeds.org.          This list is accurate as of: 9/5/17  4:16 PM.  Always use your most recent med list.                   Brand Name Dispense Instructions for use Diagnosis    prenatal multivitamin plus iron 27-0.8 MG Tabs per tablet      Take 1 tablet by mouth daily    Family planning, Depression with anxiety

## 2017-09-12 ENCOUNTER — PRENATAL OFFICE VISIT (OUTPATIENT)
Dept: OBGYN | Facility: OTHER | Age: 37
End: 2017-09-12
Attending: OBSTETRICS & GYNECOLOGY
Payer: COMMERCIAL

## 2017-09-12 VITALS — SYSTOLIC BLOOD PRESSURE: 100 MMHG | DIASTOLIC BLOOD PRESSURE: 68 MMHG | WEIGHT: 166 LBS | BODY MASS INDEX: 28.05 KG/M2

## 2017-09-12 DIAGNOSIS — O09.521 ELDERLY MULTIGRAVIDA IN FIRST TRIMESTER: Primary | ICD-10-CM

## 2017-09-12 DIAGNOSIS — Z23 NEED FOR PROPHYLACTIC VACCINATION AND INOCULATION AGAINST INFLUENZA: ICD-10-CM

## 2017-09-12 DIAGNOSIS — Z23 NEED FOR INFLUENZA VACCINATION: ICD-10-CM

## 2017-09-12 PROCEDURE — 90471 IMMUNIZATION ADMIN: CPT | Performed by: OBSTETRICS & GYNECOLOGY

## 2017-09-12 PROCEDURE — 99207 ZZC PRENATAL VISIT: CPT | Performed by: OBSTETRICS & GYNECOLOGY

## 2017-09-12 PROCEDURE — 99212 OFFICE O/P EST SF 10 MIN: CPT | Mod: 25

## 2017-09-12 PROCEDURE — 90686 IIV4 VACC NO PRSV 0.5 ML IM: CPT | Performed by: OBSTETRICS & GYNECOLOGY

## 2017-09-12 NOTE — PROGRESS NOTES
rto 2 wks  Flu shot done  Injectable Influenza Immunization Documentation    1.  Are you sick today? (Fever of 100.5 or higher on the day of the clinic)   No    2.  Have you ever had Guillain-[place] Syndrome within 6 weeks of an influenza vaccionation?  No    3. Do you have a life-threatening allergy to eggs?  No    4. Do you have a life-threatening allergy to a component of the vaccine? May include antibiotics, gelatin or latex.  No     5. Have you ever had a reaction to a dose of flu vaccine that needed immediate medical attention?  No     Form completed by ADAM MONTES    Immunization History   Administered Date(s) Administered     HepB 07/09/2014, 08/13/2014, 01/21/2015     Influenza (H1N1) 11/11/2009     Influenza Vaccine IM 3yrs+ 4 Valent IIV4 12/20/2016, 09/12/2017     MMR 12/08/1997, 07/09/2014     Mantoux 04/24/2013     Rabies - IM Fibroblast Culture 09/27/2000, 10/04/2000     Rabies: Intradermal 10/25/2000     TD (ADULT, 7+) 12/08/1997, 05/02/2000     TDAP Vaccine (Adacel) 04/24/2007     Tdap (Adacel,Boostrix) 01/01/2009     Flu shot done.  ADAM MONTES

## 2017-09-12 NOTE — MR AVS SNAPSHOT
After Visit Summary   9/12/2017    Joaquina Perez    MRN: [id number]           Patient Information     Date Of Birth          1980        Visit Information        Provider Department      9/12/2017 2:00 PM Geetha Jacobs MD [facility]        Today's Diagnoses     Elderly multigravida in first trimester    -  1    Need for prophylactic vaccination and inoculation against influenza        Need for influenza vaccination          Care Instructions    Flu shot done.  Return to clinic in 2-4 weeks.          Follow-ups after your visit        Your next 10 appointments already scheduled     Oct 31, 2017 10:00 AM CDT   Radiology with HI ULTRASOUND 2   HI Ultrasound ([facility] )    [address] [place] [address]   [phone]            Feb 07, 2018 10:00 AM CST   (Arrive by 9:45 AM)   [facility] [place] [facility]    [address]  [place] [address]   [phone]            Feb 28, 2018 10:30 AM CST   (Arrive by 10:15 AM)   Pre-Op physical with Geetha Jacobs MD   [facility] [place] ([facility] [place] )    [address]  [place] [address]   [phone]            Mar 06, 2018   Procedure with Geetha Jacobs MD   HI Periop Services ([facility] )    [address] [place] [address]  [place] [address]   [phone]            Mar 12, 2018  1:30 PM CDT   (Arrive by 1:15 PM)   Post Op with Geetha Jacobs MD   [facility] [place] ([facility] [place] )    [address]  [place] [address]   [phone]              Who to contact     If you have questions or need follow up information about today's clinic visit or your schedule please contact [facility] directly at [phone].  Normal or non-critical lab and imaging results will be communicated to you by MyChart, letter or phone within 4 business days after the clinic has  received the results. If you do not hear from us within 7 days, please contact the clinic through [employer] or phone. If you have a critical or abnormal lab result, we will notify you by phone as soon as possible.  Submit refill requests through [employer] or call your pharmacy and they will forward the refill request to us. Please allow 3 business days for your refill to be completed.          Additional Information About Your Visit        [employer] Information     [employer] gives you secure access to your electronic health record. If you see a primary care provider, you can also send messages to your care team and make appointments. If you have questions, please call your primary care clinic.  If you do not have a primary care provider, please call [phone] and they will assist you.        Care EveryWhere ID     This is your Care EveryWhere ID. This could be used by other organizations to access your [place] medical records  [id number]        Your Vitals Were     Last Period BMI (Body Mass Index)                06/12/2017 (Exact Date) 28.05 kg/m2           Blood Pressure from Last 3 Encounters:   09/12/17 100/68   09/05/17 112/64   08/31/17 112/68    Weight from Last 3 Encounters:   09/12/17 166 lb (75.3 kg)   09/05/17 166 lb (75.3 kg)   08/31/17 161 lb (73 kg)              We Performed the Following     FLU VAC, SPLIT VIRUS IM > 3 YO (QUADRIVALENT) [47279]     Vaccine Administration, Initial [97313]        Primary Care Provider Office Phone # Fax #    Seymour Alcantar [phone]       [facility] [address]  [place] [address]        Equal Access to Services     [facility]: Hadii aad ku hadasho Soomaali, waaxda luqadaha, qaybta kaalmada adeegjoslyn, sherice stephen. So [facility] [phone].    ATENCIÓN: Si habla español, tiene a moody disposición servicios gratuitos de asistencia lingüística. Llame al [phone].    We comply with applicable federal civil  rights laws and [place] laws. We do not discriminate on the basis of race, color, national origin, age, disability sex, sexual orientation or gender identity.            Thank you!     Thank you for choosing [facility]  for your care. Our goal is always to provide you with excellent care. Hearing back from our patients is one way we can continue to improve our services. Please take a few minutes to complete the written survey that you may receive in the mail after your visit with us. Thank you!             Your Updated Medication List - Protect others around you: Learn how to safely use, store and throw away your medicines at www.disposemymeds.org.          This list is accurate as of: 9/12/17  5:41 PM.  Always use your most recent med list.                   Brand Name Dispense Instructions for use Diagnosis    prenatal multivitamin plus iron 27-0.8 MG Tabs per tablet      Take 1 tablet by mouth daily    Family planning, Depression with anxiety

## 2017-09-20 ENCOUNTER — OFFICE VISIT (OUTPATIENT)
Dept: FAMILY MEDICINE | Facility: OTHER | Age: 37
End: 2017-09-20
Attending: FAMILY MEDICINE
Payer: COMMERCIAL

## 2017-09-20 VITALS
DIASTOLIC BLOOD PRESSURE: 62 MMHG | HEIGHT: 65 IN | HEART RATE: 94 BPM | WEIGHT: 168 LBS | BODY MASS INDEX: 27.99 KG/M2 | SYSTOLIC BLOOD PRESSURE: 114 MMHG | TEMPERATURE: 98.4 F

## 2017-09-20 DIAGNOSIS — R53.83 FATIGUE, UNSPECIFIED TYPE: Primary | ICD-10-CM

## 2017-09-20 DIAGNOSIS — M79.604 BILATERAL LEG PAIN: ICD-10-CM

## 2017-09-20 DIAGNOSIS — F41.1 GAD (GENERALIZED ANXIETY DISORDER): ICD-10-CM

## 2017-09-20 DIAGNOSIS — M79.605 BILATERAL LEG PAIN: ICD-10-CM

## 2017-09-20 LAB
ALBUMIN SERPL-MCNC: 3.2 G/DL (ref 3.4–5)
ALP SERPL-CCNC: 37 U/L (ref 40–150)
ALT SERPL W P-5'-P-CCNC: 20 U/L (ref 0–50)
ANION GAP SERPL CALCULATED.3IONS-SCNC: 7 MMOL/L (ref 3–14)
AST SERPL W P-5'-P-CCNC: 14 U/L (ref 0–45)
BASOPHILS # BLD AUTO: 0 10E9/L (ref 0–0.2)
BASOPHILS NFR BLD AUTO: 0.3 %
BILIRUB SERPL-MCNC: 0.1 MG/DL (ref 0.2–1.3)
BUN SERPL-MCNC: 12 MG/DL (ref 7–30)
CALCIUM SERPL-MCNC: 8.3 MG/DL (ref 8.5–10.1)
CHLORIDE SERPL-SCNC: 106 MMOL/L (ref 94–109)
CO2 SERPL-SCNC: 24 MMOL/L (ref 20–32)
CREAT SERPL-MCNC: 0.58 MG/DL (ref 0.52–1.04)
CRP SERPL-MCNC: 10.6 MG/L (ref 0–8)
DIFFERENTIAL METHOD BLD: ABNORMAL
EOSINOPHIL # BLD AUTO: 0.1 10E9/L (ref 0–0.7)
EOSINOPHIL NFR BLD AUTO: 0.7 %
ERYTHROCYTE [DISTWIDTH] IN BLOOD BY AUTOMATED COUNT: 12.5 % (ref 10–15)
ERYTHROCYTE [SEDIMENTATION RATE] IN BLOOD BY WESTERGREN METHOD: 14 MM/H (ref 0–20)
GFR SERPL CREATININE-BSD FRML MDRD: >90 ML/MIN/1.7M2
GLUCOSE SERPL-MCNC: 78 MG/DL (ref 70–99)
HCT VFR BLD AUTO: 33.6 % (ref 35–47)
HGB BLD-MCNC: 11.7 G/DL (ref 11.7–15.7)
IMM GRANULOCYTES # BLD: 0 10E9/L (ref 0–0.4)
IMM GRANULOCYTES NFR BLD: 0.5 %
LYMPHOCYTES # BLD AUTO: 1.8 10E9/L (ref 0.8–5.3)
LYMPHOCYTES NFR BLD AUTO: 20.8 %
MCH RBC QN AUTO: 32.1 PG (ref 26.5–33)
MCHC RBC AUTO-ENTMCNC: 34.8 G/DL (ref 31.5–36.5)
MCV RBC AUTO: 92 FL (ref 78–100)
MONOCYTES # BLD AUTO: 0.5 10E9/L (ref 0–1.3)
MONOCYTES NFR BLD AUTO: 5.8 %
NEUTROPHILS # BLD AUTO: 6.2 10E9/L (ref 1.6–8.3)
NEUTROPHILS NFR BLD AUTO: 71.9 %
NRBC # BLD AUTO: 0 10*3/UL
NRBC BLD AUTO-RTO: 0 /100
PLATELET # BLD AUTO: 201 10E9/L (ref 150–450)
POTASSIUM SERPL-SCNC: 3.5 MMOL/L (ref 3.4–5.3)
PROT SERPL-MCNC: 6.8 G/DL (ref 6.8–8.8)
RBC # BLD AUTO: 3.64 10E12/L (ref 3.8–5.2)
SODIUM SERPL-SCNC: 137 MMOL/L (ref 133–144)
WBC # BLD AUTO: 8.6 10E9/L (ref 4–11)

## 2017-09-20 PROCEDURE — 99212 OFFICE O/P EST SF 10 MIN: CPT

## 2017-09-20 PROCEDURE — 36415 COLL VENOUS BLD VENIPUNCTURE: CPT | Mod: ZL | Performed by: FAMILY MEDICINE

## 2017-09-20 PROCEDURE — 85025 COMPLETE CBC W/AUTO DIFF WBC: CPT | Mod: ZL | Performed by: FAMILY MEDICINE

## 2017-09-20 PROCEDURE — 86140 C-REACTIVE PROTEIN: CPT | Mod: ZL | Performed by: FAMILY MEDICINE

## 2017-09-20 PROCEDURE — 99213 OFFICE O/P EST LOW 20 MIN: CPT | Performed by: FAMILY MEDICINE

## 2017-09-20 PROCEDURE — 80053 COMPREHEN METABOLIC PANEL: CPT | Mod: ZL | Performed by: FAMILY MEDICINE

## 2017-09-20 PROCEDURE — 85652 RBC SED RATE AUTOMATED: CPT | Mod: ZL | Performed by: FAMILY MEDICINE

## 2017-09-20 ASSESSMENT — ANXIETY QUESTIONNAIRES
IF YOU CHECKED OFF ANY PROBLEMS ON THIS QUESTIONNAIRE, HOW DIFFICULT HAVE THESE PROBLEMS MADE IT FOR YOU TO DO YOUR WORK, TAKE CARE OF THINGS AT HOME, OR GET ALONG WITH OTHER PEOPLE: NOT DIFFICULT AT ALL
4. TROUBLE RELAXING: NOT AT ALL
7. FEELING AFRAID AS IF SOMETHING AWFUL MIGHT HAPPEN: NOT AT ALL
6. BECOMING EASILY ANNOYED OR IRRITABLE: NOT AT ALL
5. BEING SO RESTLESS THAT IT IS HARD TO SIT STILL: NOT AT ALL
1. FEELING NERVOUS, ANXIOUS, OR ON EDGE: NOT AT ALL
3. WORRYING TOO MUCH ABOUT DIFFERENT THINGS: SEVERAL DAYS
GAD7 TOTAL SCORE: 2
2. NOT BEING ABLE TO STOP OR CONTROL WORRYING: SEVERAL DAYS

## 2017-09-20 ASSESSMENT — PATIENT HEALTH QUESTIONNAIRE - PHQ9: SUM OF ALL RESPONSES TO PHQ QUESTIONS 1-9: 1

## 2017-09-20 ASSESSMENT — PAIN SCALES - GENERAL: PAINLEVEL: NO PAIN (0)

## 2017-09-20 NOTE — PROGRESS NOTES
"  SUBJECTIVE:   Joaquina Perez is a 37 year old female who presents to clinic today for the following health issues:      Musculoskeletal problem/pain      Duration: Friday    Description  Location: right thigh    Intensity:  moderate    Accompanying signs and symptoms: pain in legs    History  Previous similar problem: no   Previous evaluation:  none    Precipitating or alleviating factors:  Trauma or overuse: no   Aggravating factors include: none    Therapies tried and outcome: nothing    fatigue      Duration: 3 weeks    Description (location/character/radiation): feels weak, achy, anxiety and chronic leg pain    Intensity:  moderate    Accompanying signs and symptoms: none    History (similar episodes/previous evaluation): 15 weeks pregant    Precipitating or alleviating factors: None    Therapies tried and outcome: None    No other sx                   Problem list and histories reviewed & adjusted, as indicated.  Additional history: as documented    Labs reviewed in EPIC    Reviewed and updated as needed this visit by clinical staffTobacco  Allergies  Meds  Med Hx  Surg Hx  Fam Hx  Soc Hx      Reviewed and updated as needed this visit by Provider         ROS:  C: NEGATIVE for fever, chills, change in weight  R: NEGATIVE for significant cough or SOB  CV: NEGATIVE for chest pain, palpitations or peripheral edema    OBJECTIVE:                                                    /62  Pulse 94  Temp 98.4  F (36.9  C)  Ht 5' 4.5\" (1.638 m)  Wt 168 lb (76.2 kg)  LMP 06/12/2017 (Exact Date)  BMI 28.39 kg/m2  Body mass index is 28.39 kg/(m^2).   GENERAL: healthy, alert, well nourished, well hydrated, no distress  HENT: ear canals- normal; TMs- normal; Nose- normal; Mouth- no ulcers, no lesions  NECK: no tenderness, no adenopathy, no asymmetry, no masses, no stiffness; thyroid- normal to palpation  RESP: lungs clear to auscultation - no rales, no rhonchi, no wheezes  CV: regular rates and " rhythm, normal S1 S2, no S3 or S4 and no murmur, no click or rub -  MS: extremities- no gross deformities noted, no edema  SKIN: no suspicious lesions, no rashes, possible small inclusion cyst of right posterior thigh        EMG reviewed  ASSESSMENT/PLAN:                                                        ICD-10-CM    1. Fatigue, unspecified type R53.83 CRP inflammation     Comprehensive metabolic panel     CBC with platelets differential     Erythrocyte sedimentation rate auto   2. PEYTON (generalized anxiety disorder) F41.1    3. Bilateral leg pain M79.604     M79.605      Will wait and see what PMR has to say. Discussed restarting an antianxiety medication.  Anxiety playing a role in this.  She is currently pregnant and does not want to do this at this time.  Lots of reassurance. See what PMR says.  Discussed in length conservative measures of OTC medications for pain, Ice/Heat, elevation and the concept of R.I.C.E.. Continue behavioral changes and proper body mechanics to allow for healing. Follow up as directed.   Symptomatic treatment was discussed along when patient should call and/or come back into the clinic or go to ER/Urgent care. All questions answered.       See Patient Instructions    Seymour Alcantar MD  [facility]

## 2017-09-20 NOTE — NURSING NOTE
"Chief Complaint   Patient presents with     Mass       Initial /62  Pulse 94  Temp 98.4  F (36.9  C)  Ht 5' 4.5\" (1.638 m)  Wt 168 lb (76.2 kg)  LMP 06/12/2017 (Exact Date)  BMI 28.39 kg/m2 Estimated body mass index is 28.39 kg/(m^2) as calculated from the following:    Height as of this encounter: 5' 4.5\" (1.638 m).    Weight as of this encounter: 168 lb (76.2 kg).  Medication Reconciliation: complete     Kelvin Serra      "

## 2017-09-21 ASSESSMENT — ANXIETY QUESTIONNAIRES: GAD7 TOTAL SCORE: 2

## 2017-09-21 NOTE — PROGRESS NOTES
September 20, 2017            Yolanda Allen MD   [facility]   [address] [place] [address]      PATIENT: Monica Shell   MEDICAL RECORD #: [id number]   YOB: 1980   APPOINTMENT TIME:  10/23/2017 at 1 p.m.       RE:  Evaluation for chronic bilateral lower extremity pain.      Dear Dr. 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  It is quite intolerable.  It is mostly in the back of her hamstrings and into her buttocks region, some discomfort in her calves, but mostly in the hamstrings.  It is very uncomfortable to the point that she has a hard time sitting.  She needs to stand.  It kind of came on all by itself and this has continued to persist for over a year now.  It is quite bothersome and it produces a lot of anxiety.  The patient does have history of general anxiety disorder and this does agitate that.  We have done some rheumatological tests throughout the year or so here with it being unremarkable.  I have done some thyroid, blood count of CBC, chem 14 and that is unremarkable.  We went on to get an EMG through Dr. Randall Gu and that came back unremarkable.  Question could this be like a piriformis syndrome or something more muscular that could be corrected?  She is doing stretches, yoga and that has not helped much.  She has also tried different chairs to sit on.        Looking forward to your evaluation and recommendation to see if you have any thoughts on this.  She is quite desperate.      I appreciate your help.         Sincerely,      CARLEE NOBLE MD             D: 09/20/2017 16:51   T: 09/20/2017 18:25   MT: [place]      Name:     MONICA CANADA   MRN:      [id number]        Account:       [id number]   :      1980      Document: [id number]

## 2017-09-26 ENCOUNTER — PRENATAL OFFICE VISIT (OUTPATIENT)
Dept: OBGYN | Facility: OTHER | Age: 37
End: 2017-09-26
Attending: OBSTETRICS & GYNECOLOGY
Payer: COMMERCIAL

## 2017-09-26 VITALS — BODY MASS INDEX: 28.56 KG/M2 | DIASTOLIC BLOOD PRESSURE: 68 MMHG | WEIGHT: 169 LBS | SYSTOLIC BLOOD PRESSURE: 100 MMHG

## 2017-09-26 DIAGNOSIS — O09.892 SUPERVISION OF OTHER HIGH RISK PREGNANCY, ANTEPARTUM, SECOND TRIMESTER: Primary | ICD-10-CM

## 2017-09-26 DIAGNOSIS — O09.891 SUPERVISION OF OTHER HIGH RISK PREGNANCY, ANTEPARTUM, FIRST TRIMESTER: ICD-10-CM

## 2017-09-26 DIAGNOSIS — O09.521 ELDERLY MULTIGRAVIDA IN FIRST TRIMESTER: ICD-10-CM

## 2017-09-26 PROCEDURE — 82105 ALPHA-FETOPROTEIN SERUM: CPT | Mod: ZL | Performed by: OBSTETRICS & GYNECOLOGY

## 2017-09-26 PROCEDURE — 99000 SPECIMEN HANDLING OFFICE-LAB: CPT | Mod: ZL | Performed by: OBSTETRICS & GYNECOLOGY

## 2017-09-26 PROCEDURE — 36415 COLL VENOUS BLD VENIPUNCTURE: CPT | Mod: ZL | Performed by: OBSTETRICS & GYNECOLOGY

## 2017-09-26 PROCEDURE — 99212 OFFICE O/P EST SF 10 MIN: CPT

## 2017-09-26 PROCEDURE — 99207 ZZC PRENATAL VISIT: CPT | Performed by: OBSTETRICS & GYNECOLOGY

## 2017-09-26 NOTE — MR AVS SNAPSHOT
After Visit Summary   9/26/2017    Joaquina Perez    MRN: [id number]           Patient Information     Date Of Birth          1980        Visit Information        Provider Department      9/26/2017 3:40 PM Geetha Jacobs MD [facility]        Today's Diagnoses     Supervision of other high risk pregnancy, antepartum, second trimester    -  1       Follow-ups after your visit        Your next 10 appointments already scheduled     Oct 31, 2017 10:00 AM CDT   Radiology with HI ULTRASOUND 2   HI Ultrasound ([facility] )    [address] [place] [address]   [phone]            Feb 07, 2018 10:00 AM CST   (Arrive by 9:45 AM)   [facility] )    [address]  [place] [address]   [phone]            Feb 28, 2018 10:30 AM CST   (Arrive by 10:15 AM)   Pre-Op physical with [facility]a Jacobs MD   [facility] )    [address]  [place] [address]   [phone]            Mar 06, 2018   Procedure with Geetha Jacobs MD   HI Periop Services ([facility] )    [address] [place] [id number]   [phone]            Mar 12, 2018  1:30 PM CDT   (Arrive by 1:15 PM)   Post Op with Ge[facility]a Jacobs MD   [facility] )    [address]  [place] [address]   [phone]              Who to contact     If you have questions or need follow up information about today's clinic visit or your schedule please contact [facility] directly at [phone].  Normal or non-critical lab and imaging results will be communicated to you by MyChart, letter or phone within 4 business days after the clinic has received the results. If you do not hear from us within 7 days, please contact the clinic through MyChart or phone. If you have a critical or abnormal lab  result, we will notify you by phone as soon as possible.  Submit refill requests through My[employer] or call your pharmacy and they will forward the refill request to us. Please allow 3 business days for your refill to be completed.          Additional Information About Your Visit        P[employer] System[employer]art Information     My[employer]ocracy gives you secure access to your electronic health record. If you see a primary care provider, you can also send messages to your care team and make appointments. If you have questions, please call your primary care clinic.  If you do not have a primary care provider, please call [phone] and they will assist you.        Care EveryWhere ID     This is your Care EveryWhere ID. This could be used by other organizations to access your [place] medical records  [id number]        Your Vitals Were     Last Period BMI (Body Mass Index)                06/12/2017 (Exact Date) 28.56 kg/m2           Blood Pressure from Last 3 Encounters:   09/26/17 100/68   09/20/17 114/62   09/12/17 100/68    Weight from Last 3 Encounters:   09/26/17 169 lb (76.7 kg)   09/20/17 168 lb (76.2 kg)   09/12/17 166 lb (75.3 kg)              Today, you had the following     No orders found for display       Primary Care Provider Office Phone # Fax #    Seymour Alcantar [phone]       [facility] [address]  [facility] [address]        Equal Access to Services     [facility] Hospital[facility] : Hadii aad ku hadasho Soomaali, waaxda luqadaha, qaybta kaalmada adeegyada, sherice navarro haysamira gutierrez . So [facility] [phone].    ATENCIÓN: Si habla español, tiene a moody disposición servicios gratuitos de asistencia lingüística. Llame al [phone].    We comply with applicable federal civil rights laws and [place] laws. We do not discriminate on the basis of race, color, national origin, age, disability sex, sexual orientation or gender identity.            Thank you!     Thank you for choosing [facility]  [place]  for your care. Our goal is always to provide you with excellent care. Hearing back from our patients is one way we can continue to improve our services. Please take a few minutes to complete the written survey that you may receive in the mail after your visit with us. Thank you!             Your Updated Medication List - Protect others around you: Learn how to safely use, store and throw away your medicines at www.disposemymeds.org.          This list is accurate as of: 9/26/17  4:44 PM.  Always use your most recent med list.                   Brand Name Dispense Instructions for use Diagnosis    prenatal multivitamin plus iron 27-0.8 MG Tabs per tablet      Take 1 tablet by mouth daily    Family planning, Depression with anxiety

## 2017-09-29 LAB
# FETUSES US: NORMAL
AFP ADJ MOM AMN: 0.9
AFP SERPL-MCNC: 28 NG/ML
AGE - REPORTED: 39 YR
DATING METHOD: NORMAL
DIABETIC AT CONCEPTION: NO
FAMILY MEMBER DISEASES HX: NO
FAMILY MEMBER DISEASES HX: NO
GA METHOD: NORMAL
GA: 16 WEEKS
HX OF HEREDITARY DISORDERS: NO
IDDM PATIENT QL: NO
INTEGRATED SCN PATIENT-IMP: NORMAL
LMP START DATE: NORMAL
PREV HX CHROMOSOME ABNORMALITY: NO
SPECIMEN DRAWN SERPL: NORMAL
TWINS: NO

## 2017-10-11 ENCOUNTER — PRENATAL OFFICE VISIT (OUTPATIENT)
Dept: OBGYN | Facility: OTHER | Age: 37
End: 2017-10-11
Attending: OBSTETRICS & GYNECOLOGY
Payer: COMMERCIAL

## 2017-10-11 VITALS — DIASTOLIC BLOOD PRESSURE: 60 MMHG | BODY MASS INDEX: 28.65 KG/M2 | WEIGHT: 169.5 LBS | SYSTOLIC BLOOD PRESSURE: 94 MMHG

## 2017-10-11 DIAGNOSIS — O09.899 SUPERVISION OF OTHER HIGH RISK PREGNANCY, ANTEPARTUM: Primary | ICD-10-CM

## 2017-10-11 PROCEDURE — 99207 ZZC PRENATAL VISIT: CPT | Performed by: OBSTETRICS & GYNECOLOGY

## 2017-10-11 PROCEDURE — 99212 OFFICE O/P EST SF 10 MIN: CPT

## 2017-10-11 NOTE — MR AVS SNAPSHOT
After Visit Summary   10/11/2017    Joaquina Perez    MRN: [id number]           Patient Information     Date Of Birth          1980        Visit Information        Provider Department      10/11/2017 11:00 AM Geetha Jacobs MD [facility]        Today's Diagnoses     Supervision of other high risk pregnancy, antepartum    -  1       Follow-ups after your visit        Your next 10 appointments already scheduled     Oct 31, 2017 10:00 AM CDT   MFM TELEMEDICINE US [place] with THANG   [facility] Ultrasound - [facility])    [address] [place] [id number] [phone]            Oct 31, 2017 10:00 AM CDT   US OB TELEMEDICINE LEVEL II COMPREHENSIVE SINGLE with HIUS2   HI ULTRASOUND ([facility] )    [address] [place] [id number] [phone]           Please bring a list of your medicines (including vitamins, minerals and over-the-counter drugs). Also, tell your doctor about any allergies you may have. Wear comfortable clothes and leave your valuables at home.  If you're less than 20 weeks drink four 8-ounce glasses of fluid an hour before your exam. If you need to empty your bladder before your exam, try to release only a little urine. Then, drink another glass of fluid.  We do not allow the use of cameras or video during the exam.  The sonographer will be happy to provide take home pictures.  You may have up to two adult family members in the exam room.  Although we encourage family participation, we ask that you consider not bringing young children to these appointments.  Background noise may interfere with the telemedicine connection.  Please call the Imaging Department at your exam site with any questions.            Oct 31, 2017 10:00 AM CDT   Radiology MD with UR RUTHANN MEDEIROS   [facility] Maternal Fetal Medicine - [facility]  [facility])    [address]  [place] [address]   [phone]           Please arrive at the time given for your first appointment. This visit is used internally to schedule the physician's time during your ultrasound.            Nov 08, 2017  3:00 PM CST   (Arrive by 2:45 PM)   Office Visit with Naldo Ceballos MD   [facility] )    [address]  [facility] [address]   [phone]           Bring a current list of meds and any records pertaining to this visit.  For Physicals, please bring immunization records and any forms needing to be filled out.  Please arrive 15 minutes early to complete paperwork and register.            Feb 07, 2018 10:00 AM CST   (Arrive by 9:45 AM)   [facility]    [address]  [place] [address]   [phone]            Feb 28, 2018 10:30 AM CST   (Arrive by 10:15 AM)   Pre[facility]Op physical with [facility] )    [address]  [place] [address]   [phone]            Mar 06, 2018   Procedure with Geetha Jacobs MD   [facility] Periop Services ([facility] )    [address] [place] [address]  [place] [id number] [phone]            Mar 12, 2018  1:30 PM CDT   (Arrive by 1:15 PM)   Post Op with [facility] MD   [facility]    [address]  [place] [address]   [phone]              Who to contact     If you have questions or need follow up information about today's clinic visit or your schedule please contact [facility] directly at [phone].  Normal or non-critical lab and imaging results will be communicated to you by MyChart, letter or phone within 4 business days after the clinic has received the results. If you do not hear from us within 7 days, please contact the clinic through MyChart or  phone. If you have a critical or abnormal lab result, we will notify you by phone as soon as possible.  Submit refill requests through App[employer] or call your pharmacy and they will forward the refill request to us. Please allow 3 business days for your refill to be completed.          Additional Information About Your Visit        MyChart Information     [employer] gives you secure access to your electronic health record. If you see a primary care provider, you can also send messages to your care team and make appointments. If you have questions, please call your primary care clinic.  If you do not have a primary care provider, please call [phone] and they will assist you.        Care EveryWhere ID     This is your Care EveryWhere ID. This could be used by other organizations to access your [place] medical records  [id number]        Your Vitals Were     Last Period BMI (Body Mass Index)                06/12/2017 (Exact Date) 28.65 kg/m2           Blood Pressure from Last 3 Encounters:   10/11/17 94/60   09/26/17 100/68   09/20/17 114/62    Weight from Last 3 Encounters:   10/11/17 169 lb 8 oz (76.9 kg)   09/26/17 169 lb (76.7 kg)   09/20/17 168 lb (76.2 kg)              Today, you had the following     No orders found for display       Primary Care Provider Office Phone # Fax #    Seymour Alcantar [phone]       [facility] [address]  [facility] [address]        Equal Access to Services     [facility] Hospital[facility] : Hadii aad ku hadasho Soomaali, waaxda luqadaha, qaybta kaalmada adeegyada, sherice gutierrez . So [facility] [phone].    ATENCIÓN: Si habla español, tiene a moody disposición servicios gratuitos de asistencia lingüística. Llame al [phone].    We comply with applicable federal civil rights laws and [place] laws. We do not discriminate on the basis of race, color, national origin, age, disability, sex, sexual orientation, or gender identity.             Thank you!     [facility] you [facility] your [facility]. Our goal is always to provide you with excellent care. Hearing back from our patients is one way we can continue to improve our services. Please take a few minutes to complete the written survey that you may receive in the mail after your visit with us. Thank you!             Your Updated Medication List - Protect others around you: Learn how to safely use, store and throw away your medicines at www.disposemymeds.org.          This list is accurate as of: 10/11/17 12:25 PM.  Always use your most recent med list.                   Brand Name Dispense Instructions for use Diagnosis    prenatal multivitamin plus iron 27-0.8 MG Tabs per tablet      Take 1 tablet by mouth daily    Family planning, Depression with anxiety

## 2017-10-23 ENCOUNTER — TRANSFERRED RECORDS (OUTPATIENT)
Dept: HEALTH INFORMATION MANAGEMENT | Facility: HOSPITAL | Age: 37
End: 2017-10-23

## 2017-10-24 ENCOUNTER — PRENATAL OFFICE VISIT (OUTPATIENT)
Dept: OBGYN | Facility: OTHER | Age: 37
End: 2017-10-24
Attending: OBSTETRICS & GYNECOLOGY
Payer: COMMERCIAL

## 2017-10-24 VITALS — DIASTOLIC BLOOD PRESSURE: 60 MMHG | SYSTOLIC BLOOD PRESSURE: 100 MMHG | BODY MASS INDEX: 29.41 KG/M2 | WEIGHT: 174 LBS

## 2017-10-24 DIAGNOSIS — O09.899 SUPERVISION OF OTHER HIGH RISK PREGNANCY, ANTEPARTUM: Primary | ICD-10-CM

## 2017-10-24 PROCEDURE — 99207 ZZC COMPLICATED OB VISIT: CPT | Performed by: OBSTETRICS & GYNECOLOGY

## 2017-10-24 PROCEDURE — 99213 OFFICE O/P EST LOW 20 MIN: CPT

## 2017-10-24 NOTE — MR AVS SNAPSHOT
After Visit Summary   10/24/2017    Joaquina Perez    MRN: [id number]           Patient Information     Date Of Birth          1980        Visit Information        Provider Department      10/24/2017 4:00 PM Geetha Jacobs MD [facility]        Today's Diagnoses     Supervision of other high risk pregnancy, antepartum    -  1       Follow-ups after your visit        Your next 10 appointments already scheduled     Oct 31, 2017 10:00 AM CDT   MFM TELEMEDICINE US COMPREHENSIVE SINGLE with THANG   [facility]ound [place] ([facility])    [address] [place] [id number] [phone]            Oct 31, 2017 10:00 AM CDT   US OB TELEMEDICINE LEVEL II COMPREHENSIVE SINGLE with HIUS2   HI ULTRASOUND ([facility] )    [address] [place] [address] [phone]           Please bring a list of your medicines (including vitamins, minerals and over-the-counter drugs). Also, tell your doctor about any allergies you may have. Wear comfortable clothes and leave your valuables at home.  If you're less than 20 weeks drink four 8-ounce glasses of fluid an hour before your exam. If you need to empty your bladder before your exam, try to release only a little urine. Then, drink another glass of fluid.  We do not allow the use of cameras or video during the exam.  The sonographer will be happy to provide take home pictures.  You may have up to two adult family members in the exam room.  Although we encourage family participation, we ask that you consider not bringing young children to these appointments.  Background noise may interfere with the telemedicine connection.  Please call the Imaging Department at your exam site with any questions.            Oct 31, 2017 10:00 AM CDT   Radiology MD with UR RUTHANN MEDEIROS   [facility]  [facility])    [address]  [place] [address]   [phone]           Please arrive at the time given for your first appointment. This visit is used internally to schedule the physician's time during your ultrasound.            Nov 08, 2017  3:00 PM CST   (Arrive by 2:45 PM)   Office Visit with Naldo Ceballos MD   [facility] )    [address]  [facility] [address]   [phone]           Bring a current list of meds and any records pertaining to this visit.  For Physicals, please bring immunization records and any forms needing to be filled out.  Please arrive 15 minutes early to complete paperwork and register.            Nov 21, 2017  8:40 AM CST   (Arrive by 8:25 AM)   ESTABL[facility]ENATAL [facility] )    [address]  [place] [address]   [phone]            Feb 28, 2018 10:30 AM CST   (Arrive by 10:15 AM)   [facility] )    [address]  [place] [address]   [phone]            Mar 06, 2018   Procedure with Geetha Jacobs MD   HI Periop Services ([facility] )    [address] [place] [address]  [place] [address] [phone]            Mar 12, 2018  1:30 PM CDT   (Arrive by 1:15 PM)   Post Op with Geetha Jacobs MD   [facility] )    [address]  [place] [address]   [phone]              Who to contact     If you have questions or need follow up information about today's clinic visit or your schedule please contact [facility] directly at [phone].  Normal or non-critical lab and imaging results will be communicated to you by MyChart, letter or phone within 4 business days after the clinic has received the results. If you do not hear from us within 7 days, please contact the  clinic through [employer] or phone. If you have a critical or abnormal lab result, we will notify you by phone as soon as possible.  Submit refill requests through [employer] or call your pharmacy and they will forward the refill request to us. Please allow 3 business days for your refill to be completed.          Additional Information About Your Visit        [facility] Information     [employer] gives you secure access to your electronic health record. If you see a primary care provider, you can also send messages to your care team and make appointments. If you have questions, please call your primary care clinic.  If you do not have a primary care provider, please call [phone] and they will assist you.        Care EveryWhere ID     This is your Care EveryWhere ID. This could be used by other organizations to access your [place] medical records  [id number]        Your Vitals Were     Last Period BMI (Body Mass Index)                06/12/2017 (Exact Date) 29.41 kg/m2           Blood Pressure from Last 3 Encounters:   10/24/17 100/60   10/11/17 94/60   09/26/17 100/68    Weight from Last 3 Encounters:   10/24/17 174 lb (78.9 kg)   10/11/17 169 lb 8 oz (76.9 kg)   09/26/17 169 lb (76.7 kg)              Today, you had the following     No orders found for display       Primary Care Provider Office Phone # Fax #    Seymour Alcantar [phone]       [facility] [address]  [place] [address]        Equal Access to Services     ENA OCONNELL AH: Hadii erika ku hadasho Soomaali, waaxda luqadaha, qaybta kaalmada adeegyada, sherice gutierrez . So [facility] [phone].    ATENCIÓN: Si habla español, tiene a moody disposición servicios gratuitos de asistencia lingüística. Llame al [phone].    We comply with applicable federal civil rights laws and [place] laws. We do not discriminate on the basis of race, color, national origin, age, disability, sex, sexual orientation, or  gender identity.            Thank you!     Thank you for choosing [facility] [place]  for your care. Our goal is always to provide you with excellent care. Hearing back from our patients is one way we can continue to improve our services. Please take a few minutes to complete the written survey that you may receive in the mail after your visit with us. Thank you!             Your Updated Medication List - Protect others around you: Learn how to safely use, store and throw away your medicines at www.disposemymeds.org.          This list is accurate as of: 10/24/17  5:02 PM.  Always use your most recent med list.                   Brand Name Dispense Instructions for use Diagnosis    prenatal multivitamin plus iron 27-0.8 MG Tabs per tablet      Take 1 tablet by mouth daily    Family planning, Depression with anxiety

## 2017-10-31 ENCOUNTER — HOSPITAL ENCOUNTER (OUTPATIENT)
Dept: ULTRASOUND IMAGING | Facility: HOSPITAL | Age: 37
Discharge: HOME | End: 2017-10-31
Attending: OBSTETRICS & GYNECOLOGY | Admitting: OBSTETRICS & GYNECOLOGY
Payer: COMMERCIAL

## 2017-10-31 ENCOUNTER — HOSPITAL ENCOUNTER (OUTPATIENT)
Dept: ULTRASOUND IMAGING | Facility: CLINIC | Age: 37
End: 2017-10-31
Attending: OBSTETRICS & GYNECOLOGY
Payer: COMMERCIAL

## 2017-10-31 DIAGNOSIS — O09.521 SUPERVISION OF HIGH RISK ELDERLY MULTIGRAVIDA IN FIRST TRIMESTER: ICD-10-CM

## 2017-10-31 PROBLEM — D25.9 FIBROID UTERUS: Status: ACTIVE | Noted: 2017-10-31

## 2017-10-31 PROCEDURE — 76811 OB US DETAILED SNGL FETUS: CPT | Mod: TC

## 2017-11-01 NOTE — PROGRESS NOTES
Outpatient Physical Therapy Discharge Note     Patient: Joaquina Perez  : 1980    Beginning/End Dates of Reporting Period:  2107 to 2017    Referring Provider: Dr. Alcantar      Therapy Diagnosis: somatic dysfunction     Client Self Report: States is so sore cannot sit for any length of time, is seing chiropractor. I transferred care to our pelvic floor PT and she also saw Rody Valencia PT. STill complained of pain and as of her last note was 5 weeks pregnant and did not return after that July visit.    Objective Measurements:                                    As of her last session with Rody, leg lengths were equal. I do not know any other objective data as  She did not return.        Outcome Measures (most recent score):  Bautista STarT Sub-Score (Q5-9): 4  Bautista STarT Total Score (all 9): 5  Oswestry Score (%): 34 %    Wasn't done as she did not return  Goals:  Goal Identifier functional   Goal Description Client will be independent with postural correction and HEP.   Target Date 17   Date Met      Progress:     Goal Identifier functional    Goal Description Client will be able to sit with no pain down legs or discomfort with transitions.   Target Date 17   Date Met      Progress:     Goal Identifier functional   Goal Description Client will have increase in strength transversus and hip abd by one grade for better function and return to leiasure activites with decreased pain 1/10 oe less.   Target Date 17   Date Met      Progress:     Goal Identifier     Goal Description     Target Date     Date Met      Progress:     Goal Identifier     Goal Description     Target Date     Date Met      Progress:     Goal Identifier     Goal Description     Target Date     Date Met      Progress:     Goal Identifier     Goal Description     Target Date     Date Met      Progress:     Goal Identifier     Goal Description     Target Date     Date Met      Progress:     Progress Toward  Goals:   Progress limited due to pregnancy and not coming back to therapy.            Plan:  Discharge from therapy.    Discharge:    Reason for Discharge: Patient chooses to discontinue therapy.    Equipment Issued:     Discharge Plan: Patient to continue home program.

## 2017-11-14 ENCOUNTER — TELEPHONE (OUTPATIENT)
Dept: OBGYN | Facility: OTHER | Age: 37
End: 2017-11-14

## 2017-11-14 NOTE — TELEPHONE ENCOUNTER
23 weeks pregnant. Has been trying to get MRI scheduled at [facility] through physical medicine. Patient states she was initially told should have MRI of lumbar spine and pelvis and then has had a lot of resistance from this office scheduling due to pregnancy. States that she called after the physical medicine doctor discussed MRI with Dr. Jacobs and they said they are aware that Dr. Jacobs is OK with MRI, but now they will only due lumbar spine and not pelvis which was what was originally discussed. She is getting fed up with being put off and wants to know if Dr. Jacobs would consider ordering pelvic and lumbar spine MRI here for her and will follow-up there for results.

## 2017-11-14 NOTE — TELEPHONE ENCOUNTER
She said he initially said that he wanted MRI of lumbar spine and pelvis because her pain seems to be in pelvic area and because she already had a lumbar MRI that didn't show any problem, but has now told her just lumbar spine and won't do the pelvic MRI. She is asking you because he has changed what he told her. She is aware that you may not be able to help with this.

## 2017-11-15 NOTE — TELEPHONE ENCOUNTER
Ph.[phone] is office for Physical Medicine (Dr. Allen) in [place] and fax is [phone]. Called Dr. Allen's office and left message for Catie or Alexandra asking how he would like MRI ordered and asked that they please call my number directly and leave a message.

## 2017-11-16 NOTE — TELEPHONE ENCOUNTER
Voicemail received from Alexandra in Dr. Allen's office stating that she has checked with him and has current recommendation is MRI lumbar spine for DJD, and leaves decision to do pelvic MRI to Dr. Jacobs, but says that if she orders pelvic MRI, she should do lumbar at same time.  Attempted to call Alexandra back, but she is not available. Left message asking if this should be with or without contrast if Dr. Jacobs does order.

## 2017-11-20 DIAGNOSIS — M79.605 BILATERAL LEG PAIN: Primary | ICD-10-CM

## 2017-11-20 DIAGNOSIS — M79.604 BILATERAL LEG PAIN: Primary | ICD-10-CM

## 2017-11-21 ENCOUNTER — PRENATAL OFFICE VISIT (OUTPATIENT)
Dept: OBGYN | Facility: OTHER | Age: 37
End: 2017-11-21
Attending: OBSTETRICS & GYNECOLOGY
Payer: COMMERCIAL

## 2017-11-21 VITALS — SYSTOLIC BLOOD PRESSURE: 90 MMHG | DIASTOLIC BLOOD PRESSURE: 58 MMHG | BODY MASS INDEX: 29.24 KG/M2 | WEIGHT: 173 LBS

## 2017-11-21 DIAGNOSIS — O09.899 SUPERVISION OF OTHER HIGH RISK PREGNANCY, ANTEPARTUM: ICD-10-CM

## 2017-11-21 DIAGNOSIS — O09.521 ELDERLY MULTIGRAVIDA IN FIRST TRIMESTER: Primary | ICD-10-CM

## 2017-11-21 PROCEDURE — 99207 ZZC PRENATAL VISIT: CPT | Performed by: OBSTETRICS & GYNECOLOGY

## 2017-11-21 PROCEDURE — 99212 OFFICE O/P EST SF 10 MIN: CPT

## 2017-11-21 NOTE — MR AVS SNAPSHOT
After Visit Summary   11/21/2017    Joaquina Perez    MRN: [id number]           Patient Information     Date Of Birth          1980        Visit Information        Provider Department      11/21/2017 8:40 AM Geetha Jacobs MD [facility]        Today's Diagnoses     Elderly multigravida in first trimester    -  1    Supervision of other high risk pregnancy, antepartum          Care Instructions    You will be contacted by diagnostic imaging to schedule your lumbar spine and pelvic MRI.    Return to clinic in 4 weeks. Stop at lab first.          Follow-ups after your visit        Your next 10 appointments already scheduled     Dec 05, 2017  1:45 PM CST   (Arrive by 1:30 PM)   Office Visit with Naldo Ceballos MD   [facility] )    [address]  [facility] [address]   [phone]           Bring a current list of meds and any records pertaining to this visit.  For Physicals, please bring immunization records and any forms needing to be filled out.  Please arrive 15 minutes early to complete paperwork and register.            Dec 19, 2017  8:30 AM CST   (Arrive by 8:15 AM)   [facility] [place] [facility]    [address]  [place] [address]   [phone]            Feb 28, 2018 10:30 AM CST   (Arrive by 10:15 AM)   [facility] )    [address]  [place] [address]   [phone]            Mar 06, 2018   Procedure with Geetha Jacobs MD   [facility] ([facility] )    [address] [place] [address]  [place] [address] [phone]            Mar 12, 2018  1:30 PM CDT   (Arrive by 1:15 PM)   [facility] )    [address]  [place] [address]   [phone]               Future tests that were ordered for you today     Open Future Orders        Priority Expected Expires Ordered    CBC with platelets Routine 12/12/2017 1/2/2018 11/21/2017    Glucose tolerance gest screen 1 hour Routine 12/12/2017 1/2/2018 11/21/2017    UA with Microscopic reflex to Culture Routine 12/12/2017 1/2/2018 11/21/2017    MR Pelvis w/o & w Contrast Routine  11/20/2018 11/20/2017    MR Lumbar Spine w/o Contrast Routine  11/20/2018 11/20/2017            Who to contact     If you have questions or need follow up information about today's clinic visit or your schedule please contact [facility] directly at [phone].  Normal or non-critical lab and imaging results will be communicated to you by SingOnhart, letter or phone within 4 business days after the clinic has received the results. If you do not hear from us within 7 days, please contact the clinic through EnterCloud Solutionst or phone. If you have a critical or abnormal lab result, we will notify you by phone as soon as possible.  Submit refill requests through Leikr or call your pharmacy and they will forward the refill request to us. Please allow 3 business days for your refill to be completed.          Additional Information About Your Visit        SingOnharGageIn Information     Leikr gives you secure access to your electronic health record. If you see a primary care provider, you can also send messages to your care team and make appointments. If you have questions, please call your primary care clinic.  If you do not have a primary care provider, please call [phone] and they will assist you.        Care EveryWhere ID     This is your Care EveryWhere ID. This could be used by other organizations to access your [place] medical records  [id number]        Your Vitals Were     Last Period BMI (Body Mass Index)                06/12/2017 (Exact Date) 29.24 kg/m2           Blood Pressure from Last 3 Encounters:   11/21/17 90/58   10/24/17  100/60   10/11/17 94/60    Weight from Last 3 Encounters:   11/21/17 173 lb (78.5 kg)   10/24/17 174 lb (78.9 kg)   10/11/17 169 lb 8 oz (76.9 kg)               Primary Care Provider Office Phone # Fax #    Seymour Alcantar [phone]       [facility] [place] [address]  [facility] [address]        Equal Access to Services     [facility] : Hadii aad ku hadasho Soomaali, waaxda luqadaha, qaybta kaalmada adeegyada, waxay idiin hayaan adeeg kharash la'aan . So [facility] [phone].    ATENCIÓN: Si habla español, tiene a moody disposición servicios gratuitos de asistencia lingüística. [facility] [phone].    We comply with applicable federal civil rights laws and [place] laws. We do not discriminate on the basis of race, color, national origin, age, disability, sex, sexual orientation, or gender identity.            Thank you!     Thank you for choosing [facility]  for your care. Our goal is always to provide you with excellent care. Hearing back from our patients is one way we can continue to improve our services. Please take a few minutes to complete the written survey that you may receive in the mail after your visit with us. Thank you!             Your Updated Medication List - Protect others around you: Learn how to safely use, store and throw away your medicines at www.disposemymeds.org.          This list is accurate as of: 11/21/17  9:11 AM.  Always use your most recent med list.                   Brand Name Dispense Instructions for use Diagnosis    prenatal multivitamin plus iron 27-0.8 MG Tabs per tablet      Take 1 tablet by mouth daily    Family planning, Depression with anxiety

## 2017-11-21 NOTE — PATIENT INSTRUCTIONS
You will be contacted by diagnostic imaging to schedule your lumbar spine and pelvic MRI.    Return to clinic in 4 weeks. Stop at lab first.

## 2017-11-22 ENCOUNTER — HOSPITAL ENCOUNTER (OUTPATIENT)
Dept: MRI IMAGING | Facility: HOSPITAL | Age: 37
Discharge: HOME | End: 2017-11-22
Attending: OBSTETRICS & GYNECOLOGY | Admitting: OBSTETRICS & GYNECOLOGY
Payer: COMMERCIAL

## 2017-11-22 DIAGNOSIS — M79.605 BILATERAL LEG PAIN: ICD-10-CM

## 2017-11-22 DIAGNOSIS — M79.604 BILATERAL LEG PAIN: ICD-10-CM

## 2017-11-22 PROCEDURE — 72148 MRI LUMBAR SPINE W/O DYE: CPT | Mod: TC

## 2017-12-12 NOTE — PROGRESS NOTES
Outpatient Physical Therapy Discharge Note     Patient: Joaquina Perez  : 1980    Beginning/End Dates of Reporting Period:   to 2017    Referring Provider: Dr. Alcantar    Therapy Diagnosis:somatic dysunction     Client Self Report: States is so sore cannot sit for any length of time, is seing chiropractor. I did refer her to our pelvic floor PT who also saw her and referred her to our indirect treatment specialist.     Objective Measurements:                                      Objectively leg lengths were equal as of her last session but I see she is pregnant and I see is going to have an MRI. I have no other objective data and will D/c at this point.      Outcome Measures (most recent score):      Goals:  Goal Identifier functional   Goal Description Client will be independent with postural correction and HEP.   Target Date 17   Date Met      Progress:     Goal Identifier functional    Goal Description Client will be able to sit with no pain down legs or discomfort with transitions.   Target Date 17   Date Met      Progress:     Goal Identifier functional   Goal Description Client will have increase in strength transversus and hip abd by one grade for better function and return to leiasure activites with decreased pain 1/10 oe less.   Target Date 17   Date Met      Progress:     Goal Identifier     Goal Description     Target Date     Date Met      Progress:     Goal Identifier     Goal Description     Target Date     Date Met      Progress:     Goal Identifier     Goal Description     Target Date     Date Met      Progress:     Goal Identifier     Goal Description     Target Date     Date Met      Progress:     Goal Identifier     Goal Description     Target Date     Date Met      Progress:     Progress Toward Goals:   Progress limited due to not seeing her since July. Seen overall for 7 visits.            Plan:  Discharge from therapy.    Discharge:    Reason for  Discharge: Patient chooses to discontinue therapy.    Equipment Issued:     Discharge Plan: Patient to continue home program.

## 2017-12-21 DIAGNOSIS — O09.899 SUPERVISION OF OTHER HIGH RISK PREGNANCY, ANTEPARTUM: ICD-10-CM

## 2017-12-21 DIAGNOSIS — O09.521 ELDERLY MULTIGRAVIDA IN FIRST TRIMESTER: ICD-10-CM

## 2017-12-21 LAB
ALBUMIN UR-MCNC: 30 MG/DL
APPEARANCE UR: ABNORMAL
BACTERIA #/AREA URNS HPF: ABNORMAL /HPF
BILIRUB UR QL STRIP: NEGATIVE
COLOR UR AUTO: YELLOW
ERYTHROCYTE [DISTWIDTH] IN BLOOD BY AUTOMATED COUNT: 12.6 % (ref 10–15)
GLUCOSE 1H P 50 G GLC PO SERPL-MCNC: 96 MG/DL (ref 60–129)
GLUCOSE UR STRIP-MCNC: NEGATIVE MG/DL
HCT VFR BLD AUTO: 32.6 % (ref 35–47)
HGB BLD-MCNC: 11.3 G/DL (ref 11.7–15.7)
HGB UR QL STRIP: NEGATIVE
KETONES UR STRIP-MCNC: NEGATIVE MG/DL
LEUKOCYTE ESTERASE UR QL STRIP: ABNORMAL
MCH RBC QN AUTO: 32.9 PG (ref 26.5–33)
MCHC RBC AUTO-ENTMCNC: 34.7 G/DL (ref 31.5–36.5)
MCV RBC AUTO: 95 FL (ref 78–100)
MUCOUS THREADS #/AREA URNS LPF: PRESENT /LPF
NITRATE UR QL: NEGATIVE
PH UR STRIP: 7 PH (ref 4.7–8)
PLATELET # BLD AUTO: 160 10E9/L (ref 150–450)
RBC # BLD AUTO: 3.43 10E12/L (ref 3.8–5.2)
RBC #/AREA URNS AUTO: 1 /HPF (ref 0–2)
SOURCE: ABNORMAL
SP GR UR STRIP: 1.02 (ref 1–1.03)
SQUAMOUS #/AREA URNS AUTO: 20 /HPF (ref 0–1)
UROBILINOGEN UR STRIP-MCNC: NORMAL MG/DL (ref 0–2)
WBC # BLD AUTO: 8 10E9/L (ref 4–11)
WBC #/AREA URNS AUTO: 2 /HPF (ref 0–2)

## 2017-12-21 PROCEDURE — 81001 URINALYSIS AUTO W/SCOPE: CPT | Mod: ZL | Performed by: OBSTETRICS & GYNECOLOGY

## 2017-12-21 PROCEDURE — 36415 COLL VENOUS BLD VENIPUNCTURE: CPT | Mod: ZL | Performed by: OBSTETRICS & GYNECOLOGY

## 2017-12-21 PROCEDURE — 85027 COMPLETE CBC AUTOMATED: CPT | Mod: ZL | Performed by: OBSTETRICS & GYNECOLOGY

## 2017-12-21 PROCEDURE — 82950 GLUCOSE TEST: CPT | Mod: ZL | Performed by: OBSTETRICS & GYNECOLOGY

## 2017-12-26 ENCOUNTER — PRENATAL OFFICE VISIT (OUTPATIENT)
Dept: OBGYN | Facility: OTHER | Age: 37
End: 2017-12-26
Attending: OBSTETRICS & GYNECOLOGY
Payer: COMMERCIAL

## 2017-12-26 VITALS
HEIGHT: 65 IN | WEIGHT: 180 LBS | SYSTOLIC BLOOD PRESSURE: 94 MMHG | BODY MASS INDEX: 29.99 KG/M2 | OXYGEN SATURATION: 99 % | DIASTOLIC BLOOD PRESSURE: 60 MMHG | HEART RATE: 90 BPM

## 2017-12-26 DIAGNOSIS — O09.521 ELDERLY MULTIGRAVIDA IN FIRST TRIMESTER: Primary | ICD-10-CM

## 2017-12-26 DIAGNOSIS — Z23 NEED FOR TDAP VACCINATION: ICD-10-CM

## 2017-12-26 DIAGNOSIS — Z23 NEED FOR VACCINATION: ICD-10-CM

## 2017-12-26 DIAGNOSIS — N89.8 VAGINAL DISCHARGE: ICD-10-CM

## 2017-12-26 LAB
SPECIMEN SOURCE: NORMAL
WET PREP SPEC: NORMAL

## 2017-12-26 PROCEDURE — 87210 SMEAR WET MOUNT SALINE/INK: CPT | Mod: ZL | Performed by: OBSTETRICS & GYNECOLOGY

## 2017-12-26 PROCEDURE — 99212 OFFICE O/P EST SF 10 MIN: CPT | Mod: 25

## 2017-12-26 PROCEDURE — 90471 IMMUNIZATION ADMIN: CPT | Performed by: OBSTETRICS & GYNECOLOGY

## 2017-12-26 PROCEDURE — 90715 TDAP VACCINE 7 YRS/> IM: CPT | Performed by: OBSTETRICS & GYNECOLOGY

## 2017-12-26 PROCEDURE — 99207 ZZC PRENATAL VISIT: CPT | Performed by: OBSTETRICS & GYNECOLOGY

## 2017-12-26 ASSESSMENT — PAIN SCALES - GENERAL: PAINLEVEL: NO PAIN (0)

## 2017-12-26 NOTE — MR AVS SNAPSHOT
After Visit Summary   12/26/2017    Joaquina Perez    MRN: [id number]           Patient Information     Date Of Birth          1980        Visit Information        Provider Department      12/26/2017 2:40 PM Geetha Jacobs MD [facility]        Today's Diagnoses     Elderly multigravida in first trimester    -  1    Vaginal discharge        Need for vaccination        Need for Tdap vaccination          Care Instructions    Wet prep done today.  Tdap done today.          Follow-ups after your visit        Your next 10 appointments already scheduled     Feb 28, 2018 10:30 AM CST   (Arrive by 10:15 AM)   Pre-Op physical with Geetha Jacobs MD   [facility] [place] ([facility] )    [address]  [place] [address]   [phone]            Mar 06, 2018   Procedure with Geetha Jacobs MD   [facility] ([facility] )    [address] [place] [address]  [place] [address] [phone]            Mar 12, 2018  1:30 PM CDT   (Arrive by 1:15 PM)   Post Op with [facility]   [facility]    [address]  [place] [address]   [phone]              Who to contact     If you have questions or need follow up information about today's clinic visit or your schedule please contact [facility] directly at [phone].  Normal or non-critical lab and imaging results will be communicated to you by MyChart, letter or phone within 4 business days after the clinic has received the results. If you do not hear from us within 7 days, please contact the clinic through MyChart or phone. If you have a critical or abnormal lab result, we will notify you by phone as soon as possible.  Submit refill requests through Adeze or call your pharmacy and they will forward the refill request to us. Please allow 3 business days for your refill to be completed.          Additional Information  "About Your Visit        MyChart Information     [employer] gives you secure access to your electronic health record. If you see a primary care provider, you can also send messages to your care team and make appointments. If you have questions, please call your primary care clinic.  If you do not have a primary care provider, please call [phone] and they will assist you.        Care EveryWhere ID     This is your Care EveryWhere ID. This could be used by other organizations to access your [place] medical records  [id number]        Your Vitals Were     Pulse Height Last Period Pulse Oximetry BMI (Body Mass Index)       90 5' 5\" (1.651 m) 06/12/2017 (Exact Date) 99% 29.95 kg/m2        Blood Pressure from Last 3 Encounters:   12/26/17 94/60   11/21/17 90/58   10/24/17 100/60    Weight from Last 3 Encounters:   12/26/17 180 lb (81.6 kg)   11/21/17 173 lb (78.5 kg)   10/24/17 174 lb (78.9 kg)              We Performed the Following     1st  Administration  [49930]     TDAP VACCINE (ADACEL) [86618.002]     Wet prep        Primary Care Provider Office Phone # Fax #    Seymour Alcantar [phone]       [facility] [address]  [facility] [address]        Equal Access to Services     [facility] : Hadii aad ku hadasho Soomaali, waaxda luqadaha, qaybta kaalmada adeegyada, waxay melanie haysamira gutierrez . So [facility] [phone].    ATENCIÓN: Si habla español, tiene a moody disposición servicios gratuitos de asistencia lingüística. Llame al [phone].    We comply with applicable federal civil rights laws and [place] laws. We do not discriminate on the basis of race, color, national origin, age, disability, sex, sexual orientation, or gender identity.            Thank you!     Thank you for choosing [facility]  for your care. Our goal is always to provide you with excellent care. Hearing back from our patients is one way we can continue to improve our services. Please " take a few minutes to complete the written survey that you may receive in the mail after your visit with us. Thank you!             Your Updated Medication List - Protect others around you: Learn how to safely use, store and throw away your medicines at www.disposemymeds.org.          This list is accurate as of: 12/26/17  3:20 PM.  Always use your most recent med list.                   Brand Name Dispense Instructions for use Diagnosis    prenatal multivitamin plus iron 27-0.8 MG Tabs per tablet      Take 1 tablet by mouth daily    Family planning, Depression with anxiety

## 2017-12-26 NOTE — NURSING NOTE
28 Week Visit    Patient education provided on the following:  Effective positioning and latch techniques  Importance of early initiation of breastfeeding  Importance of rooming-in on a 24-hour basis    We reviewed the [place] [employer] Prenatal Toolkit in the [facility] Resource Book.  Patient questions and concerns addressed and reviewed. Support and encouragement provided.      Alla Ramirez

## 2017-12-30 NOTE — PROGRESS NOTES
Outpatient Physical Therapy Discharge Note     Patient: Joaquina Perez  : 1980    Beginning/End Dates of Reporting Period:  2017 to 2017    Referring Provider: DR. Alcantar    Therapy Diagnosis: somatic dysfunction     Client Self Report: States is so sore cannot sit for any length of time, is seing chiropractor. Also saw Kobi Chen and Rody Erik as well as myself    Objective Measurements:                                      As of last treatment in July she was still hurting with somatic dysfunction present. See she was also pregnant and has since ceased coming to PT      Outcome Measures (most recent score):      Goals:  Goal Identifier functional   Goal Description Client will be independent with postural correction and HEP.   Target Date 17   Date Met      Progress:     Goal Identifier functional    Goal Description Client will be able to sit with no pain down legs or discomfort with transitions.   Target Date 17   Date Met      Progress:     Goal Identifier functional   Goal Description Client will have increase in strength transversus and hip abd by one grade for better function and return to leiGateMe activites with decreased pain 1/10 oe less.   Target Date 17   Date Met      Progress:     Goal Identifier     Goal Description     Target Date     Date Met      Progress:     Goal Identifier     Goal Description     Target Date     Date Met      Progress:     Goal Identifier     Goal Description     Target Date     Date Met      Progress:     Goal Identifier     Goal Description     Target Date     Date Met      Progress:     Goal Identifier     Goal Description     Target Date     Date Met      Progress:     Progress Toward Goals:   Progress limited due to inattendance after July            Plan:  Discharge from therapy.    Discharge:    Reason for Discharge: Patient chooses to discontinue therapy.    Equipment Issued:     Discharge Plan: Patient to  continue home program.

## 2018-01-02 ENCOUNTER — ALLIED HEALTH/NURSE VISIT (OUTPATIENT)
Dept: OBGYN | Facility: OTHER | Age: 38
End: 2018-01-02
Attending: OBSTETRICS & GYNECOLOGY
Payer: COMMERCIAL

## 2018-01-02 DIAGNOSIS — O09.899 SUPERVISION OF OTHER HIGH RISK PREGNANCY, ANTEPARTUM: Primary | ICD-10-CM

## 2018-01-02 LAB
ALBUMIN UR-MCNC: 10 MG/DL
APPEARANCE UR: CLEAR
BACTERIA #/AREA URNS HPF: ABNORMAL /HPF
BILIRUB UR QL STRIP: NEGATIVE
COLOR UR AUTO: YELLOW
GLUCOSE UR STRIP-MCNC: NEGATIVE MG/DL
HGB UR QL STRIP: NEGATIVE
KETONES UR STRIP-MCNC: NEGATIVE MG/DL
LEUKOCYTE ESTERASE UR QL STRIP: NEGATIVE
MUCOUS THREADS #/AREA URNS LPF: PRESENT /LPF
NITRATE UR QL: NEGATIVE
PH UR STRIP: 6 PH (ref 4.7–8)
RBC #/AREA URNS AUTO: 1 /HPF (ref 0–2)
SOURCE: ABNORMAL
SP GR UR STRIP: 1.02 (ref 1–1.03)
SQUAMOUS #/AREA URNS AUTO: 2 /HPF (ref 0–1)
UROBILINOGEN UR STRIP-MCNC: NORMAL MG/DL (ref 0–2)
WBC #/AREA URNS AUTO: 1 /HPF (ref 0–2)

## 2018-01-02 PROCEDURE — 81001 URINALYSIS AUTO W/SCOPE: CPT | Mod: ZL | Performed by: OBSTETRICS & GYNECOLOGY

## 2018-01-02 PROCEDURE — G0463 HOSPITAL OUTPT CLINIC VISIT: HCPCS | Mod: 25

## 2018-01-02 NOTE — MR AVS SNAPSHOT
After Visit Summary   1/2/2018    Joaquina Perez    MRN: [id number]           Patient Information     Date Of Birth          1980        Visit Information        Provider Department      1/2/2018 9:00 AM HC OB/GYN NURSE [facility]        Today's Diagnoses     Supervision of other high risk pregnancy, antepartum    -  1       Follow-ups after your visit        Your next 10 appointments already scheduled     Jan 09, 2018  3:50 PM CST   (Arrive by 3:35 PM)   ESTABLISHED PRENATAL with Ge[facility]a Jacobs MD   [facility] )    [address]  [place] [address]   [phone]            Feb 28, 2018 10:30 AM CST   (Arrive by 10:15 AM)   Pre-Op physical with Geetha Jacobs MD   [facility] )    [address]  [place] [address]   [phone]            Mar 06, 2018   Procedure with Geetha Jacobs MD   [facility] ([facility] )    [address] [place] [address]  [place] [address] [phone]            Mar 12, 2018  1:30 PM CDT   (Arrive by 1:15 PM)   Post Op with Geetha Jacobs MD   [facility] ([facility] )    [address]  [place] [address]   [phone]              Who to contact     If you have questions or need follow up information about today's clinic visit or your schedule please contact [facility] directly at [phone].  Normal or non-critical lab and imaging results will be communicated to you by MyChart, letter or phone within 4 business days after the clinic has received the results. If you do not hear from us within 7 days, please contact the clinic through Mayvennhart or phone. If you have a critical or abnormal lab result, we will notify you by phone as soon as possible.  Submit refill requests through Topic or call your pharmacy and they will forward the refill request to us. Please allow 3  business days for your refill to be completed.          Additional Information About Your Visit        MyChart Information     [employer] gives you secure access to your electronic health record. If you see a primary care provider, you can also send messages to your care team and make appointments. If you have questions, please call your primary care clinic.  If you do not have a primary care provider, please call [phone] and they will assist you.        Care EveryWhere ID     This is your Care EveryWhere ID. This could be used by other organizations to access your [place] medical records  [id number]        Your Vitals Were     Last Period                   06/12/2017 (Exact Date)            Blood Pressure from Last 3 Encounters:   12/26/17 94/60   11/21/17 90/58   10/24/17 100/60    Weight from Last 3 Encounters:   12/26/17 180 lb (81.6 kg)   11/21/17 173 lb (78.5 kg)   10/24/17 174 lb (78.9 kg)              We Performed the Following     UA with Microscopic reflex to Culture        Primary Care Provider Office Phone # Fax #    Seymour Alcantar [phone]       [facility] [address]  [place] [address]        Equal Access to Services     [facility] ANISH : Hadii aad ku hadasho Soomaali, waaxda luqadaha, qaybta kaalmada adeegyada, sherice stephen. So [facility] [phone].    ATENCIÓN: Si habla español, tiene a moody disposición servicios gratuitos de asistencia lingüística. Llame al [phone].    We comply with applicable federal civil rights laws and [place] laws. We do not discriminate on the basis of race, color, national origin, age, disability, sex, sexual orientation, or gender identity.            Thank you!     Thank you for choosing [facility]  for your care. Our goal is always to provide you with excellent care. Hearing back from our patients is one way we can continue to improve our services. Please take a few minutes to complete  the written survey that you may receive in the mail after your visit with us. Thank you!             Your Updated Medication List - Protect others around you: Learn how to safely use, store and throw away your medicines at www.disposemymeds.org.          This list is accurate as of: 1/2/18  9:13 AM.  Always use your most recent med list.                   Brand Name Dispense Instructions for use Diagnosis    prenatal multivitamin plus iron 27-0.8 MG Tabs per tablet      Take 1 tablet by mouth daily    Family planning, Depression with anxiety

## 2018-01-02 NOTE — PROGRESS NOTES
Catheter urine done for contaminated urine. Straight cath easily placed. Clear yellow urine drained. Catheter removed without difficulty. Urine sent to lab.

## 2018-01-09 ENCOUNTER — PRENATAL OFFICE VISIT (OUTPATIENT)
Dept: OBGYN | Facility: OTHER | Age: 38
End: 2018-01-09
Attending: OBSTETRICS & GYNECOLOGY
Payer: COMMERCIAL

## 2018-01-09 VITALS — SYSTOLIC BLOOD PRESSURE: 108 MMHG | WEIGHT: 183 LBS | DIASTOLIC BLOOD PRESSURE: 68 MMHG | BODY MASS INDEX: 30.45 KG/M2

## 2018-01-09 DIAGNOSIS — O09.899 SUPERVISION OF OTHER HIGH RISK PREGNANCY, ANTEPARTUM: Primary | ICD-10-CM

## 2018-01-09 DIAGNOSIS — F41.1 GAD (GENERALIZED ANXIETY DISORDER): ICD-10-CM

## 2018-01-09 DIAGNOSIS — O09.521 ELDERLY MULTIGRAVIDA IN FIRST TRIMESTER: ICD-10-CM

## 2018-01-09 DIAGNOSIS — D25.9 UTERINE LEIOMYOMA, UNSPECIFIED LOCATION: ICD-10-CM

## 2018-01-09 DIAGNOSIS — Z98.891 HISTORY OF CESAREAN SECTION: ICD-10-CM

## 2018-01-09 PROCEDURE — G0463 HOSPITAL OUTPT CLINIC VISIT: HCPCS

## 2018-01-09 PROCEDURE — 99207 ZZC PRENATAL VISIT: CPT | Performed by: OBSTETRICS & GYNECOLOGY

## 2018-01-09 NOTE — MR AVS SNAPSHOT
After Visit Summary   2018    Joaquina Perez    MRN: [id number]           Patient Information     Date Of Birth          1980        Visit Information        Provider Department      2018 3:50 PM [facility]        Today's Diagnoses     Supervision of other high risk pregnancy, antepartum    -  1    PEYTON (generalized anxiety disorder)        Uterine leiomyoma, unspecified location        Elderly multigravida in first trimester        History of  section          Care Instructions    Return to clinic in 2 weeks  You will be contacted by hospital ultrasound to schedule your 38 week estimated fetal weight ultrasound appointment. Please call the nurse at [phone] if you are not contacted in a timely manner or by 37 weeks with an appointment time.              Follow-ups after your visit        Your next 10 appointments already scheduled     2018  1:40 PM CST   (Arrive by 1:25 PM)   [facility]    [address]  [place] [address]   [phone]            2018 10:30 AM CST   (Arrive by 10:15 AM)   [facility]    [address]  [place] [address]   [phone]            Mar 06, 2018   Procedure with Geetha Jacobs MD   [facility] ([facility] )    [address] [place] [address]  [place] [address]   [phone]            Mar 12, 2018  1:30 PM CDT   (Arrive by 1:15 PM)   [facility] ([facility] [place] )    [address]  [place] [address]   [phone]              Future tests that were ordered for you today     Open Future Orders        Priority Expected Expires Ordered    US OB Single Follow Up Repeat Routine 2018 3/6/2018 2018            Who  to contact     If you have questions or need follow up information about today's clinic visit or your schedule please contact [facility] directly at [phone].  Normal or non-critical lab and imaging results will be communicated to you by MyChart, letter or phone within 4 business days after the clinic has received the results. If you do not hear from us within 7 days, please contact the clinic through MyChart or phone. If you have a critical or abnormal lab result, we will notify you by phone as soon as possible.  Submit refill requests through [employer] or call your pharmacy and they will forward the refill request to us. Please allow 3 business days for your refill to be completed.          Additional Information About Your Visit        [employer] Information     [employer] gives you secure access to your electronic health record. If you see a primary care provider, you can also send messages to your care team and make appointments. If you have questions, please call your primary care clinic.  If you do not have a primary care provider, please call [phone] and they will assist you.        Care EveryWhere ID     This is your Care EveryWhere ID. This could be used by other organizations to access your [place] medical records  [id number]        Your Vitals Were     Last Period BMI (Body Mass Index)                06/12/2017 (Exact Date) 30.45 kg/m2           Blood Pressure from Last 3 Encounters:   01/09/18 108/68   12/26/17 94/60   11/21/17 90/58    Weight from Last 3 Encounters:   01/09/18 183 lb (83 kg)   12/26/17 180 lb (81.6 kg)   11/21/17 173 lb (78.5 kg)               Primary Care Provider Office Phone # Fax #    Seymour Alcantar [phone]       [facility] [address] [facility] [address]        Equal Access to Services     ENA OCONNELL AH: Killian lopezo Sotalia, waaxda luqadaha, qaybta kaalnette kahn, sherice stephen. So  [facility] [phone].    ATENCIÓN: Si paulola ky, tiene a moody disposición servicios gratuitos de asistencia lingüística. Otilio ware [phone].    We comply with applicable federal civil rights laws and [place] laws. We do not discriminate on the basis of race, color, national origin, age, disability, sex, sexual orientation, or gender identity.            Thank you!     Thank you for choosing [facility]  for your care. Our goal is always to provide you with excellent care. Hearing back from our patients is one way we can continue to improve our services. Please take a few minutes to complete the written survey that you may receive in the mail after your visit with us. Thank you!             Your Updated Medication List - Protect others around you: Learn how to safely use, store and throw away your medicines at www.disposemymeds.org.          This list is accurate as of: 1/9/18  4:31 PM.  Always use your most recent med list.                   Brand Name Dispense Instructions for use Diagnosis    prenatal multivitamin plus iron 27-0.8 MG Tabs per tablet      Take 1 tablet by mouth daily    Family planning, Depression with anxiety

## 2018-01-09 NOTE — PATIENT INSTRUCTIONS
Return to clinic in 2 weeks  You will be contacted by hospital ultrasound to schedule yourestimated fetal weight ultrasound appointment. Please call the nurse at [phone] if you are not contacted in a timely manner or by next week with an appointment time.

## 2018-01-12 ENCOUNTER — HOSPITAL ENCOUNTER (OUTPATIENT)
Dept: ULTRASOUND IMAGING | Facility: HOSPITAL | Age: 38
Discharge: HOME | End: 2018-01-12
Attending: OBSTETRICS & GYNECOLOGY | Admitting: OBSTETRICS & GYNECOLOGY
Payer: COMMERCIAL

## 2018-01-12 DIAGNOSIS — O09.899 SUPERVISION OF OTHER HIGH RISK PREGNANCY, ANTEPARTUM: ICD-10-CM

## 2018-01-12 DIAGNOSIS — Z98.891 HISTORY OF CESAREAN SECTION: ICD-10-CM

## 2018-01-12 DIAGNOSIS — F41.1 GAD (GENERALIZED ANXIETY DISORDER): ICD-10-CM

## 2018-01-12 DIAGNOSIS — O09.521 ELDERLY MULTIGRAVIDA IN FIRST TRIMESTER: ICD-10-CM

## 2018-01-12 DIAGNOSIS — D25.9 UTERINE LEIOMYOMA, UNSPECIFIED LOCATION: ICD-10-CM

## 2018-01-12 PROCEDURE — 76816 OB US FOLLOW-UP PER FETUS: CPT | Mod: TC

## 2018-01-15 ENCOUNTER — TELEPHONE (OUTPATIENT)
Dept: OBGYN | Facility: OTHER | Age: 38
End: 2018-01-15

## 2018-01-15 NOTE — TELEPHONE ENCOUNTER
31 6/7 weeks pregnant. States caught GI bug from her [place] the [place]. Had a lot of vomiting and diarrhea and started to feel dizzy and did not feel safe driving; so she got a hotel. Has managed to keep some fluids down now and the light-headed feeling has passed. Urinating normally. Still has some vomiting and diarrhea. She will try clear liquids for 24 hours and call if not better.    Anything else to do?

## 2018-01-16 NOTE — TELEPHONE ENCOUNTER
Normal fetal movement. No leaking of fluid, bleeding or contractions. She is now feeling better and is able to eat and drink and is not having vomiting or diarrhea.

## 2018-01-22 ENCOUNTER — PRENATAL OFFICE VISIT (OUTPATIENT)
Dept: OBGYN | Facility: OTHER | Age: 38
End: 2018-01-22
Attending: OBSTETRICS & GYNECOLOGY
Payer: COMMERCIAL

## 2018-01-22 VITALS — WEIGHT: 181 LBS | DIASTOLIC BLOOD PRESSURE: 60 MMHG | SYSTOLIC BLOOD PRESSURE: 100 MMHG | BODY MASS INDEX: 30.12 KG/M2

## 2018-01-22 DIAGNOSIS — O09.899 SUPERVISION OF OTHER HIGH RISK PREGNANCY, ANTEPARTUM: ICD-10-CM

## 2018-01-22 DIAGNOSIS — O09.521 ELDERLY MULTIGRAVIDA IN FIRST TRIMESTER: Primary | ICD-10-CM

## 2018-01-22 PROCEDURE — 99207 ZZC PRENATAL VISIT: CPT | Performed by: OBSTETRICS & GYNECOLOGY

## 2018-01-22 PROCEDURE — G0463 HOSPITAL OUTPT CLINIC VISIT: HCPCS

## 2018-01-22 NOTE — PATIENT INSTRUCTIONS
Return to clinic in 2 weeks  If you think your bag of water is broken; have bleeding like a period; think you are in labor; or are worried about your baby's movement, please call the labor and delivery unit at [phone].

## 2018-01-22 NOTE — MR AVS SNAPSHOT
After Visit Summary   1/22/2018    Joaquina Perez    MRN: [id number]           Patient Information     Date Of Birth          1980        Visit Information        Provider Department      1/22/2018 1:40 PM Geetha Jacobs MD [facility]        Today's Diagnoses     Elderly multigravida in first trimester    -  1    Supervision of other high risk pregnancy, antepartum          Care Instructions    Return to clinic in 2 weeks  If you think your bag of water is broken; have bleeding like a period; think you are in labor; or are worried about your baby's movement, please call the labor and delivery unit at [phone].            Follow-ups after your visit        Your next 10 appointments already scheduled     Feb 06, 2018  1:30 PM CST   (Arrive by 1:15 PM)   [facility] [place] [facility] )    [address]  [place] [address]   [phone]            Feb 28, 2018 10:30 AM CST   (Arrive by 10:15 AM)   [facility]    [address]  [place] [address]   [phone]            Mar 06, 2018   Procedure with Geetha Jacobs MD   [facility] ([facility] )    [address] [place] [address]  [place] [address] [phone]            Mar 12, 2018  1:30 PM CDT   (Arrive by 1:15 PM)   Post Op with Geetha Jacobs MD   [facility] ([facility] )    [address]  [place] [address]   [phone]              Who to contact     If you have questions or need follow up information about today's clinic visit or your schedule please contact [facility] directly at [phone].  Normal or non-critical lab and imaging results will be communicated to you by MyChart, letter or phone within 4 business days after the clinic has received the results. If you do not  hear from us within 7 days, please contact the clinic through [employer] or phone. If you have a critical or abnormal lab result, we will notify you by phone as soon as possible.  Submit refill requests through [employer] or call your pharmacy and they will forward the refill request to us. Please allow 3 business days for your refill to be completed.          Additional Information About Your Visit        [employer] Information     [employer] gives you secure access to your electronic health record. If you see a primary care provider, you can also send messages to your care team and make appointments. If you have questions, please call your primary care clinic.  If you do not have a primary care provider, please call [phone] and they will assist you.        Care EveryWhere ID     This is your Care EveryWhere ID. This could be used by other organizations to access your [place] medical records  [id number]        Your Vitals Were     Last Period BMI (Body Mass Index)                06/12/2017 (Exact Date) 30.12 kg/m2           Blood Pressure from Last 3 Encounters:   01/22/18 100/60   01/09/18 108/68   12/26/17 94/60    Weight from Last 3 Encounters:   01/22/18 181 lb (82.1 kg)   01/09/18 183 lb (83 kg)   12/26/17 180 lb (81.6 kg)              Today, you had the following     No orders found for display       Primary Care Provider Office Phone # Fax #    Seymour Alcantar [phone]       [facility] [address]  [place] [address]        Equal Access to Services     ERIC OCONNELL AH: Hadii aad ku hadasho Soomaali, waaxda luqadaha, qaybta kaalmada adeegyada, waxay melanie gutierrez . So [facility] [phone].    ATENCIÓN: Si habla español, tiene a moody disposición servicios gratuitos de asistencia lingüística. Llame al [phone].    We comply with applicable federal civil rights laws and [place] laws. We do not discriminate on the basis of race, color, national origin, age,  disability, sex, sexual orientation, or gender identity.            Thank you!     [facility] you [facility]  [facility]. Our goal is always to provide you with excellent care. Hearing back from our patients is one way we can continue to improve our services. Please take a few minutes to complete the written survey that you may receive in the mail after your visit with us. Thank you!             Your Updated Medication List - Protect others around you: Learn how to safely use, store and throw away your medicines at www.disposemymeds.org.          This list is accurate as of: 1/22/18  2:36 PM.  Always use your most recent med list.                   Brand Name Dispense Instructions for use Diagnosis    PEPCID AC MAXIMUM STRENGTH PO      Take 10 mg by mouth        prenatal multivitamin plus iron 27-0.8 MG Tabs per tablet      Take 1 tablet by mouth daily    Family planning, Depression with anxiety       TYLENOL PO      Take 500 mg by mouth

## 2018-02-06 ENCOUNTER — PRENATAL OFFICE VISIT (OUTPATIENT)
Dept: OBGYN | Facility: OTHER | Age: 38
End: 2018-02-06
Attending: OBSTETRICS & GYNECOLOGY
Payer: COMMERCIAL

## 2018-02-06 VITALS — SYSTOLIC BLOOD PRESSURE: 92 MMHG | WEIGHT: 184 LBS | BODY MASS INDEX: 30.62 KG/M2 | DIASTOLIC BLOOD PRESSURE: 64 MMHG

## 2018-02-06 DIAGNOSIS — O09.521 ELDERLY MULTIGRAVIDA IN FIRST TRIMESTER: Primary | ICD-10-CM

## 2018-02-06 PROBLEM — D69.6 THROMBOCYTOPENIA DURING PREGNANCY (H): Status: ACTIVE | Noted: 2018-02-06

## 2018-02-06 PROBLEM — O99.119 THROMBOCYTOPENIA DURING PREGNANCY (H): Status: ACTIVE | Noted: 2018-02-06

## 2018-02-06 LAB
ERYTHROCYTE [DISTWIDTH] IN BLOOD BY AUTOMATED COUNT: 13.1 % (ref 10–15)
HCT VFR BLD AUTO: 34.1 % (ref 35–47)
HGB BLD-MCNC: 11.9 G/DL (ref 11.7–15.7)
MCH RBC QN AUTO: 33.1 PG (ref 26.5–33)
MCHC RBC AUTO-ENTMCNC: 34.9 G/DL (ref 31.5–36.5)
MCV RBC AUTO: 95 FL (ref 78–100)
PLATELET # BLD AUTO: 143 10E9/L (ref 150–450)
RBC # BLD AUTO: 3.6 10E12/L (ref 3.8–5.2)
WBC # BLD AUTO: 8.3 10E9/L (ref 4–11)

## 2018-02-06 PROCEDURE — 87653 STREP B DNA AMP PROBE: CPT | Mod: ZL | Performed by: OBSTETRICS & GYNECOLOGY

## 2018-02-06 PROCEDURE — 99207 ZZC PRENATAL VISIT: CPT | Performed by: OBSTETRICS & GYNECOLOGY

## 2018-02-06 PROCEDURE — 85027 COMPLETE CBC AUTOMATED: CPT | Mod: ZL | Performed by: OBSTETRICS & GYNECOLOGY

## 2018-02-06 PROCEDURE — 36415 COLL VENOUS BLD VENIPUNCTURE: CPT | Mod: ZL | Performed by: OBSTETRICS & GYNECOLOGY

## 2018-02-06 PROCEDURE — G0463 HOSPITAL OUTPT CLINIC VISIT: HCPCS

## 2018-02-06 NOTE — PROGRESS NOTES
36 Week Visit    Patient education provided on the following:  Baby-led feeding  Exclusivity of breastfeeding for the first 6 months  The importance of exclusive breastfeeding  Non-pharmalogical pain relief methods for labor  Frequency of feeding in relation to establishing a milk supply  Continuation of breastfeeding after introduction of appropriate complimentary foods    We reviewed the [place] [employer] Prenatal Toolkit in the [facility] Resource Book.  Patient questions and concerns addressed and reviewed. Support and encouragement provided.    ADAM MONTES

## 2018-02-06 NOTE — PATIENT INSTRUCTIONS
Return to clinic in 1 week.  Lab and Group B strep test today.  If you think your bag of water is broken; have bleeding like a period; think you are in labor; or are worried about your baby's movement, please call the labor and delivery unit at [phone].

## 2018-02-06 NOTE — MR AVS SNAPSHOT
After Visit Summary   2/6/2018    Joaquina Peerz    MRN: [id number]           Patient Information     Date Of Birth          1980        Visit Information        Provider Department      2/6/2018 1:30 PM Geetha Jacobs MD [facility]        Today's Diagnoses     Elderly multigravida in first trimester    -  1      Care Instructions    Return to clinic in 1 week.  Lab and Group B strep test today.  If you think your bag of water is broken; have bleeding like a period; think you are in labor; or are worried about your baby's movement, please call the labor and delivery unit at [phone].            Follow-ups after your visit        Your next 10 appointments already scheduled     Feb 28, 2018 10:30 AM CST   (Arrive by 10:15 AM)   Pre-Op physical with Geetha Jacobs MD   [facility] ([facility] [place] [facility] )    [address] [facility] [address]   [phone]            Mar 06, 2018   Procedure with Geetha Jacobs MD   [facility] ([facility] )    [address] [place] [id number] [phone]            Mar 12, 2018  1:30 PM CDT   (Arrive by 1:15 PM)   Post Op with Geetha Jacobs MD   [facility] ([facility] [place] [facility] )    [address] [facility] [address]   [phone]              Who to contact     If you have questions or need follow up information about today's clinic visit or your schedule please contact [facility] directly at [phone].  Normal or non-critical lab and imaging results will be communicated to you by MyChart, letter or phone within 4 business days after the clinic has received the results. If you do not hear from us within 7 days, please contact the clinic through MyChart or phone. If you have a critical or abnormal lab result, we will notify you by phone as soon as possible.  Submit refill requests through [employer] or call your pharmacy and they will  forward the refill request to us. Please allow 3 business days for your refill to be completed.          Additional Information About Your Visit        [employer] Information     [employer] gives you secure access to your electronic health record. If you see a primary care provider, you can also send messages to your care team and make appointments. If you have questions, please call your primary care clinic.  If you do not have a primary care provider, please call [phone] and they will assist you.        Care EveryWhere ID     This is your Care EveryWhere ID. This could be used by other organizations to access your [place] medical records  [id number]        Your Vitals Were     Last Period BMI (Body Mass Index)                06/12/2017 (Exact Date) 30.62 kg/m2           Blood Pressure from Last 3 Encounters:   02/06/18 92/64   01/22/18 100/60   01/09/18 108/68    Weight from Last 3 Encounters:   02/06/18 184 lb (83.5 kg)   01/22/18 181 lb (82.1 kg)   01/09/18 183 lb (83 kg)              Today, you had the following     No orders found for display       Primary Care Provider Office Phone # Fax #    Seymour Alcantar [phone]       [facility] [address]  [place] [address]        Equal Access to Services     ENA OCONNELL : Hadii aad ku hadasho Soomaali, waaxda luqadaha, qaybta kaalmada adeegyada, waxay amandain haymorgann anna johnson lasallie stephen. So [facility] [phone].    ATENCIÓN: Si habla español, tiene a moody disposición servicios gratuitos de asistencia lingüística. Llame al [phone].    We comply with applicable federal civil rights laws and [place] laws. We do not discriminate on the basis of race, color, national origin, age, disability, sex, sexual orientation, or gender identity.            Thank you!     Thank you for choosing [facility]  for your care. Our goal is always to provide you with excellent care. Hearing back from our patients is one way we can  continue to improve our services. Please take a few minutes to complete the written survey that you may receive in the mail after your visit with us. Thank you!             Your Updated Medication List - Protect others around you: Learn how to safely use, store and throw away your medicines at www.disposemymeds.org.          This list is accurate as of 2/6/18  2:08 PM.  Always use your most recent med list.                   Brand Name Dispense Instructions for use Diagnosis    PEPCID AC MAXIMUM STRENGTH PO      Take 10 mg by mouth        prenatal multivitamin plus iron 27-0.8 MG Tabs per tablet      Take 1 tablet by mouth daily    Family planning, Depression with anxiety       TYLENOL PO      Take 500 mg by mouth

## 2018-02-07 LAB
GP B STREP DNA SPEC QL NAA+PROBE: NEGATIVE
SPECIMEN SOURCE: NORMAL

## 2018-02-08 DIAGNOSIS — O99.119 THROMBOCYTOPENIA DURING PREGNANCY (H): Primary | ICD-10-CM

## 2018-02-08 DIAGNOSIS — D69.6 THROMBOCYTOPENIA DURING PREGNANCY (H): Primary | ICD-10-CM

## 2018-02-12 DIAGNOSIS — O99.119 THROMBOCYTOPENIA DURING PREGNANCY (H): ICD-10-CM

## 2018-02-12 DIAGNOSIS — D69.6 THROMBOCYTOPENIA DURING PREGNANCY (H): ICD-10-CM

## 2018-02-12 LAB — PLATELET # BLD AUTO: 153 10E9/L (ref 150–450)

## 2018-02-12 PROCEDURE — 36415 COLL VENOUS BLD VENIPUNCTURE: CPT | Mod: ZL | Performed by: OBSTETRICS & GYNECOLOGY

## 2018-02-12 PROCEDURE — 85049 AUTOMATED PLATELET COUNT: CPT | Mod: ZL | Performed by: OBSTETRICS & GYNECOLOGY

## 2018-02-13 ENCOUNTER — PRENATAL OFFICE VISIT (OUTPATIENT)
Dept: OBGYN | Facility: OTHER | Age: 38
End: 2018-02-13
Attending: OBSTETRICS & GYNECOLOGY
Payer: COMMERCIAL

## 2018-02-13 VITALS — WEIGHT: 186 LBS | BODY MASS INDEX: 30.95 KG/M2 | SYSTOLIC BLOOD PRESSURE: 98 MMHG | DIASTOLIC BLOOD PRESSURE: 62 MMHG

## 2018-02-13 DIAGNOSIS — O09.899 SUPERVISION OF OTHER HIGH RISK PREGNANCY, ANTEPARTUM: Primary | ICD-10-CM

## 2018-02-13 PROCEDURE — G0463 HOSPITAL OUTPT CLINIC VISIT: HCPCS | Mod: 25

## 2018-02-13 PROCEDURE — 99207 ZZC PRENATAL VISIT: CPT | Mod: 25 | Performed by: OBSTETRICS & GYNECOLOGY

## 2018-02-13 PROCEDURE — 76815 OB US LIMITED FETUS(S): CPT | Mod: TC | Performed by: OBSTETRICS & GYNECOLOGY

## 2018-02-13 PROCEDURE — G0463 HOSPITAL OUTPT CLINIC VISIT: HCPCS

## 2018-02-13 ASSESSMENT — PAIN SCALES - GENERAL: PAINLEVEL: NO PAIN (0)

## 2018-02-13 NOTE — MR AVS SNAPSHOT
After Visit Summary   2/13/2018    Joaquina Perez    MRN: [id number]           Patient Information     Date Of Birth          1980        Visit Information        Provider Department      2/13/2018 3:10 PM Geetha Jacobs MD [facility]        Today's Diagnoses     Supervision of other high risk pregnancy, antepartum    -  1       Follow-ups after your visit        Your next 10 appointments already scheduled     Feb 28, 2018 10:30 AM CST   (Arrive by 10:15 AM)   Pre-Op physical with Geetha Jacobs MD   [facility] ([facility] )    [address]  [facility] [address]   [phone]            Mar 06, 2018   Procedure with Geetha Jacobs MD   [facility] ([facility] )    [address] [place] [id number] [phone]            Mar 12, 2018  1:30 PM CDT   (Arrive by 1:15 PM)   Post Op with Geetha Jacobs MD   [facility] ([facility] )    [address]  [facility] [address]   [phone]              Who to contact     If you have questions or need follow up information about today's clinic visit or your schedule please contact [facility] directly at [phone].  Normal or non-critical lab and imaging results will be communicated to you by MyChart, letter or phone within 4 business days after the clinic has received the results. If you do not hear from us within 7 days, please contact the clinic through MyChart or phone. If you have a critical or abnormal lab result, we will notify you by phone as soon as possible.  Submit refill requests through [employer] or call your pharmacy and they will forward the refill request to us. Please allow 3 business days for your refill to be completed.          Additional Information About Your Visit        [employer] Information     [employer] gives you secure access to your electronic health record. If you see a primary care  provider, you can also send messages to your care team and make appointments. If you have questions, please call your primary care clinic.  If you do not have a primary care provider, please call [phone] and they will assist you.        Care EveryWhere ID     This is your Care EveryWhere ID. This could be used by other organizations to access your [place] medical records  [id number]        Your Vitals Were     Last Period BMI (Body Mass Index)                06/12/2017 (Exact Date) 30.95 kg/m2           Blood Pressure from Last 3 Encounters:   02/13/18 98/62   02/06/18 92/64   01/22/18 100/60    Weight from Last 3 Encounters:   02/13/18 186 lb (84.4 kg)   02/06/18 184 lb (83.5 kg)   01/22/18 181 lb (82.1 kg)              We Performed the Following     US OB LIMITED, 1 OR MORE FETUSES        Primary Care Provider Office Phone # Fax #    Seymour Alcantar [phone]       [facility] [address]  [place] [address]        Equal Access to Services     [facility]: Hadii aad ku hadasho Soomaali, waaxda luqadaha, qaybta kaalmada adeegyagregorio, sherice gutierrez . So [facility] [phone].    ATENCIÓN: Si habla español, tiene a moody disposición servicios gratuitos de asistencia lingüística. [facility] [phone].    We comply with applicable federal civil rights laws and [place] laws. We do not discriminate on the basis of race, color, national origin, age, disability, sex, sexual orientation, or gender identity.            Thank you!     Thank you for choosing [facility]  for your care. Our goal is always to provide you with excellent care. Hearing back from our patients is one way we can continue to improve our services. Please take a few minutes to complete the written survey that you may receive in the mail after your visit with us. Thank you!             Your Updated Medication List - Protect others around you: Learn how to safely use, store and  throw away your medicines at www.disposemymeds.org.          This list is accurate as of 2/13/18  3:31 PM.  Always use your most recent med list.                   Brand Name Dispense Instructions for use Diagnosis    PEPCID AC MAXIMUM STRENGTH PO      Take 10 mg by mouth        prenatal multivitamin plus iron 27-0.8 MG Tabs per tablet      Take 1 tablet by mouth daily    Family planning, Depression with anxiety       TYLENOL PO      Take 500 mg by mouth

## 2018-02-13 NOTE — NURSING NOTE
"Chief Complaint   Patient presents with     Prenatal Care       Initial BP 98/62 (Cuff Size: Adult Regular)  Wt 186 lb (84.4 kg)  LMP 06/12/2017 (Exact Date)  BMI 30.95 kg/m2 Estimated body mass index is 30.95 kg/(m^2) as calculated from the following:    Height as of 12/26/17: 5' 5\" (1.651 m).    Weight as of this encounter: 186 lb (84.4 kg).  Medication Reconciliation: complete   Chary Garner      "

## 2018-02-20 ENCOUNTER — MEDICAL CORRESPONDENCE (OUTPATIENT)
Dept: HEALTH INFORMATION MANAGEMENT | Facility: CLINIC | Age: 38
End: 2018-02-20

## 2018-02-20 ENCOUNTER — PRENATAL OFFICE VISIT (OUTPATIENT)
Dept: OBGYN | Facility: OTHER | Age: 38
End: 2018-02-20
Attending: OBSTETRICS & GYNECOLOGY
Payer: COMMERCIAL

## 2018-02-20 VITALS — DIASTOLIC BLOOD PRESSURE: 68 MMHG | WEIGHT: 186 LBS | SYSTOLIC BLOOD PRESSURE: 104 MMHG | BODY MASS INDEX: 30.95 KG/M2

## 2018-02-20 DIAGNOSIS — O09.899 SUPERVISION OF OTHER HIGH RISK PREGNANCY, ANTEPARTUM: ICD-10-CM

## 2018-02-20 DIAGNOSIS — O09.521 ELDERLY MULTIGRAVIDA IN FIRST TRIMESTER: Primary | ICD-10-CM

## 2018-02-20 PROCEDURE — 99207 ZZC PRENATAL VISIT: CPT | Performed by: OBSTETRICS & GYNECOLOGY

## 2018-02-20 PROCEDURE — G0463 HOSPITAL OUTPT CLINIC VISIT: HCPCS

## 2018-02-20 NOTE — PATIENT INSTRUCTIONS
Return to clinic in 1 week.  If you think your bag of water is broken; have bleeding like a period; think you are in labor; or are worried about your baby's movement, please call the labor and delivery unit at [phone].

## 2018-02-20 NOTE — MR AVS SNAPSHOT
After Visit Summary   2/20/2018    Joaquina Perez    MRN: [id number]           Patient Information     Date Of Birth          1980        Visit Information        Provider Department      2/20/2018 3:20 PM Geetha Jacobs MD [facility]        Today's Diagnoses     Elderly multigravida in first trimester    -  1    Supervision of other high risk pregnancy, antepartum          Care Instructions    Return to clinic in 1 week.  If you think your bag of water is broken; have bleeding like a period; think you are in labor; or are worried about your baby's movement, please call the labor and delivery unit at [phone].            Follow-ups after your visit        Your next 10 appointments already scheduled     Feb 27, 2018  3:50 PM CST   (Arrive by 3:35 PM)   [facility] [place] [facility]    [address]  [place] [address]   [phone]            Mar 05, 2018 10:30 AM CST   (Arrive by 10:15 AM)   Pre-Op physical with [facility] [place] [facility]    [address]  [place] [address]   [phone]            Mar 06, 2018   Procedure with Geetha Jacobs MD   [facility] ([facility] )    [address] [place] [address]  [place] [address] [phone]            Mar 12, 2018  1:30 PM CDT   (Arrive by 1:15 PM)   Post Op with Geetha Jacobs MD   [facility] ([facility] )    [address]  [place] [address]   [phone]              Who to contact     If you have questions or need follow up information about today's clinic visit or your schedule please contact [facility] directly at [phone].  Normal or non-critical lab and imaging results will be communicated to you by MyChart, letter or phone within 4 business days after the clinic has received the results. If you do not  hear from us within 7 days, please contact the clinic through SCOUPY or phone. If you have a critical or abnormal lab result, we will notify you by phone as soon as possible.  Submit refill requests through SCOUPY or call your pharmacy and they will forward the refill request to us. Please allow 3 business days for your refill to be completed.          Additional Information About Your Visit        A-Vu Mediahart Information     SCOUPY gives you secure access to your electronic health record. If you see a primary care provider, you can also send messages to your care team and make appointments. If you have questions, please call your primary care clinic.  If you do not have a primary care provider, please call [phone] and they will assist you.        Care EveryWhere ID     This is your Care EveryWhere ID. This could be used by other organizations to access your [place] medical records  [id number]        Your Vitals Were     Last Period BMI (Body Mass Index)                06/12/2017 (Exact Date) 30.95 kg/m2           Blood Pressure from Last 3 Encounters:   02/20/18 104/68   02/13/18 98/62   02/06/18 92/64    Weight from Last 3 Encounters:   02/20/18 186 lb (84.4 kg)   02/13/18 186 lb (84.4 kg)   02/06/18 184 lb (83.5 kg)              Today, you had the following     No orders found for display       Primary Care Provider Office Phone # Fax #    Seymour Alcantar [phone]       [facility] [place] [address]        Equal Access to Services     [facility] ANISH : Hadii erika ku hadasho Soomaali, waaxda luqadaha, qaybta kaalmada marilynegjoslyn, sherice gutierrez . So [facility] [phone].    ATENCIÓN: Si habla jesañol, tiene a moody disposición servicios gratuitos de asistencia lingüística. Llame al [phone].    We comply with applicable federal civil rights laws and [place] laws. We do not discriminate on the basis of race, color, national origin, age, disability, sex, sexual  orientation, or gender identity.            Thank you!     Thank you for choosing [facility]  for your care. Our goal is always to provide you with excellent care. Hearing back from our patients is one way we can continue to improve our services. Please take a few minutes to complete the written survey that you may receive in the mail after your visit with us. Thank you!             Your Updated Medication List - Protect others around you: Learn how to safely use, store and throw away your medicines at www.disposemymeds.org.          This list is accurate as of 2/20/18  4:44 PM.  Always use your most recent med list.                   Brand Name Dispense Instructions for use Diagnosis    PEPCID AC MAXIMUM STRENGTH PO      Take 10 mg by mouth        prenatal multivitamin plus iron 27-0.8 MG Tabs per tablet      Take 1 tablet by mouth daily    Family planning, Depression with anxiety       TYLENOL PO      Take 500 mg by mouth

## 2018-02-27 ENCOUNTER — PRENATAL OFFICE VISIT (OUTPATIENT)
Dept: OBGYN | Facility: OTHER | Age: 38
End: 2018-02-27
Attending: OBSTETRICS & GYNECOLOGY
Payer: COMMERCIAL

## 2018-02-27 VITALS — SYSTOLIC BLOOD PRESSURE: 100 MMHG | DIASTOLIC BLOOD PRESSURE: 68 MMHG | BODY MASS INDEX: 31.45 KG/M2 | WEIGHT: 189 LBS

## 2018-02-27 DIAGNOSIS — O09.899 SUPERVISION OF OTHER HIGH RISK PREGNANCY, ANTEPARTUM: ICD-10-CM

## 2018-02-27 DIAGNOSIS — O09.521 ELDERLY MULTIGRAVIDA IN FIRST TRIMESTER: Primary | ICD-10-CM

## 2018-02-27 DIAGNOSIS — Z98.891 HISTORY OF CESAREAN SECTION: ICD-10-CM

## 2018-02-27 PROCEDURE — 99207 ZZC PRENATAL VISIT: CPT | Performed by: OBSTETRICS & GYNECOLOGY

## 2018-02-27 PROCEDURE — G0463 HOSPITAL OUTPT CLINIC VISIT: HCPCS

## 2018-02-27 NOTE — PATIENT INSTRUCTIONS
Return to clinic in 1 week.  Sign Tubal Sterilization consent.  If you think your bag of water is broken; have bleeding like a period; think you are in labor; or are worried about your baby's movement, please call the labor and delivery unit at [phone].

## 2018-02-27 NOTE — PROGRESS NOTES
rto Monday  Sign bto papers  The desire for permanent sterilization was expressed by patient at the beginning of the pregnancy and I did not remember to have her sign papers.

## 2018-02-27 NOTE — MR AVS SNAPSHOT
After Visit Summary   2018    Joaquina Perez    MRN: [id number]           Patient Information     Date Of Birth          1980        Visit Information        Provider Department      2018 3:50 PM Geetha Jacobs MD [facility]        Today's Diagnoses     Elderly multigravida in first trimester    -  1    Supervision of other high risk pregnancy, antepartum        History of  section          Care Instructions    Return to clinic in 1 week.  Sign Tubal Sterilization consent.  If you think your bag of water is broken; have bleeding like a period; think you are in labor; or are worried about your baby's movement, please call the labor and delivery unit at [phone].            Follow-ups after your visit        Your next 10 appointments already scheduled     Mar 05, 2018 10:30 AM CST   (Arrive by 10:15 AM)   Pre-Op physical with Geetha Jacobs MD   [facility] ([facility] )    [address] [facility] [address]   [phone]            Mar 06, 2018   Procedure with Geetha Jacobs MD   [facility] ([facility] )    [address] [place] [id number] [phone]            Mar 12, 2018  1:30 PM CDT   (Arrive by 1:15 PM)   Post Op with Geetha Jacobs MD   [facility]    [address] [facility] [address] [facility] [address]   [phone]              Who to contact     If you have questions or need follow up information about today's clinic visit or your schedule please contact [facility] directly at [phone].  Normal or non-critical lab and imaging results will be communicated to you by MyChart, letter or phone within 4 business days after the clinic has received the results. If you do not hear from us within 7 days, please contact the clinic through MyChart or phone. If you have a critical or abnormal lab result, we will notify you by phone  as soon as possible.  Submit refill requests through Abazab or call your pharmacy and they will forward the refill request to us. Please allow 3 business days for your refill to be completed.          Additional Information About Your Visit        JETMEhart Information     Abazab gives you secure access to your electronic health record. If you see a primary care provider, you can also send messages to your care team and make appointments. If you have questions, please call your primary care clinic.  If you do not have a primary care provider, please call [phone] and they will assist you.        Care EveryWhere ID     This is your Care EveryWhere ID. This could be used by other organizations to access your [place] medical records  [id number]        Your Vitals Were     Last Period BMI (Body Mass Index)                06/12/2017 (Exact Date) 31.45 kg/m2           Blood Pressure from Last 3 Encounters:   02/27/18 100/68   02/20/18 104/68   02/13/18 98/62    Weight from Last 3 Encounters:   02/27/18 189 lb (85.7 kg)   02/20/18 186 lb (84.4 kg)   02/13/18 186 lb (84.4 kg)              Today, you had the following     No orders found for display       Primary Care Provider Office Phone # Fax #    Seymour Alcantar [phone]       [facility] [place] [id number]        Equal Access to Services     [facility]: Hadii erika rodriguez hadasho Soomaali, waaxda luqadaha, qaybta kaalmada adeegrolandoda, sherice stephen. So [place] -[phone].    ATENCIÓN: Si habla español, tiene a moody disposición servicios gratuitos de asistencia lingüística. Llame al [phone].    We comply with applicable federal civil rights laws and [place] laws. We do not discriminate on the basis of race, color, national origin, age, disability, sex, sexual orientation, or gender identity.            Thank you!     Thank you for choosing [facility]  for your care. Our goal is always to provide  you with excellent care. Hearing back from our patients is one way we can continue to improve our services. Please take a few minutes to complete the written survey that you may receive in the mail after your visit with us. Thank you!             Your Updated Medication List - Protect others around you: Learn how to safely use, store and throw away your medicines at www.disposemymeds.org.          This list is accurate as of 2/27/18  4:22 PM.  Always use your most recent med list.                   Brand Name Dispense Instructions for use Diagnosis    PEPCID AC MAXIMUM STRENGTH PO      Take 10 mg by mouth        prenatal multivitamin plus iron 27-0.8 MG Tabs per tablet      Take 1 tablet by mouth daily    Family planning, Depression with anxiety       TYLENOL PO      Take 500 mg by mouth

## 2018-03-02 ENCOUNTER — SURGERY (OUTPATIENT)
Age: 38
End: 2018-03-02

## 2018-03-02 ENCOUNTER — HOSPITAL ENCOUNTER (INPATIENT)
Facility: HOSPITAL | Age: 38
LOS: 3 days | Discharge: HOME | End: 2018-03-05
Attending: OBSTETRICS & GYNECOLOGY | Admitting: OBSTETRICS & GYNECOLOGY
Payer: COMMERCIAL

## 2018-03-02 ENCOUNTER — ANESTHESIA (OUTPATIENT)
Dept: SURGERY | Facility: HOSPITAL | Age: 38
End: 2018-03-02
Payer: COMMERCIAL

## 2018-03-02 ENCOUNTER — ANESTHESIA EVENT (OUTPATIENT)
Dept: SURGERY | Facility: HOSPITAL | Age: 38
End: 2018-03-02
Payer: COMMERCIAL

## 2018-03-02 ENCOUNTER — TELEPHONE (OUTPATIENT)
Dept: OBGYN | Facility: HOSPITAL | Age: 38
End: 2018-03-02

## 2018-03-02 PROBLEM — Z36.89 ENCOUNTER FOR TRIAGE IN PREGNANT PATIENT: Status: ACTIVE | Noted: 2018-03-02

## 2018-03-02 LAB
A1 MICROGLOB PLACENTAL VAG QL: POSITIVE
ABO + RH BLD: NORMAL
ABO + RH BLD: NORMAL
ALBUMIN UR-MCNC: 30 MG/DL
APPEARANCE UR: ABNORMAL
APTT PPP: 22 SEC (ref 24–37)
BACTERIA #/AREA URNS HPF: ABNORMAL /HPF
BILIRUB UR QL STRIP: NEGATIVE
BLD GP AB SCN SERPL QL: NORMAL
BLOOD BANK CMNT PATIENT-IMP: NORMAL
COLOR UR AUTO: YELLOW
CREAT SERPL-MCNC: 0.5 MG/DL (ref 0.52–1.04)
ERYTHROCYTE [DISTWIDTH] IN BLOOD BY AUTOMATED COUNT: 13.2 % (ref 10–15)
GFR SERPL CREATININE-BSD FRML MDRD: >90 ML/MIN/1.7M2
GLUCOSE UR STRIP-MCNC: NEGATIVE MG/DL
HCT VFR BLD AUTO: 34.9 % (ref 35–47)
HGB BLD-MCNC: 11.9 G/DL (ref 11.7–15.7)
HGB BLD-MCNC: NORMAL G/DL (ref 11.7–15.7)
HGB UR QL STRIP: NEGATIVE
INR PPP: 0.9 (ref 0.8–1.2)
KETONES UR STRIP-MCNC: NEGATIVE MG/DL
LEUKOCYTE ESTERASE UR QL STRIP: ABNORMAL
MCH RBC QN AUTO: 32.2 PG (ref 26.5–33)
MCHC RBC AUTO-ENTMCNC: 34.1 G/DL (ref 31.5–36.5)
MCV RBC AUTO: 94 FL (ref 78–100)
MUCOUS THREADS #/AREA URNS LPF: PRESENT /LPF
NITRATE UR QL: NEGATIVE
PH UR STRIP: 6.5 PH (ref 4.7–8)
PLATELET # BLD AUTO: 136 10E9/L (ref 150–450)
PLATELET # BLD AUTO: 150 10E9/L (ref 150–450)
RBC # BLD AUTO: 3.7 10E12/L (ref 3.8–5.2)
RBC #/AREA URNS AUTO: 5 /HPF (ref 0–2)
SOURCE: ABNORMAL
SP GR UR STRIP: 1.02 (ref 1–1.03)
SPECIMEN EXP DATE BLD: NORMAL
SQUAMOUS #/AREA URNS AUTO: 22 /HPF (ref 0–1)
UROBILINOGEN UR STRIP-MCNC: NORMAL MG/DL (ref 0–2)
WBC # BLD AUTO: 8.5 10E9/L (ref 4–11)
WBC #/AREA URNS AUTO: 34 /HPF (ref 0–5)

## 2018-03-02 PROCEDURE — 84112 EVAL AMNIOTIC FLUID PROTEIN: CPT | Performed by: OBSTETRICS & GYNECOLOGY

## 2018-03-02 PROCEDURE — 25000128 H RX IP 250 OP 636: Performed by: ANESTHESIOLOGY

## 2018-03-02 PROCEDURE — 86901 BLOOD TYPING SEROLOGIC RH(D): CPT | Performed by: OBSTETRICS & GYNECOLOGY

## 2018-03-02 PROCEDURE — 25000125 ZZHC RX 250: Performed by: NURSE ANESTHETIST, CERTIFIED REGISTERED

## 2018-03-02 PROCEDURE — 85730 THROMBOPLASTIN TIME PARTIAL: CPT | Performed by: OBSTETRICS & GYNECOLOGY

## 2018-03-02 PROCEDURE — 25000128 H RX IP 250 OP 636: Performed by: NURSE ANESTHETIST, CERTIFIED REGISTERED

## 2018-03-02 PROCEDURE — 85610 PROTHROMBIN TIME: CPT | Performed by: OBSTETRICS & GYNECOLOGY

## 2018-03-02 PROCEDURE — 40000305 ZZH STATISTIC PRE PROC ASSESS I: Performed by: OBSTETRICS & GYNECOLOGY

## 2018-03-02 PROCEDURE — 12000031 ZZH R&B OB CRITICAL

## 2018-03-02 PROCEDURE — 25000128 H RX IP 250 OP 636: Performed by: OBSTETRICS & GYNECOLOGY

## 2018-03-02 PROCEDURE — 3E0T3BZ INTRODUCTION OF ANESTHETIC AGENT INTO PERIPHERAL NERVES AND PLEXI, PERCUTANEOUS APPROACH: ICD-10-PCS | Performed by: OBSTETRICS & GYNECOLOGY

## 2018-03-02 PROCEDURE — 71000014 ZZH RECOVERY PHASE 1 LEVEL 2 FIRST HR: Performed by: OBSTETRICS & GYNECOLOGY

## 2018-03-02 PROCEDURE — 36000056 ZZH SURGERY LEVEL 3 1ST 30 MIN: Performed by: OBSTETRICS & GYNECOLOGY

## 2018-03-02 PROCEDURE — 01999 UNLISTED ANES PROCEDURE: CPT | Performed by: NURSE ANESTHETIST, CERTIFIED REGISTERED

## 2018-03-02 PROCEDURE — 37000009 ZZH ANESTHESIA TECHNICAL FEE, EACH ADDTL 15 MIN: Performed by: OBSTETRICS & GYNECOLOGY

## 2018-03-02 PROCEDURE — 37000011 ZZH ANESTHESIA WARD SERVICE: Performed by: NURSE ANESTHETIST, CERTIFIED REGISTERED

## 2018-03-02 PROCEDURE — 37000008 ZZH ANESTHESIA TECHNICAL FEE, 1ST 30 MIN: Performed by: OBSTETRICS & GYNECOLOGY

## 2018-03-02 PROCEDURE — 36000058 ZZH SURGERY LEVEL 3 EA 15 ADDTL MIN: Performed by: OBSTETRICS & GYNECOLOGY

## 2018-03-02 PROCEDURE — 86900 BLOOD TYPING SEROLOGIC ABO: CPT | Performed by: OBSTETRICS & GYNECOLOGY

## 2018-03-02 PROCEDURE — 36415 COLL VENOUS BLD VENIPUNCTURE: CPT | Performed by: OBSTETRICS & GYNECOLOGY

## 2018-03-02 PROCEDURE — 59025 FETAL NON-STRESS TEST: CPT

## 2018-03-02 PROCEDURE — 25000125 ZZHC RX 250: Performed by: OBSTETRICS & GYNECOLOGY

## 2018-03-02 PROCEDURE — 82565 ASSAY OF CREATININE: CPT | Performed by: OBSTETRICS & GYNECOLOGY

## 2018-03-02 PROCEDURE — 86850 RBC ANTIBODY SCREEN: CPT | Performed by: OBSTETRICS & GYNECOLOGY

## 2018-03-02 PROCEDURE — 27210794 ZZH OR GENERAL SUPPLY STERILE: Performed by: OBSTETRICS & GYNECOLOGY

## 2018-03-02 PROCEDURE — 85027 COMPLETE CBC AUTOMATED: CPT | Performed by: OBSTETRICS & GYNECOLOGY

## 2018-03-02 PROCEDURE — 85049 AUTOMATED PLATELET COUNT: CPT | Performed by: OBSTETRICS & GYNECOLOGY

## 2018-03-02 PROCEDURE — 4A1HXCZ MONITORING OF PRODUCTS OF CONCEPTION, CARDIAC RATE, EXTERNAL APPROACH: ICD-10-PCS | Performed by: OBSTETRICS & GYNECOLOGY

## 2018-03-02 PROCEDURE — 59510 CESAREAN DELIVERY: CPT | Performed by: OBSTETRICS & GYNECOLOGY

## 2018-03-02 PROCEDURE — 27110028 ZZH OR GENERAL SUPPLY NON-STERILE: Performed by: OBSTETRICS & GYNECOLOGY

## 2018-03-02 PROCEDURE — 81001 URINALYSIS AUTO W/SCOPE: CPT | Performed by: OBSTETRICS & GYNECOLOGY

## 2018-03-02 PROCEDURE — G0463 HOSPITAL OUTPT CLINIC VISIT: HCPCS | Mod: 25

## 2018-03-02 PROCEDURE — 25000132 ZZH RX MED GY IP 250 OP 250 PS 637: Performed by: OBSTETRICS & GYNECOLOGY

## 2018-03-02 PROCEDURE — 25000132 ZZH RX MED GY IP 250 OP 250 PS 637: Performed by: ANESTHESIOLOGY

## 2018-03-02 PROCEDURE — 59514 CESAREAN DELIVERY ONLY: CPT | Performed by: ANESTHESIOLOGY

## 2018-03-02 RX ORDER — KETOROLAC TROMETHAMINE 15 MG/ML
15 INJECTION, SOLUTION INTRAMUSCULAR; INTRAVENOUS EVERY 6 HOURS
Status: DISCONTINUED | OUTPATIENT
Start: 2018-03-02 | End: 2018-03-03

## 2018-03-02 RX ORDER — KETOROLAC TROMETHAMINE 30 MG/ML
30 INJECTION, SOLUTION INTRAMUSCULAR; INTRAVENOUS
Status: DISCONTINUED | OUTPATIENT
Start: 2018-03-02 | End: 2018-03-02 | Stop reason: HOSPADM

## 2018-03-02 RX ORDER — NALOXONE HYDROCHLORIDE 0.4 MG/ML
.1-.4 INJECTION, SOLUTION INTRAMUSCULAR; INTRAVENOUS; SUBCUTANEOUS
Status: ACTIVE | OUTPATIENT
Start: 2018-03-02 | End: 2018-03-03

## 2018-03-02 RX ORDER — OXYTOCIN/0.9 % SODIUM CHLORIDE 30/500 ML
100 PLASTIC BAG, INJECTION (ML) INTRAVENOUS CONTINUOUS
Status: DISCONTINUED | OUTPATIENT
Start: 2018-03-02 | End: 2018-03-05 | Stop reason: HOSPADM

## 2018-03-02 RX ORDER — LABETALOL HYDROCHLORIDE 5 MG/ML
10 INJECTION, SOLUTION INTRAVENOUS
Status: DISCONTINUED | OUTPATIENT
Start: 2018-03-02 | End: 2018-03-02 | Stop reason: HOSPADM

## 2018-03-02 RX ORDER — DEXAMETHASONE SODIUM PHOSPHATE 10 MG/ML
INJECTION, SOLUTION INTRAMUSCULAR; INTRAVENOUS PRN
Status: DISCONTINUED | OUTPATIENT
Start: 2018-03-02 | End: 2018-03-02

## 2018-03-02 RX ORDER — SODIUM CHLORIDE, SODIUM LACTATE, POTASSIUM CHLORIDE, CALCIUM CHLORIDE 600; 310; 30; 20 MG/100ML; MG/100ML; MG/100ML; MG/100ML
INJECTION, SOLUTION INTRAVENOUS CONTINUOUS
Status: DISCONTINUED | OUTPATIENT
Start: 2018-03-02 | End: 2018-03-02 | Stop reason: HOSPADM

## 2018-03-02 RX ORDER — DIPHENHYDRAMINE HYDROCHLORIDE 50 MG/ML
25 INJECTION INTRAMUSCULAR; INTRAVENOUS EVERY 6 HOURS PRN
Status: CANCELLED | OUTPATIENT
Start: 2018-03-02

## 2018-03-02 RX ORDER — OXYTOCIN/0.9 % SODIUM CHLORIDE 30/500 ML
340 PLASTIC BAG, INJECTION (ML) INTRAVENOUS CONTINUOUS PRN
Status: DISCONTINUED | OUTPATIENT
Start: 2018-03-02 | End: 2018-03-05 | Stop reason: HOSPADM

## 2018-03-02 RX ORDER — OXYTOCIN/0.9 % SODIUM CHLORIDE 30/500 ML
100 PLASTIC BAG, INJECTION (ML) INTRAVENOUS CONTINUOUS
Status: CANCELLED | OUTPATIENT
Start: 2018-03-02

## 2018-03-02 RX ORDER — CEFAZOLIN SODIUM 2 G/100ML
2 INJECTION, SOLUTION INTRAVENOUS
Status: COMPLETED | OUTPATIENT
Start: 2018-03-02 | End: 2018-03-02

## 2018-03-02 RX ORDER — HYDROCORTISONE 2.5 %
CREAM (GRAM) TOPICAL 3 TIMES DAILY PRN
Status: CANCELLED | OUTPATIENT
Start: 2018-03-02

## 2018-03-02 RX ORDER — HYDRALAZINE HYDROCHLORIDE 20 MG/ML
2.5-5 INJECTION INTRAMUSCULAR; INTRAVENOUS EVERY 10 MIN PRN
Status: DISCONTINUED | OUTPATIENT
Start: 2018-03-02 | End: 2018-03-02 | Stop reason: HOSPADM

## 2018-03-02 RX ORDER — OXYTOCIN 10 [USP'U]/ML
10 INJECTION, SOLUTION INTRAMUSCULAR; INTRAVENOUS
Status: DISCONTINUED | OUTPATIENT
Start: 2018-03-02 | End: 2018-03-05 | Stop reason: HOSPADM

## 2018-03-02 RX ORDER — LIDOCAINE 40 MG/G
CREAM TOPICAL
Status: CANCELLED | OUTPATIENT
Start: 2018-03-02

## 2018-03-02 RX ORDER — OXYCODONE HYDROCHLORIDE 5 MG/1
5-15 TABLET ORAL EVERY 4 HOURS PRN
Status: DISCONTINUED | OUTPATIENT
Start: 2018-03-02 | End: 2018-03-05 | Stop reason: HOSPADM

## 2018-03-02 RX ORDER — SIMETHICONE 80 MG
80 TABLET,CHEWABLE ORAL 4 TIMES DAILY PRN
Status: DISCONTINUED | OUTPATIENT
Start: 2018-03-02 | End: 2018-03-05 | Stop reason: HOSPADM

## 2018-03-02 RX ORDER — SODIUM CHLORIDE 9 MG/ML
INJECTION, SOLUTION INTRAVENOUS CONTINUOUS PRN
Status: DISCONTINUED | OUTPATIENT
Start: 2018-03-02 | End: 2018-03-02

## 2018-03-02 RX ORDER — NALOXONE HYDROCHLORIDE 0.4 MG/ML
.1-.4 INJECTION, SOLUTION INTRAMUSCULAR; INTRAVENOUS; SUBCUTANEOUS
Status: CANCELLED | OUTPATIENT
Start: 2018-03-02

## 2018-03-02 RX ORDER — DIPHENHYDRAMINE HYDROCHLORIDE 50 MG/ML
25 INJECTION INTRAMUSCULAR; INTRAVENOUS EVERY 6 HOURS PRN
Status: DISCONTINUED | OUTPATIENT
Start: 2018-03-02 | End: 2018-03-05 | Stop reason: HOSPADM

## 2018-03-02 RX ORDER — BISACODYL 10 MG
10 SUPPOSITORY, RECTAL RECTAL DAILY PRN
Status: CANCELLED | OUTPATIENT
Start: 2018-03-04

## 2018-03-02 RX ORDER — HYDROMORPHONE HYDROCHLORIDE 1 MG/ML
.3-.5 INJECTION, SOLUTION INTRAMUSCULAR; INTRAVENOUS; SUBCUTANEOUS EVERY 30 MIN PRN
Status: CANCELLED | OUTPATIENT
Start: 2018-03-02

## 2018-03-02 RX ORDER — BACITRACIN ZINC 500 [USP'U]/G
OINTMENT TOPICAL PRN
Status: DISCONTINUED | OUTPATIENT
Start: 2018-03-02 | End: 2018-03-02 | Stop reason: HOSPADM

## 2018-03-02 RX ORDER — KETOROLAC TROMETHAMINE 30 MG/ML
INJECTION, SOLUTION INTRAMUSCULAR; INTRAVENOUS PRN
Status: DISCONTINUED | OUTPATIENT
Start: 2018-03-02 | End: 2018-03-02

## 2018-03-02 RX ORDER — HYDROMORPHONE HYDROCHLORIDE 1 MG/ML
.3-.5 INJECTION, SOLUTION INTRAMUSCULAR; INTRAVENOUS; SUBCUTANEOUS EVERY 5 MIN PRN
Status: DISCONTINUED | OUTPATIENT
Start: 2018-03-02 | End: 2018-03-02 | Stop reason: HOSPADM

## 2018-03-02 RX ORDER — NALOXONE HYDROCHLORIDE 0.4 MG/ML
.1-.4 INJECTION, SOLUTION INTRAMUSCULAR; INTRAVENOUS; SUBCUTANEOUS
Status: DISCONTINUED | OUTPATIENT
Start: 2018-03-02 | End: 2018-03-05 | Stop reason: HOSPADM

## 2018-03-02 RX ORDER — AMOXICILLIN 250 MG
1 CAPSULE ORAL 2 TIMES DAILY PRN
Status: DISCONTINUED | OUTPATIENT
Start: 2018-03-02 | End: 2018-03-05 | Stop reason: HOSPADM

## 2018-03-02 RX ORDER — DEXAMETHASONE SODIUM PHOSPHATE 4 MG/ML
4 INJECTION, SOLUTION INTRA-ARTICULAR; INTRALESIONAL; INTRAMUSCULAR; INTRAVENOUS; SOFT TISSUE
Status: DISCONTINUED | OUTPATIENT
Start: 2018-03-02 | End: 2018-03-02 | Stop reason: HOSPADM

## 2018-03-02 RX ORDER — ONDANSETRON 4 MG/1
4 TABLET, ORALLY DISINTEGRATING ORAL EVERY 30 MIN PRN
Status: DISCONTINUED | OUTPATIENT
Start: 2018-03-02 | End: 2018-03-02 | Stop reason: HOSPADM

## 2018-03-02 RX ORDER — BISACODYL 10 MG
10 SUPPOSITORY, RECTAL RECTAL DAILY PRN
Status: DISCONTINUED | OUTPATIENT
Start: 2018-03-04 | End: 2018-03-05 | Stop reason: HOSPADM

## 2018-03-02 RX ORDER — IBUPROFEN 600 MG/1
600 TABLET, FILM COATED ORAL EVERY 6 HOURS PRN
Status: DISCONTINUED | OUTPATIENT
Start: 2018-03-02 | End: 2018-03-05 | Stop reason: HOSPADM

## 2018-03-02 RX ORDER — ROPIVACAINE HYDROCHLORIDE 7.5 MG/ML
INJECTION, SOLUTION EPIDURAL; PERINEURAL PRN
Status: DISCONTINUED | OUTPATIENT
Start: 2018-03-02 | End: 2018-03-02

## 2018-03-02 RX ORDER — SUFENTANIL CITRATE 50 UG/ML
INJECTION EPIDURAL; INTRAVENOUS PRN
Status: DISCONTINUED | OUTPATIENT
Start: 2018-03-02 | End: 2018-03-02

## 2018-03-02 RX ORDER — DIPHENHYDRAMINE HCL 25 MG
25 CAPSULE ORAL EVERY 6 HOURS PRN
Status: DISCONTINUED | OUTPATIENT
Start: 2018-03-02 | End: 2018-03-05 | Stop reason: HOSPADM

## 2018-03-02 RX ORDER — LANOLIN 100 %
OINTMENT (GRAM) TOPICAL
Status: DISCONTINUED | OUTPATIENT
Start: 2018-03-02 | End: 2018-03-05 | Stop reason: HOSPADM

## 2018-03-02 RX ORDER — IBUPROFEN 800 MG/1
800 TABLET, FILM COATED ORAL EVERY 6 HOURS PRN
Status: DISCONTINUED | OUTPATIENT
Start: 2018-03-02 | End: 2018-03-05 | Stop reason: HOSPADM

## 2018-03-02 RX ORDER — MISOPROSTOL 200 UG/1
400 TABLET ORAL
Status: CANCELLED | OUTPATIENT
Start: 2018-03-02

## 2018-03-02 RX ORDER — HYDROCORTISONE 2.5 %
CREAM (GRAM) TOPICAL 3 TIMES DAILY PRN
Status: DISCONTINUED | OUTPATIENT
Start: 2018-03-02 | End: 2018-03-05 | Stop reason: HOSPADM

## 2018-03-02 RX ORDER — LANOLIN 100 %
OINTMENT (GRAM) TOPICAL
Status: CANCELLED | OUTPATIENT
Start: 2018-03-02

## 2018-03-02 RX ORDER — MEPERIDINE HYDROCHLORIDE 25 MG/ML
12.5 INJECTION INTRAMUSCULAR; INTRAVENOUS; SUBCUTANEOUS EVERY 5 MIN PRN
Status: DISCONTINUED | OUTPATIENT
Start: 2018-03-02 | End: 2018-03-02 | Stop reason: HOSPADM

## 2018-03-02 RX ORDER — IBUPROFEN 600 MG/1
600 TABLET, FILM COATED ORAL EVERY 6 HOURS PRN
Status: CANCELLED | OUTPATIENT
Start: 2018-03-02

## 2018-03-02 RX ORDER — IBUPROFEN 400 MG/1
400 TABLET, FILM COATED ORAL EVERY 6 HOURS PRN
Status: CANCELLED | OUTPATIENT
Start: 2018-03-02

## 2018-03-02 RX ORDER — DEXTROSE, SODIUM CHLORIDE, SODIUM LACTATE, POTASSIUM CHLORIDE, AND CALCIUM CHLORIDE 5; .6; .31; .03; .02 G/100ML; G/100ML; G/100ML; G/100ML; G/100ML
INJECTION, SOLUTION INTRAVENOUS CONTINUOUS
Status: CANCELLED | OUTPATIENT
Start: 2018-03-02

## 2018-03-02 RX ORDER — HYDROMORPHONE HYDROCHLORIDE 1 MG/ML
.3-.5 INJECTION, SOLUTION INTRAMUSCULAR; INTRAVENOUS; SUBCUTANEOUS EVERY 30 MIN PRN
Status: DISCONTINUED | OUTPATIENT
Start: 2018-03-02 | End: 2018-03-05 | Stop reason: HOSPADM

## 2018-03-02 RX ORDER — AMOXICILLIN 250 MG
2 CAPSULE ORAL 2 TIMES DAILY PRN
Status: DISCONTINUED | OUTPATIENT
Start: 2018-03-02 | End: 2018-03-05 | Stop reason: HOSPADM

## 2018-03-02 RX ORDER — BUPIVACAINE HYDROCHLORIDE 7.5 MG/ML
INJECTION, SOLUTION INTRASPINAL PRN
Status: DISCONTINUED | OUTPATIENT
Start: 2018-03-02 | End: 2018-03-02

## 2018-03-02 RX ORDER — AMOXICILLIN 250 MG
2 CAPSULE ORAL 2 TIMES DAILY PRN
Status: CANCELLED | OUTPATIENT
Start: 2018-03-02

## 2018-03-02 RX ORDER — OXYTOCIN 10 [USP'U]/ML
10 INJECTION, SOLUTION INTRAMUSCULAR; INTRAVENOUS
Status: CANCELLED | OUTPATIENT
Start: 2018-03-02

## 2018-03-02 RX ORDER — AMOXICILLIN 250 MG
1 CAPSULE ORAL 2 TIMES DAILY PRN
Status: CANCELLED | OUTPATIENT
Start: 2018-03-02

## 2018-03-02 RX ORDER — ONDANSETRON 2 MG/ML
INJECTION INTRAMUSCULAR; INTRAVENOUS PRN
Status: DISCONTINUED | OUTPATIENT
Start: 2018-03-02 | End: 2018-03-02

## 2018-03-02 RX ORDER — MISOPROSTOL 200 UG/1
400 TABLET ORAL
Status: DISCONTINUED | OUTPATIENT
Start: 2018-03-02 | End: 2018-03-05 | Stop reason: HOSPADM

## 2018-03-02 RX ORDER — ONDANSETRON 2 MG/ML
4 INJECTION INTRAMUSCULAR; INTRAVENOUS EVERY 6 HOURS PRN
Status: DISCONTINUED | OUTPATIENT
Start: 2018-03-02 | End: 2018-03-05 | Stop reason: HOSPADM

## 2018-03-02 RX ORDER — CITRIC ACID/SODIUM CITRATE 334-500MG
30 SOLUTION, ORAL ORAL
Status: COMPLETED | OUTPATIENT
Start: 2018-03-02 | End: 2018-03-02

## 2018-03-02 RX ORDER — OXYTOCIN/0.9 % SODIUM CHLORIDE 30/500 ML
PLASTIC BAG, INJECTION (ML) INTRAVENOUS PRN
Status: DISCONTINUED | OUTPATIENT
Start: 2018-03-02 | End: 2018-03-02

## 2018-03-02 RX ORDER — ONDANSETRON 2 MG/ML
4 INJECTION INTRAMUSCULAR; INTRAVENOUS EVERY 30 MIN PRN
Status: DISCONTINUED | OUTPATIENT
Start: 2018-03-02 | End: 2018-03-02 | Stop reason: HOSPADM

## 2018-03-02 RX ORDER — IBUPROFEN 800 MG/1
800 TABLET, FILM COATED ORAL EVERY 6 HOURS PRN
Status: CANCELLED | OUTPATIENT
Start: 2018-03-02

## 2018-03-02 RX ORDER — OXYCODONE HYDROCHLORIDE 5 MG/1
5 TABLET ORAL EVERY 4 HOURS PRN
Status: DISCONTINUED | OUTPATIENT
Start: 2018-03-02 | End: 2018-03-02

## 2018-03-02 RX ORDER — DIPHENHYDRAMINE HCL 25 MG
25 CAPSULE ORAL EVERY 6 HOURS PRN
Status: CANCELLED | OUTPATIENT
Start: 2018-03-02

## 2018-03-02 RX ORDER — DEXTROSE, SODIUM CHLORIDE, SODIUM LACTATE, POTASSIUM CHLORIDE, AND CALCIUM CHLORIDE 5; .6; .31; .03; .02 G/100ML; G/100ML; G/100ML; G/100ML; G/100ML
INJECTION, SOLUTION INTRAVENOUS CONTINUOUS
Status: DISCONTINUED | OUTPATIENT
Start: 2018-03-02 | End: 2018-03-05 | Stop reason: HOSPADM

## 2018-03-02 RX ORDER — OXYTOCIN/0.9 % SODIUM CHLORIDE 30/500 ML
340 PLASTIC BAG, INJECTION (ML) INTRAVENOUS CONTINUOUS PRN
Status: CANCELLED | OUTPATIENT
Start: 2018-03-02

## 2018-03-02 RX ORDER — EPHEDRINE SULFATE 50 MG/ML
INJECTION, SOLUTION INTRAVENOUS PRN
Status: DISCONTINUED | OUTPATIENT
Start: 2018-03-02 | End: 2018-03-02

## 2018-03-02 RX ORDER — NALOXONE HYDROCHLORIDE 0.4 MG/ML
.1-.4 INJECTION, SOLUTION INTRAMUSCULAR; INTRAVENOUS; SUBCUTANEOUS
Status: DISCONTINUED | OUTPATIENT
Start: 2018-03-02 | End: 2018-03-02

## 2018-03-02 RX ORDER — ALBUTEROL SULFATE 0.83 MG/ML
2.5 SOLUTION RESPIRATORY (INHALATION) EVERY 4 HOURS PRN
Status: DISCONTINUED | OUTPATIENT
Start: 2018-03-02 | End: 2018-03-02 | Stop reason: HOSPADM

## 2018-03-02 RX ORDER — SIMETHICONE 80 MG
80 TABLET,CHEWABLE ORAL 4 TIMES DAILY PRN
Status: CANCELLED | OUTPATIENT
Start: 2018-03-02

## 2018-03-02 RX ORDER — CITRIC ACID/SODIUM CITRATE 334-500MG
SOLUTION, ORAL ORAL
Status: DISCONTINUED
Start: 2018-03-02 | End: 2018-03-02 | Stop reason: HOSPADM

## 2018-03-02 RX ORDER — IBUPROFEN 400 MG/1
400 TABLET, FILM COATED ORAL EVERY 6 HOURS PRN
Status: DISCONTINUED | OUTPATIENT
Start: 2018-03-02 | End: 2018-03-05 | Stop reason: HOSPADM

## 2018-03-02 RX ORDER — LIDOCAINE 40 MG/G
CREAM TOPICAL
Status: DISCONTINUED | OUTPATIENT
Start: 2018-03-02 | End: 2018-03-05 | Stop reason: HOSPADM

## 2018-03-02 RX ORDER — ONDANSETRON 2 MG/ML
4 INJECTION INTRAMUSCULAR; INTRAVENOUS EVERY 6 HOURS PRN
Status: CANCELLED | OUTPATIENT
Start: 2018-03-02

## 2018-03-02 RX ORDER — FENTANYL CITRATE 50 UG/ML
25-50 INJECTION, SOLUTION INTRAMUSCULAR; INTRAVENOUS
Status: DISCONTINUED | OUTPATIENT
Start: 2018-03-02 | End: 2018-03-02 | Stop reason: HOSPADM

## 2018-03-02 RX ORDER — KETOROLAC TROMETHAMINE 15 MG/ML
15 INJECTION, SOLUTION INTRAMUSCULAR; INTRAVENOUS EVERY 6 HOURS
Status: CANCELLED | OUTPATIENT
Start: 2018-03-02 | End: 2018-03-03

## 2018-03-02 RX ADMIN — SODIUM CHLORIDE, POTASSIUM CHLORIDE, SODIUM LACTATE AND CALCIUM CHLORIDE: 600; 310; 30; 20 INJECTION, SOLUTION INTRAVENOUS at 11:34

## 2018-03-02 RX ADMIN — ROPIVACAINE HYDROCHLORIDE 16 ML: 7.5 INJECTION, SOLUTION EPIDURAL; PERINEURAL at 13:35

## 2018-03-02 RX ADMIN — CEFAZOLIN SODIUM 2 G: 2 INJECTION, SOLUTION INTRAVENOUS at 12:40

## 2018-03-02 RX ADMIN — PHENYLEPHRINE HYDROCHLORIDE 50 MCG: 10 INJECTION INTRAVENOUS at 13:19

## 2018-03-02 RX ADMIN — OXYTOCIN-SODIUM CHLORIDE 0.9% IV SOLN 30 UNIT/500ML 30 ML: 30-0.9/5 SOLUTION at 13:27

## 2018-03-02 RX ADMIN — ONDANSETRON 4 MG: 2 INJECTION INTRAMUSCULAR; INTRAVENOUS at 12:24

## 2018-03-02 RX ADMIN — EPHEDRINE SULFATE 5 MG: 50 INJECTION, SOLUTION INTRAVENOUS at 12:36

## 2018-03-02 RX ADMIN — PHENYLEPHRINE HYDROCHLORIDE 50 MCG: 10 INJECTION INTRAVENOUS at 13:00

## 2018-03-02 RX ADMIN — SODIUM CHLORIDE, POTASSIUM CHLORIDE, SODIUM LACTATE AND CALCIUM CHLORIDE: 600; 310; 30; 20 INJECTION, SOLUTION INTRAVENOUS at 13:46

## 2018-03-02 RX ADMIN — OXYTOCIN-SODIUM CHLORIDE 0.9% IV SOLN 30 UNIT/500ML 30 ML: 30-0.9/5 SOLUTION at 12:59

## 2018-03-02 RX ADMIN — ROPIVACAINE HYDROCHLORIDE 16 ML: 7.5 INJECTION, SOLUTION EPIDURAL; PERINEURAL at 13:40

## 2018-03-02 RX ADMIN — BUPIVACAINE HYDROCHLORIDE IN DEXTROSE 1.6 ML: 7.5 INJECTION, SOLUTION SUBARACHNOID at 12:32

## 2018-03-02 RX ADMIN — EPHEDRINE SULFATE 5 MG: 50 INJECTION, SOLUTION INTRAVENOUS at 13:28

## 2018-03-02 RX ADMIN — IBUPROFEN 800 MG: 800 TABLET ORAL at 19:32

## 2018-03-02 RX ADMIN — DEXAMETHASONE SODIUM PHOSPHATE 5 MG: 10 INJECTION, SOLUTION INTRAMUSCULAR; INTRAVENOUS at 13:40

## 2018-03-02 RX ADMIN — OXYCODONE HYDROCHLORIDE 5 MG: 5 TABLET ORAL at 18:07

## 2018-03-02 RX ADMIN — SODIUM CHLORIDE, POTASSIUM CHLORIDE, SODIUM LACTATE AND CALCIUM CHLORIDE 1000 ML: 600; 310; 30; 20 INJECTION, SOLUTION INTRAVENOUS at 12:03

## 2018-03-02 RX ADMIN — PHENYLEPHRINE HYDROCHLORIDE 50 MCG: 10 INJECTION INTRAVENOUS at 13:08

## 2018-03-02 RX ADMIN — KETOROLAC TROMETHAMINE 30 MG: 30 INJECTION, SOLUTION INTRAMUSCULAR at 13:43

## 2018-03-02 RX ADMIN — SODIUM CHLORIDE: 9 INJECTION, SOLUTION INTRAVENOUS at 12:59

## 2018-03-02 RX ADMIN — SODIUM CITRATE AND CITRIC ACID MONOHYDRATE 30 ML: 500; 334 SOLUTION ORAL at 12:03

## 2018-03-02 RX ADMIN — EPHEDRINE SULFATE 5 MG: 50 INJECTION, SOLUTION INTRAVENOUS at 12:39

## 2018-03-02 RX ADMIN — PHENYLEPHRINE HYDROCHLORIDE 100 MCG: 10 INJECTION INTRAVENOUS at 13:22

## 2018-03-02 RX ADMIN — DEXAMETHASONE SODIUM PHOSPHATE 6 MG: 10 INJECTION, SOLUTION INTRAMUSCULAR; INTRAVENOUS at 12:59

## 2018-03-02 RX ADMIN — SUFENTANIL CITRATE 5 MCG: 50 INJECTION EPIDURAL; INTRAVENOUS at 12:32

## 2018-03-02 RX ADMIN — SODIUM CHLORIDE, POTASSIUM CHLORIDE, SODIUM LACTATE AND CALCIUM CHLORIDE: 600; 310; 30; 20 INJECTION, SOLUTION INTRAVENOUS at 12:36

## 2018-03-02 RX ADMIN — DEXAMETHASONE SODIUM PHOSPHATE 5 MG: 10 INJECTION, SOLUTION INTRAMUSCULAR; INTRAVENOUS at 13:35

## 2018-03-02 RX ADMIN — OXYCODONE HYDROCHLORIDE 10 MG: 5 TABLET ORAL at 22:10

## 2018-03-02 RX ADMIN — Medication 10 G: at 13:05

## 2018-03-02 RX ADMIN — EPHEDRINE SULFATE 5 MG: 50 INJECTION, SOLUTION INTRAVENOUS at 13:34

## 2018-03-02 RX ADMIN — SODIUM CHLORIDE: 9 INJECTION, SOLUTION INTRAVENOUS at 13:27

## 2018-03-02 ASSESSMENT — LIFESTYLE VARIABLES: TOBACCO_USE: 1

## 2018-03-02 NOTE — ANESTHESIA PROCEDURE NOTES
Peripheral nerve/Neuraxial procedure note : intrathecal  Pre-Procedure    Location:   Procedure Times:3/2/2018 12:27 PM and 3/2/2018 12:32 PM  Pre-Anesthestic Checklist: patient identified, IV checked, site marked, risks and benefits discussed, informed consent, monitors and equipment checked, pre-op evaluation, at physician/surgeon's request and post-op pain management    Timeout  Correct Patient: Yes   Correct Procedure: Yes   Correct Site: Yes   Correct Laterality: Yes   Correct Position: Yes   Site Marked: Yes   .   Procedure Documentation    .    Procedure:    Intrathecal.  Insertion Site:L4-5  (midline approach)      Patient Prep;mask, sterile gloves, chlorhexidine gluconate and isopropyl alcohol, patient draped.  .  Needle: Spinal Needle (gauge): 25 Spinal/LP Needle Length (inches): 3.5 # of attempts: 1 and # of redirects:  Introducer used .       Assessment/Narrative  Paresthesias: No.  .  .  clear CSF fluid removed . Time Injected: 12:32

## 2018-03-02 NOTE — H&P
"  2018    Joaquina Perez  [id number]            OB Admit History & Physical      Ms. Fahad Perez  is here at 38 weeks 3 days gestation  EDC 3.13.2018 with ruptured membranes at 8.30 am this morning  PREVIOUS HISTORY OF  SECTION 8 YEARS AGO FOR CPD    She has noticed laeakage of amniotic fluid at 8,30 am this morning    Patient's last menstrual period was 2017 (exact date).   Her Estimated Date of Delivery: Mar 13, 2018  , making her 38w3d  wks.      Estimated body mass index is 30.95 kg/(m^2) as calculated from the following:    Height as of this encounter: 1.651 m (5' 5\").    Weight as of this encounter: 84.4 kg (186 lb).  Her prenatal course has been uneventful   Platelets     See prenatal for labs.  Neg  GBBS,   , RH Positive O    Estimated fetal weight=  7 1/2 lbs       She is a 38 year old   Her OB history:   Obstetric History       T1      L1     SAB1   TAB1   Ectopic0   Multiple0   Live Births1       # Outcome Date GA Lbr Basim/2nd Weight Sex Delivery Anes PTL Lv   4 Current            3 SAB 2017 8w0d    AB, MISSED      2 Term 09 40w0d  4.196 kg (9 lb 4 oz) M -SEC   LALO   1 TAB                        Past Medical History:   Diagnosis Date     ACUTE STRESS REACT NOS 2004          Past Surgical History:   Procedure Laterality Date     C REPAIR CRUCIATE LIGAMENT,KNEE      left knee      SECTION  2009     HC ARTHROTOMY W/OPEN MENISCUS REPAIR      left knee          No current outpatient prescriptions on file.       Allergies: No known drug allergies      REVIEW OF SYSTEMS:  NEUROLOGIC:  Negative  EYES:  Negative  ENT:  Negative  GI:  Negative  BREAST:  Negative  :  Negative  GYN:  Negative  CV:  Negative  PULMONARY:  Negative  MUSCULOSKELETAL:  Negative  PSYCH:  Negative        Social History     Social History     Marital status:      Spouse name: N/A     Number of children: 1     Years of education: N/A "     Occupational History     Not on file.     Social History Main Topics     Smoking status: Former Smoker     Packs/day: 0.50     Years: 5.00     Types: Cigarettes     Quit date: 5/16/2004     Smokeless tobacco: Never Used      Comment: Quit ~ March 1, 2009     Alcohol use Yes      Comment: occasional glass of wine     Drug use: No     Sexual activity: Yes     Partners: Male     Birth control/ protection: Pill     Other Topics Concern     Parent/Sibling W/ Cabg, Mi Or Angioplasty Before 65f 55m? No     Social History Narrative    Dairy/d 1 servings/d.     Caffeine 1 servings/d    Exercise 1 x week    Sunscreen used - Yes    Seatbelts used - Yes    Working smoke/CO detectors in the home - Yes    Guns stored in the home - No    Self Breast Exams - Yes    Self Testicular Exam - NA    Eye Exam up to date - Yes    Dental Exam up to date - Yes    Pap Smear up to date - Yes    Mammogram up to date - No    PSA up to date - NA    Dexa Scan up to date - No    Flex Sig / Colonoscopy up to date - No    Immunizations up to date - Yes    Abuse: Current or Past(Physical, Sexual or Emotional)- No    Do you feel safe in your environment - Yes    Andrew Devries MA    7/23/07              Family History   Problem Relation Age of Onset     Family History Negative Mother      Thyroid Disease Mother      Family History Negative Father              Vitals:     With occassional contractions     Alert Awake in NAD  HEENT grossly normal  Neck: no lymphadenopathy or thryoidomegaly  Lungs clear  Back  spine  OK  Heart Dual and regular   No murmurs  ABD gravid, term size on exam with head palpable  Pelvic:   fluid noted,  Cervix is 1 cm / 60 % effaced at -1 station  EXT:  No  edema or calf tenderness  Neuro:  KJ +/+    Assessment:  IUP at 38w3d  With ruptured membranes at 8.30 am this morning with H/O Previous C/S for CPD 8 years ago  She does not want a tubal on discussion this morning though she has signed papers for it.  Plan:   Repeat low segment transverse  section    [unfilled]      Christopher Paez MD  Dept of OB/GYN  2018

## 2018-03-02 NOTE — PLAN OF CARE
Joaquina Perez        NST:  reactive  Start:1008  Stop:1030    Physician: Dr Paez for Dr Jacobs  Reason For Test: rule out rupture of membranes prior to scheduled   EDC:3/13/2018  Gestational Age: 38 3    Comments:  Pt here with reports of gush of fluid at 0830.  NST done and reactive.        Karen Moreland

## 2018-03-02 NOTE — PLAN OF CARE
Problem: Patient Care Overview  Goal: Plan of Care/Patient Progress Review  Outcome: Improving  Pt to Rm 4242 via cart at 1450, report obtained from April ISAÍAS.  Pt in bed reporting pain 2/10, denies needing anything for pain.  Uterus firm, midline at the U.  Hernandez draining clear yellow urine.  Pt taking clear liquids and plans to order food at this time.  Will continue to monitor pt and provide cares as needed.

## 2018-03-02 NOTE — PLAN OF CARE
Dr. Paez  has been here to see patient and  has determined that Joaquina Perez should be readied for unscheduled  section. Plan of care reviewed with patient and support person. Questions answered and concerns addressed by MD. Patient agrees with plan of care. Consent signed. Anesthesia notified. IV  started. Rodolfo cloth applied to abdomen. Patient has been NPO since 0830 when she had a sip of water, no food since last evening. Ready for surgery. To OR per cart. FHT's reassuring    Patient transferred to bed via self.. Patient is alert and oriented X 3, denies any labor pain, but reports pain in her back from herniated disc.   Patient oriented to room, unit, hourly rounding, and plan of care. Explained admission packet with patient bill of rights brochure. Will continue to monitor and document as needed.     Inpatient nursing criteria listed below was met:    Health care directives status obtained and documented: Yes  Patient identifies a surrogate decision maker: Yes   If yes, who:Elvis Contact Information: [phone]  Core Measure diagnosis present:: No  Vaccine assessment done and vaccines ordered if appropriate. Current on vaccines  Clergy visit ordered if patient requests: pt declined  Skin issues/needs documented:N/A  Isolation needs addressed, if appropriate: N/A  [facility] Fall Risk Assessment completed:  Yes  Fall Prevention (Med and High risk): Care plan updated, Education given and documented and signage used: N/A  Care Plan initiated: Yes  Education Documented (Reminder to educate patient if MRSA is present on admission): Yes  Education Assessment documented:Yes  Patient has discharge needs (If yes, please explain): No  Pt to pre-op at 1155 via cart, accompanied by myself and her spouse.  Report given to Pre-op RN, relinquished care of pt.

## 2018-03-02 NOTE — TELEPHONE ENCOUNTER
3/2/2018 8:54 AM  Joaquina Perez 1980 [phone] (home)   Pt of : No data on file.   Estimated Date of Delivery: Mar 13, 2018 38w3d    Patient Active Problem List   Diagnosis     DNS (deviated nasal septum)     History of closed fracture of nasal bones     PEYTON (generalized anxiety disorder)     Bilateral leg pain     Elderly multigravida in first trimester     History of  section     BV (bacterial vaginosis)     Supervision of other high risk pregnancy, antepartum     Adjustment disorder with mixed anxiety and depressed mood     Need for influenza vaccination     Fibroid uterus     Need for Tdap vaccination     Thrombocytopenia during pregnancy (H)         Spoke with Joaquina Perez   Pt lives 10 miles away.  Reason for call per pt  Pt got up and felt a gush of fluid, around 8:30 am  Are you having contractions? No  Are you having any bloody show: No  Are you leaking any fluids/ has your water broken? Yes, clear fluid  Is your baby moving normally: Yes  Did you have any complications with this or a previous pregnancy? No    Backache: No  Pressure: No  Vaginal discharge? No   Patient advised: To come to [facility] to be evaluated.  Pt verbalized understanding.       Call taken by Karen Moreland

## 2018-03-02 NOTE — OR NURSING
Patient on cart with baby, significant other at bedside. DEnies pain or discomfort. No bleeding noted. No clots, fundus firm midline. To transport to floor.

## 2018-03-02 NOTE — ANESTHESIA CARE TRANSFER NOTE
Patient: Joaquina Perez    Procedure(s):   - Wound Class: II-Clean Contaminated    Diagnosis: * No pre-op diagnosis entered *  Diagnosis Additional Information: No value filed.    Anesthesia Type:   Spinal, Periph. Nerve Block for postop pain     Note:  Airway :Nasal Cannula  Patient transferred to:PACU  Handoff Report: Identifed the Patient, Identified the Reponsible Provider, Reviewed the pertinent medical history, Discussed the surgical course, Reviewed Intra-OP anesthesia mangement and issues during anesthesia, Set expectations for post-procedure period and Allowed opportunity for questions and acknowledgement of understanding      Vitals: (Last set prior to Anesthesia Care Transfer)    CRNA VITALS  3/2/2018 1316 - 3/2/2018 1358      3/2/2018             Resp Rate (observed): (!)  1    Resp Rate (set): 8                Electronically Signed By: MANASA Holman CRNA  March 2, 2018  1:58 PM

## 2018-03-02 NOTE — BRIEF OP NOTE
[facility]    Pre-operative diagnosis: Term pregnancy at 38 w 3d gestation with previous history of  section admitted with ruptured membranes   Post-operative diagnosis: Same   Procedure: Repeat low segment transverse  section   Surgeon: Christopher Paez MD   Assistant(s): None   Anesthesia: Spinal anesthesia   Estimated blood loss: 600ml   Total IV fluids: (See anesthesia record)   Blood transfusion: No transfusion was given during surgery   Total urine output: (See anesthesia record)   Drains: Hernandez catheter   Specimens: Placenta     3 vessel umbilical cord   Findings: Female infant  Apgars 9/9   Complications: None   Condition: Stable   Comments: See dictated operative report for full details

## 2018-03-02 NOTE — PLAN OF CARE
OB Triage Note  Joaquina Perez  MRN: [id number]  Gestational Age: 38w3d      Joaquina Perez presents for rule out rupture of membranes at 09 (sign/symptom/concern).  Pt is scheduled for a repeat  for next Tuesday.     Pt denies any regular contractions but states she got up and felt a gush of clear fluid around 0830, pt stated she has continued to leak fluid.    Dr Paez notified of arrival and condition.  Oriented patient to surroundings. Call light within reach.     FHT: reassuring    Uterine Assessment:Contractions: none    Plan:  -Initial NST, then fetal/uterine monitoring per MD/patient plan.  -Sterile vaginal exam/sterile speculum exam.  -Nursing education on plan of care provided.

## 2018-03-02 NOTE — IP AVS SNAPSHOT
MRN:[id number]                      After Visit Summary   3/2/2018    Joaquina Perez    MRN: [id number]           Thank you!     Thank you for choosing [facility] for your care. Our goal is always to provide you with excellent care. Hearing back from our patients is one way we can continue to improve our services. Please take a few minutes to complete the written survey that you may receive in the mail after you visit with us. Thank you!        Patient Information     Date Of Birth          1980        About your hospital stay     You were admitted on:  2018 You last received care in the:  [facility]    You were discharged on:  2018       Who to Call     For medical emergencies, please call 911.  For non-urgent questions about your medical care, please call your primary care provider or clinic, [phone]  For questions related to your surgery, please call your surgery clinic        Attending Provider     Provider Christopher Vickers MD OB/Gyn       Primary Care Provider Office Phone # Fax #    Seymour Alcantar [phone]      Your next 10 appointments already scheduled     Mar 12, 2018  1:30 PM CDT   (Arrive by 1:15 PM)   Post Op with Geetha Jacobs MD   [facility] ([facility] )    [address] [place] [id number]   [phone]              Further instructions from your care team       An appointment to see Mary/ on  at 1pm has been made.    Postop  Birth Instructions    Activity    Do not lift more than 10 pounds for 6 weeks after surgery.  Ask family and friends for help when you need it.  No driving until you have stopped taking your narcotic pain medications   No heavy exercise or activity for 6 weeks.  Don't do anything that will put a strain on your surgery site.  Don't strain when using the toilet.  Your care team may prescribe a stool softener if  you have problems with your bowel movements.    To care for your incision:    Keep the incision clean and dry.  Do not soak your incision in water. No swimming or hot tubs until it has fully healed. You may soak in the bathtub if the water level is below your incision.  Do not use peroxide, gel, cream, lotion, or ointment on your incision.  Adjust your clothes to avoid pressure on your surgery site (check the elastic in your underwear for example).    You may see a small amount of clear or pink drainage and this is normal.  Check with your health care provider:    If the drainage increases or has an odor.  If the incision reddens, you have swelling, or develop a rash.  If you have increased pain and the medicine we prescribed doesn't help.  If you have a fever above 100.4 F (38 C) with or without chills when placing thermometer under your tongue.  The area around your incision (surgery wound), will feel numb.  This is normal. The numbness should go away in less than a year.     Keep your hands clean:  Always wash your hands before touching your incision (surgery wound). This helps reduce your risk of infection. If your hands aren't dirty, you may use an alcohol hand-rub to clean your hands. Keep your nails clean and short.    Call your healthcare provider if you have any of these symptoms:    You soak a sanitary pad with blood within 1 hour, or you see blood clots larger than a golf ball.  Bleeding that lasts more than 6 weeks.  Vaginal discharge that smells bad.  Severe pain, cramping or tenderness in your lower belly area.  A need to urinate more frequently (use the toilet more often), more urgently (use the toilet very quickly), or it burns when you urinate.  Nausea and vomiting.  Redness, swelling or pain around a vein in your leg.  Problems breastfeeding or a red or painful area on your breast.  Chest pain and cough or are gasping for air.  Problems with coping with sadness, anxiety or depression. If you have  "concerns about hurting yourself or the baby, call your provider immediately. The Safe Place for Newborns law allows you to bring your baby to the hospital up to 7 days after birth, no questions asked.  You have questions or concerns after you return home.      Pending Results     No orders found from 2/28/2018 to 3/3/2018.            Statement of Approval     Ordered          03/05/18 0837  I have reviewed and agree with all the recommendations and orders detailed in this document.  EFFECTIVE NOW     Approved and electronically signed by:  Geetha Jacobs MD             Admission Information     Date & Time Provider Department Dept. Phone    3/2/2018 Christopher Paez MD [facility] [phone]      Your Vitals Were     Blood Pressure Pulse Temperature Respirations Height Weight    99/60 86 98  F (36.7  C) (Oral) 16 1.651 m (5' 5\") 84.4 kg (186 lb)    Last Period Pulse Oximetry BMI (Body Mass Index)             06/12/2017 (Exact Date) 98% 30.95 kg/m2         [facility] Information     [employer] gives you secure access to your electronic health record. If you see a primary care provider, you can also send messages to your care team and make appointments. If you have questions, please call your primary care clinic.  If you do not have a primary care provider, please call [phone] and they will assist you.        Care EveryWhere ID     This is your Care EveryWhere ID. This could be used by other organizations to access your [facility] medical records  [id number]        Equal Access to Services     ENA OCONNELL : Hadii erika Arvizu, waaxda luqadaha, qaybta kaalmada criss, sherice stephen. So [facility] [phone].    ATENCIÓN: Si habla español, tiene a moody disposición servicios gratuitos de asistencia lingüística. Llame al [phone].    We comply with applicable federal civil rights laws and [place] laws. We do not discriminate on the basis of race, color, national " origin, age, disability, sex, sexual orientation, or gender identity.               Review of your medicines      CONTINUE these medicines which have NOT CHANGED        Dose / Directions    PEPCID AC MAXIMUM STRENGTH PO        Dose:  10 mg   Take 10 mg by mouth   Refills:  0       prenatal multivitamin plus iron 27-0.8 MG Tabs per tablet   Used for:  Family planning, Depression with anxiety        Dose:  1 tablet   Take 1 tablet by mouth daily   Refills:  0       TYLENOL PO        Dose:  500 mg   Take 500 mg by mouth   Refills:  0                Protect others around you: Learn how to safely use, store and throw away your medicines at www.disposemymeds.org.             Medication List: This is a list of all your medications and when to take them. Check marks below indicate your daily home schedule. Keep this list as a reference.      Medications           Morning Afternoon Evening Bedtime As Needed    PEPCID AC MAXIMUM STRENGTH PO   Take 10 mg by mouth   Last time this was given:  10 mg on 3/3/2018 12:00 PM                                prenatal multivitamin plus iron 27-0.8 MG Tabs per tablet   Take 1 tablet by mouth daily                                TYLENOL PO   Take 500 mg by mouth

## 2018-03-02 NOTE — ANESTHESIA PREPROCEDURE EVALUATION
Anesthesia Evaluation     . Pt has had prior anesthetic.     No history of anesthetic complications          ROS/MED HX    ENT/Pulmonary:     (+)tobacco use, Past use quit 2004 packs/day  , . .    Neurologic:       Cardiovascular:  - neg cardiovascular ROS       METS/Exercise Tolerance:     Hematologic:     (+) Other Hematologic Disorder-Thrombocytopenia       Musculoskeletal:   (+) , , other musculoskeletal- L3-4 & L5-S1 Disc Herniation       GI/Hepatic:  - neg GI/hepatic ROS       Renal/Genitourinary:  - ROS Renal section negative       Endo:  - neg endo ROS       Psychiatric:     (+) psychiatric history anxiety and depression      Infectious Disease:  - neg infectious disease ROS       Malignancy:      - no malignancy   Other:    - neg other ROS                 Physical Exam  Normal systems: dental    Airway   Mallampati: II  TM distance: >3 FB  Neck ROM: full    Dental     Cardiovascular   Rhythm and rate: regular and normal      Pulmonary    breath sounds clear to auscultation                    Anesthesia Plan      History & Physical Review  History and physical reviewed and following examination; no interval change.    ASA Status:  2 emergent.    NPO Status:  > 8 hours    Plan for Spinal and Periph. Nerve Block for postop pain   PONV prophylaxis:  Ondansetron (or other 5HT-3) and Dexamethasone or Solumedrol  Plt: 153      Postoperative Care  Postoperative pain management:  IV analgesics, Oral pain medications, Neuraxial analgesia and Peripheral nerve block (Single Shot).      Consents  Anesthetic plan, risks, benefits and alternatives discussed with:  Patient and Spouse.  Use of blood products discussed: Yes.   Use of blood products discussed with Patient and Spouse.  Consented to blood products.  .                          .

## 2018-03-02 NOTE — IP AVS SNAPSHOT
[facility]    [address] [address]    Phone:  [phone]    Fax:  [phone]                                       After Visit Summary   3/2/2018    Joaquina Perez    MRN: [id number]           After Visit Summary Signature Page     I have received my discharge instructions, and my questions have been answered. I have discussed any challenges I see with this plan with the nurse or doctor.    ..........................................................................................................................................  Patient/Patient Representative Signature      ..........................................................................................................................................  Patient Representative Print Name and Relationship to Patient    ..................................................               ................................................  Date                                            Time    ..........................................................................................................................................  Reviewed by Signature/Title    ...................................................              ..............................................  Date                                                            Time

## 2018-03-02 NOTE — ANESTHESIA PROCEDURE NOTES
Peripheral nerve/Neuraxial procedure note : TAP  Pre-Procedure    Location: [place]      Pre-Anesthestic Checklist: patient identified, IV checked, site marked, risks and benefits discussed, informed consent, monitors and equipment checked, pre-op evaluation, at physician/surgeon's request and post-op pain management    Timeout  Correct Patient: Yes   Correct Procedure: Yes   Correct Site: Yes   Correct Laterality: Yes   Correct Position: Yes   Site Marked: Yes   .   Procedure Documentation    .    Procedure:  bilateral  TAP.     Ultrasound used to identify targeted nerve, plexus, or vascular marker and placed a needle adjacent to it., Ultrasound was used to visualize the spread of the anesthetic in close proximity to the above stated nerve. A permanent image is entered into the patient's record.  Patient Prep;mask, sterile gloves, chlorhexidine gluconate and isopropyl alcohol, patient draped.  .  Needle: insulated Needle Gauge: 20.    Needle Length (Inches) 4  Insertion Method: Single Shot.       Assessment/Narrative  Paresthesias: No.  Injection made incrementally with aspirations every 5 mL..  The placement was negative for: blood aspirated and site bleeding.  Bolus given via needle..   Secured via.   Complications: none. Comments:  Assisted by Tristian Nicholas CRNA.

## 2018-03-03 LAB — HGB BLD-MCNC: 9.6 G/DL (ref 11.7–15.7)

## 2018-03-03 PROCEDURE — 12000029 ZZH R&B OB INTERMEDIATE

## 2018-03-03 PROCEDURE — 85018 HEMOGLOBIN: CPT | Performed by: OBSTETRICS & GYNECOLOGY

## 2018-03-03 PROCEDURE — 36415 COLL VENOUS BLD VENIPUNCTURE: CPT | Performed by: OBSTETRICS & GYNECOLOGY

## 2018-03-03 PROCEDURE — 12000027 ZZH R&B OB

## 2018-03-03 PROCEDURE — 25000132 ZZH RX MED GY IP 250 OP 250 PS 637: Performed by: OBSTETRICS & GYNECOLOGY

## 2018-03-03 RX ORDER — FAMOTIDINE 10 MG
10 TABLET ORAL 2 TIMES DAILY PRN
Status: DISCONTINUED | OUTPATIENT
Start: 2018-03-03 | End: 2018-03-05 | Stop reason: HOSPADM

## 2018-03-03 RX ORDER — FERROUS SULFATE 325(65) MG
325 TABLET ORAL 2 TIMES DAILY
Status: DISCONTINUED | OUTPATIENT
Start: 2018-03-03 | End: 2018-03-05 | Stop reason: HOSPADM

## 2018-03-03 RX ORDER — CALCIUM CARBONATE 500 MG/1
500 TABLET, CHEWABLE ORAL DAILY PRN
Status: DISCONTINUED | OUTPATIENT
Start: 2018-03-03 | End: 2018-03-05 | Stop reason: HOSPADM

## 2018-03-03 RX ADMIN — IRON 325 MG: 65 TABLET ORAL at 12:00

## 2018-03-03 RX ADMIN — OXYCODONE HYDROCHLORIDE 10 MG: 5 TABLET ORAL at 21:08

## 2018-03-03 RX ADMIN — OXYCODONE HYDROCHLORIDE 10 MG: 5 TABLET ORAL at 01:53

## 2018-03-03 RX ADMIN — OXYCODONE HYDROCHLORIDE 10 MG: 5 TABLET ORAL at 16:51

## 2018-03-03 RX ADMIN — SENNOSIDES AND DOCUSATE SODIUM 1 TABLET: 8.6; 5 TABLET ORAL at 21:08

## 2018-03-03 RX ADMIN — FAMOTIDINE 10 MG: 10 TABLET, FILM COATED ORAL at 12:00

## 2018-03-03 RX ADMIN — IBUPROFEN 800 MG: 800 TABLET ORAL at 10:14

## 2018-03-03 RX ADMIN — IBUPROFEN 800 MG: 800 TABLET ORAL at 01:53

## 2018-03-03 RX ADMIN — SENNOSIDES AND DOCUSATE SODIUM 1 TABLET: 8.6; 5 TABLET ORAL at 12:00

## 2018-03-03 RX ADMIN — OXYCODONE HYDROCHLORIDE 10 MG: 5 TABLET ORAL at 08:22

## 2018-03-03 RX ADMIN — IRON 325 MG: 65 TABLET ORAL at 21:08

## 2018-03-03 RX ADMIN — IBUPROFEN 800 MG: 800 TABLET ORAL at 18:54

## 2018-03-03 RX ADMIN — OXYCODONE HYDROCHLORIDE 10 MG: 5 TABLET ORAL at 12:44

## 2018-03-03 NOTE — PLAN OF CARE
Problem: Postpartum ( Delivery) (Adult,Obstetrics,Pediatric)  Goal: Signs and Symptoms of Listed Potential Problems Will be Absent, Minimized or Managed (Postpartum)  Signs and symptoms of listed potential problems will be absent, minimized or managed by discharge/transition of care (reference Postpartum ( Delivery) (Adult,Obstetrics,Pediatric) CPG).   Outcome: Improving  Pt up ad aneta, ambulating in room and has ambulated in the halls once today.  Pt encouraged to ambulate in halls four times daily.  Pt voiding without difficulty since phipps was removed.  Fundus firm, midline at the U, scant lochia.  Incision without any drainage, well approximated, telfa over incision site.  Pt encouraged to use IS.  PRN pain meds provided as needed.  Pt planning to nap at this time.  Pt is aware to call with needs.  Will continue to monitor pt and provide cares as needed.

## 2018-03-03 NOTE — PLAN OF CARE
Problem: Patient Care Overview  Goal: Plan of Care/Patient Progress Review  Outcome: Improving  Upon initial assessment pt's BP was low at 80's/50's, pt denied feeling light headed or dizzy at all.  Pt has woken up just prior to vitals being obtained.  Pt sat up in bed, no change in BP.  Pt requesting to get up and brush her teeth.  Pt dangled at the bedside while I was present in the room, pt continued to deny feeling light headed or dizzy.  Pt up to the bathroom with standby assist, pt tolerated activity well with exception of some incisional discomfort with activity.  Pt's BP rechecked after pt was up in the room and was 97/57.  Pt up ad aneta in the room and tolerating activity well.    Assessments as charted. B/P: 97/57, T: 97.7, P: Data Unavailable, R: 18. Rates pain: initially 3/10 at start of this shift, pt was offered prn pain meds, but declined.  Pain increased to 5/10 when pt was up ambulating, pt given prn pain meds. Incision: dressing in place with old shadowed drainage, no new drainage noted.  Plan to removed dressing at 24 hrs post-op. Voiding without difficulty. Fundus firm, midline at the U. Lochia: Light. Activity: unrestricted with out pain  and slightly limited due to incisional pain. Infant feeding: Breast feeding going well.     LATCH Score:   Latch: 2 - Good Latch  Audible Swallowin  Type of Nipple: (Breast/Nipple) 2 - Everted  Comfort: 2 - Soft, Nontender  Hold: 1- minimal assist    Total LATCH Score: 9    Postpartum breastfeeding assessment completed and education provided, see Patient Education Activity.  Items included in the education are:     proper positioning and latch    effectiveness of feeding    manual expression    handling and storing breastmilk    sign/symptoms of infant feeding issues requiring referral to qualified health care provider  Postpartum care education provided, see Patient Education activity. Patient denies needs. Will monitor.  Karen Moreland

## 2018-03-03 NOTE — ANESTHESIA POSTPROCEDURE EVALUATION
Patient: Joaquina Perez    Procedure(s):   - Wound Class: II-Clean Contaminated    Diagnosis:* No pre-op diagnosis entered *  Diagnosis Additional Information: No value filed.    Anesthesia Type:  Spinal, Periph. Nerve Block for postop pain    Note:  Anesthesia Post Evaluation    Patient location during evaluation: Bedside  Patient participation: Able to fully participate in evaluation  Level of consciousness: awake  Pain management: adequate  Airway patency: patent  Cardiovascular status: acceptable  Respiratory status: acceptable  Hydration status: acceptable  PONV: none     Anesthetic complications: None          Last vitals:  Vitals:    03/03/18 0733 03/03/18 0816 03/03/18 1208   BP: (!) 84/52 97/57 98/53   Resp:   18   Temp:      SpO2:   98%         Electronically Signed By: MANASA Holman CRNA  March 3, 2018  3:09 PM

## 2018-03-03 NOTE — PLAN OF CARE
Face to face report given with opportunity to observe patient.    Report given to Tere METZ.     Karen Moreland   3/2/2018  7:25 PM

## 2018-03-03 NOTE — PROGRESS NOTES
[facility]         Assessment and Plan:    Assessment:   Post-operative day #1  Low transverse repeat  section  L&D complications: None      Clean wound without signs of infection.      Plan:   Ambulation encouraged           Interval History:   Doing well.  Pain is well-controlled.  No fevers.  No history of wound drainage, warmth or significant erythema.  Good appetite.  Denies chest pain, shortness of breath, nausea or vomiting.  Ambulatory.  Breastfeeding well.          Significant Problems:    None          Review of Systems:    The patient denies any chest pain, shortness of breath, excessive pain, fever, chills, purulent drainage from the wound, nausea or vomiting.          Medications:     All medications related to the patient's surgery have been reviewed  Current Facility-Administered Medications   Medication     calcium carbonate (TUMS) chewable tablet 500 mg     famotidine (PEPCID) tablet 10 mg     ferrous sulfate (IRON) tablet 325 mg     enoxaparin (LOVENOX) injection 40 mg     naloxone (NARCAN) injection 0.1-0.4 mg     NO Rho (D) immune globulin (RhoGam) needed - mother Rh POSITIVE     lidocaine (LMX4) kit     lidocaine 1 % 1 mL     [START ON 3/4/2018] bisacodyl (DULCOLAX) Suppository 10 mg     dextrose 5% in lactated ringers infusion     diphenhydrAMINE (BENADRYL) capsule 25 mg    Or     diphenhydrAMINE (BENADRYL) injection 25 mg     hydrocortisone 2.5 % cream     HYDROmorphone (PF) (DILAUDID) injection 0.3-0.5 mg     ibuprofen (ADVIL/MOTRIN) tablet 400 mg    Or     ibuprofen (ADVIL/MOTRIN) tablet 600 mg    Or     ibuprofen (ADVIL/MOTRIN) tablet 800 mg     lanolin ointment     ondansetron (ZOFRAN) injection 4 mg     oxytocin (PITOCIN) 30 units in 500 mL 0.9% NaCl infusion     senna-docusate (SENOKOT-S;PERICOLACE) 8.6-50 MG per tablet 1 tablet    Or     senna-docusate (SENOKOT-S;PERICOLACE) 8.6-50 MG per tablet 2 tablet      simethicone (MYLICON) chewable tablet 80 mg     [START ON 3/4/2018] sodium phosphate (FLEET ENEMA) 1 enema     lactated ringers BOLUS 1,000 mL     misoprostol (CYTOTEC) tablet 400 mcg     oxytocin (PITOCIN) 30 units in 500 mL 0.9% NaCl infusion     oxytocin (PITOCIN) injection 10 Units     oxyCODONE IR (ROXICODONE) tablet 5-15 mg             Physical Exam:   All vitals stable  Temp: 97.6  F (36.4  C) Temp src: Oral BP: 93/50     Resp: 18 SpO2: 97 % O2 Device: None (Room air)    Wound clean and dry with minimal or no drainage.  Surrounding skin with minimal erythema.          Data:     All laboratory data related to this surgery reviewed  Hemoglobin   Date Value Ref Range Status   03/03/2018 9.6 (L) 11.7 - 15.7 g/dL Final   03/02/2018 Canceled, Test credited 11.7 - 15.7 g/dL Corrected     Comment:     CORRECTED ON 03/02 AT 1154: PREVIOUSLY REPORTED AS 12.1   03/02/2018 11.9 11.7 - 15.7 g/dL Final   02/06/2018 11.9 11.7 - 15.7 g/dL Final   12/21/2017 11.3 (L) 11.7 - 15.7 g/dL Final     No imaging studies have been ordered  Start FeSO4 tablets  325 mg bid  Increase oxycodone IR to 5-15 mg q 4 hourly    Christopher Paez MD

## 2018-03-03 NOTE — OP NOTE
[facility] [place] [facility]   Section Operative Note    Indications: Repeat  section  Gestational age: 38w3d  Surgeon: Dr. Christopher Paez  Anesthetist:  Luana Isabel  Anesthesia:   Spinal  Complications:  None  Blood Loss: 600 ml      Pre-operative Diagnosis:  Term Pregnancy; Ruptured membranes with previous  section for CPD  Post-operative Diagnosis: Same    Procedure Details:  The patient was seen in the Holding Room. The risks, benefits, complications, treatment options, and expected outcomes were discussed with the patient.  The patient concurred with the proposed plan, giving informed consent.  The site of surgery properly noted. The patient was taken to Operating Room # 1, identified as Joaquina Perez and the procedure verified as  Delivery. A Time Out was held and the above information confirmed.    After a spinal anesthetic was administered, the patient was draped and prepped in the usual sterile manner. The skin was tested with a tooth forceps for adequate anesthesia.  A Pfannenstiel incision was made and carried down through the subcutaneous tissue to the fascia. Fascial incision was made and extended transversely. The fascia was  from the underlying rectus tissue superiorly and inferiorly. The peritoneum was identified and entered. Peritoneal incision was extended longitudinally. The utero-vesical peritoneal reflection was incised transversely and the bladder flap was bluntly freed from the lower uterine segment. A low transverse uterine incision was made. Delivered from cephalic  presentation was a female infant on 3.2.2018 at 12.57 pm weighing 7 lb 2.6 oz  Apgar scores of 9 at one minute and 9 at five minutes. After the umbilical cord was clamped and cut cord blood was obtained for evaluation. The placenta was removed intact and appeared normal. The uterine outline, tubes and ovaries appeared  normal. The uterine incision was closed in 2 layers the first layer being interrupted No 1 Vicril and second layer running No 1 Vicril. Hemostasis was observed. Lavage was carried out until clear. The peritoneum was closed with 3 '0' Vicril.  The fascia was then reapproximated with running sutures of No 1 Vicryl. The subcutaneous fat was closed with 2 '0' Vicril The skin was reapproximated with staples.    Instrument, sponge, and needle counts were correct prior the abdominal closure and at the conclusion of the case.     Findings:  Female infant 7 lb 2.6 oz  3250 Gm    Apgars 9 at one min and 9 at 5 mins    Estimated Blood Loss: 600 ml     Total IV Fluids: (See anesthesia record)     Drains: Hernandez catheter        Specimens: Placenta           Implants: None           Complications:  None           Disposition: To PACU           Condition: Stable    Attending Attestation: Christopher CALI, personally performed the above procedure on this patient.

## 2018-03-03 NOTE — PLAN OF CARE
Pt up to the bathroom and able to void 400ml of yellow urine at 1115.  Pt up ad aneta in the room and tolerating activity well.  Uterus firm, U-1, scant lochia, surgical dressing removed, and telfa was placed over incision.  Staples intact, scant amount of bleeding from one area in the middle of the incision.  Pt reports adequate pain control with prn pain meds.  Will continue to monitor pt and provide cares as needed.

## 2018-03-03 NOTE — PLAN OF CARE
Dr Paez was on the unit, informed him pt is having heart burn/reflux and would like her pepcid that she takes at home ordered also updated Dr Paez of pt's am Hgb of 9.6.  Orders obtained for pepcid and ferous sulfate.  Will update pt of the plan.

## 2018-03-03 NOTE — PROGRESS NOTES
Assessments as charted. B/P: 94/53, T: 98.6, P: Data Unavailable, R: 18. Rates pain: 4/10 medicated with oral pain meds.. Incision: drainage as marked no new drainage. Hernandez in place. Fundus firm. Lochia: Light. Activity: unrestricted with out pain  and normal activity. Infant feeding: Breast feeding going well.     LATCH Score:   Latch: 2 - Good Latch  Audible Swallowin - Few  Type of Nipple: (Breast/Nipple) 2 - Everted  Comfort: 2 - Soft, Nontender  Hold: 1 - Min. Assist   Total LATCH Score:     Postpartum breastfeeding assessment completed and education provided, see Patient Education Activity.  Items included in the education are:     proper positioning and latch    effectiveness of feeding    manual expression    handling and storing breastmilk    maintenance of breastfeeding for the first 6 months    sign/symptoms of infant feeding issues requiring referral to qualified health care provider  Postpartum care education provided, see Patient Education activity. Patient denies needs. Will monitor.  Rohini Martinez

## 2018-03-03 NOTE — PLAN OF CARE
Problem: Postpartum ( Delivery) (Adult,Obstetrics,Pediatric)  Intervention: Support Surgical/Anesthesia Recovery  Up walking I room tolerating activity well. Breast feeding baby with a good latch and suck.

## 2018-03-04 PROCEDURE — 25000132 ZZH RX MED GY IP 250 OP 250 PS 637: Performed by: OBSTETRICS & GYNECOLOGY

## 2018-03-04 PROCEDURE — 12000027 ZZH R&B OB

## 2018-03-04 RX ADMIN — SENNOSIDES AND DOCUSATE SODIUM 2 TABLET: 8.6; 5 TABLET ORAL at 08:21

## 2018-03-04 RX ADMIN — IBUPROFEN 800 MG: 800 TABLET ORAL at 23:45

## 2018-03-04 RX ADMIN — OXYCODONE HYDROCHLORIDE 10 MG: 5 TABLET ORAL at 17:43

## 2018-03-04 RX ADMIN — OXYCODONE HYDROCHLORIDE 10 MG: 5 TABLET ORAL at 22:42

## 2018-03-04 RX ADMIN — OXYCODONE HYDROCHLORIDE 10 MG: 5 TABLET ORAL at 02:38

## 2018-03-04 RX ADMIN — OXYCODONE HYDROCHLORIDE 10 MG: 5 TABLET ORAL at 08:21

## 2018-03-04 RX ADMIN — IBUPROFEN 800 MG: 800 TABLET ORAL at 10:24

## 2018-03-04 RX ADMIN — IRON 325 MG: 65 TABLET ORAL at 08:21

## 2018-03-04 RX ADMIN — IBUPROFEN 800 MG: 800 TABLET ORAL at 02:38

## 2018-03-04 RX ADMIN — OXYCODONE HYDROCHLORIDE 10 MG: 5 TABLET ORAL at 12:17

## 2018-03-04 RX ADMIN — IBUPROFEN 800 MG: 800 TABLET ORAL at 17:54

## 2018-03-04 RX ADMIN — IRON 325 MG: 65 TABLET ORAL at 22:41

## 2018-03-04 RX ADMIN — SENNOSIDES AND DOCUSATE SODIUM 1 TABLET: 8.6; 5 TABLET ORAL at 22:42

## 2018-03-04 NOTE — PLAN OF CARE
Problem: Patient Care Overview  Goal: Plan of Care/Patient Progress Review  Outcome: No Change  Assessments as charted. B/P: 111/55, T: 97.8, P: 68, R: 18. Rates pain: 2/10 reports adequate pain control with prn pain meds. Incision: Healing well, well approximated, without signs of infection and no drainage. Incision open to air.  Voiding without difficulty. Fundus firm, midline at the U. Lochia: Light. Activity: slightly limited due to pain.  Encouraged ambulation in the alford, pt verbalized understanding.  Pt has been ambulating in her room, but hasn't been in the alford yet this shift, plan is to ambulate in the halls after she naps. Infant feeding: Breast feeding going well, hand expressed colostrum and spoon fed it to baby after she .    Pt tearful this afternoon.  She stated she has anxiety and is feeling bad that her  isn't feeling well because of allergies and her parents are staying at their house and her  is having to help with pt's mother who has Parkinsons.  Pt's  is feeling overwhelmed and is wanting pt's mother to go home (to the [facility]) today instead of stay for a week as planned.  Pt's dad is going home today. Pt agrees that it would be best for her mom to go home today as she isn't going to be able to help care for her at home as she is healing from surgery and will have the baby to care for, but feels badly for asking her mom to leave.  Pt tearful at times.  Pt open during conversation, support provided.  Pt stated she felt better after she talked to her mother about the change in plans and her mother was understanding.  Briefly discussed all the changes pt is going through and reasons see may also be feeling emotional, discussed sleep deprivation, hormone changes and briefly postpartum depression.  Once pt wakes I will discuss postapartum depression in more detail so pt is aware the signs to watch for at home.    LATCH Score:   Latch: 2 - Good Latch  Audible  Swallowin - Spontaneous & frequent  Type of Nipple: (Breast/Nipple) 2 - Everted  Comfort: 1 - Filling, small blisters, mild/mod pain  Hold: 2 - No Assist    Total LATCH Score: 9    Postpartum breastfeeding assessment completed and education provided, see Patient Education Activity.  Items included in the education are:     proper positioning and latch    effectiveness of feeding    manual expression    handling and storing breastmilk    sign/symptoms of infant feeding issues requiring referral to qualified health care provider  Postpartum care education provided, see Patient Education activity. Patient denies needs. Will monitor.  Karen Moreland

## 2018-03-04 NOTE — PLAN OF CARE
Face to face report given with opportunity to observe patient.    Report given to Tere METZ.     Karen Moreland   3/3/2018  7:09 PM

## 2018-03-04 NOTE — PLAN OF CARE
Problem: Postpartum ( Delivery) (Adult,Obstetrics,Pediatric)  Goal: Signs and Symptoms of Listed Potential Problems Will be Absent, Minimized or Managed (Postpartum)  Signs and symptoms of listed potential problems will be absent, minimized or managed by discharge/transition of care (reference Postpartum ( Delivery) (Adult,Obstetrics,Pediatric) CPG).   discussed plan for a round the clock pain medication.  Is up ad aneta in room, does breast feed baby well with a good latch and suck. abd incision is dry and intact. Is ambulating around in room voiding in a good amount.does state pain is minimal with oral pain meds. Breast feeding baby handles baby with ease. Tere fraser rn

## 2018-03-04 NOTE — PROGRESS NOTES
[facility]   [facility] Obstetrics Post-Op / Progress Note         Assessment and Plan:    Assessment:   Post-operative day #2  Low transverse repeat  section  L&D complications: None      Clean wound without signs of infection.      Plan:   Ambulation encouraged           Interval History:   Doing well.  Pain is well-controlled.  No fevers.  No history of wound drainage, warmth or significant erythema.  Good appetite.  Denies chest pain, shortness of breath, nausea or vomiting.  Ambulatory.  Breastfeeding well.          Significant Problems:    None  Post op anemia from acute blood loss  Hb           Review of Systems:    -          Medications:     All medications related to the patient's surgery have been reviewed  Current Facility-Administered Medications   Medication     calcium carbonate (TUMS) chewable tablet 500 mg     famotidine (PEPCID) tablet 10 mg     ferrous sulfate (IRON) tablet 325 mg     enoxaparin (LOVENOX) injection 40 mg     naloxone (NARCAN) injection 0.1-0.4 mg     NO Rho (D) immune globulin (RhoGam) needed - mother Rh POSITIVE     lidocaine (LMX4) kit     lidocaine 1 % 1 mL     bisacodyl (DULCOLAX) Suppository 10 mg     dextrose 5% in lactated ringers infusion     diphenhydrAMINE (BENADRYL) capsule 25 mg    Or     diphenhydrAMINE (BENADRYL) injection 25 mg     hydrocortisone 2.5 % cream     HYDROmorphone (PF) (DILAUDID) injection 0.3-0.5 mg     ibuprofen (ADVIL/MOTRIN) tablet 400 mg    Or     ibuprofen (ADVIL/MOTRIN) tablet 600 mg    Or     ibuprofen (ADVIL/MOTRIN) tablet 800 mg     lanolin ointment     ondansetron (ZOFRAN) injection 4 mg     oxytocin (PITOCIN) 30 units in 500 mL 0.9% NaCl infusion     senna-docusate (SENOKOT-S;PERICOLACE) 8.6-50 MG per tablet 1 tablet    Or     senna-docusate (SENOKOT-S;PERICOLACE) 8.6-50 MG per tablet 2 tablet     simethicone (MYLICON) chewable tablet 80 mg     sodium phosphate (FLEET ENEMA) 1 enema     lactated ringers BOLUS 1,000 mL      misoprostol (CYTOTEC) tablet 400 mcg     oxytocin (PITOCIN) 30 units in 500 mL 0.9% NaCl infusion     oxytocin (PITOCIN) injection 10 Units     oxyCODONE IR (ROXICODONE) tablet 5-15 mg             Physical Exam:   All vitals stable  Temp: 97.7  F (36.5  C) Temp src: Axillary BP: 104/58 Pulse: 68   Resp: 16 SpO2: 98 %      Wound clean and dry with minimal or no drainage.  Surrounding skin with minimal erythema.          Data:     All laboratory data related to this surgery reviewed  Hemoglobin   Date Value Ref Range Status   03/03/2018 9.6 (L) 11.7 - 15.7 g/dL Final   03/02/2018 Canceled, Test credited 11.7 - 15.7 g/dL Corrected     Comment:     CORRECTED ON 03/02 AT 1154: PREVIOUSLY REPORTED AS 12.1   03/02/2018 11.9 11.7 - 15.7 g/dL Final   02/06/2018 11.9 11.7 - 15.7 g/dL Final   12/21/2017 11.3 (L) 11.7 - 15.7 g/dL Final     No imaging studies have been ordered  Doing well      View to discharge tomorrow.    Christopher Paez MD

## 2018-03-04 NOTE — PLAN OF CARE
Problem: Patient Care Overview  Goal: Plan of Care/Patient Progress Review  Outcome: Improving  Assessments as charted. B/P: 104/58, T: 97.7, P: 68, R: 16. Rates pain: 5/10 pt reports adequate pain contro with prn pain meds. Incision: Healing well, well approximated, without signs of infection and no drainage. Voiding without difficulty. Fundus firm, midline at the U. Lochia: Light. Activity: slightly limited due to pain, but pt is up ambulating in room and plans to walk in the halls today. Infant feeding: Breast feeding going well.     LATCH Score:   Latch: 2 - Good Latch  Audible Swallowin - Spontaneous & frequent  Type of Nipple: (Breast/Nipple) 2 - Everted  Comfort: 1 - Filling, small blisters, mild/mod pain  Hold: 1 - Min. Assist   Total LATCH Score: 8    Postpartum breastfeeding assessment completed and education provided, see Patient Education Activity.  Items included in the education are:     proper positioning and latch    effectiveness of feeding    manual expression    sign/symptoms of infant feeding issues requiring referral to qualified health care provider  Postpartum care education provided, see Patient Education activity. Patient denies needs. Will monitor.  Karen Moreland

## 2018-03-04 NOTE — PLAN OF CARE
Ira  Depression Screen complete, pt scored 4.  Discussed in detail postpartum depression.  Pt stated she feels like her mood is fine but was just anxious and sad earlier today with everything going on with her parents leaving.  Pt aware to call with any questions or concerns regarding anxiety or depression during hospitalization or once she's home.

## 2018-03-05 VITALS
HEIGHT: 65 IN | SYSTOLIC BLOOD PRESSURE: 99 MMHG | TEMPERATURE: 98 F | DIASTOLIC BLOOD PRESSURE: 60 MMHG | WEIGHT: 186 LBS | OXYGEN SATURATION: 98 % | HEART RATE: 86 BPM | RESPIRATION RATE: 16 BRPM | BODY MASS INDEX: 30.99 KG/M2

## 2018-03-05 PROBLEM — N76.0 BV (BACTERIAL VAGINOSIS): Status: RESOLVED | Noted: 2017-03-13 | Resolved: 2018-03-05

## 2018-03-05 PROBLEM — Z23 NEED FOR TDAP VACCINATION: Status: RESOLVED | Noted: 2017-12-26 | Resolved: 2018-03-05

## 2018-03-05 PROBLEM — Z98.891 HISTORY OF CESAREAN SECTION: Status: RESOLVED | Noted: 2017-03-06 | Resolved: 2018-03-05

## 2018-03-05 PROBLEM — Z23 NEED FOR INFLUENZA VACCINATION: Status: RESOLVED | Noted: 2017-09-12 | Resolved: 2018-03-05

## 2018-03-05 PROBLEM — B96.89 BV (BACTERIAL VAGINOSIS): Status: RESOLVED | Noted: 2017-03-13 | Resolved: 2018-03-05

## 2018-03-05 PROBLEM — O09.521 ELDERLY MULTIGRAVIDA IN FIRST TRIMESTER: Status: RESOLVED | Noted: 2017-03-06 | Resolved: 2018-03-05

## 2018-03-05 PROBLEM — O09.899 SUPERVISION OF OTHER HIGH RISK PREGNANCY, ANTEPARTUM: Status: RESOLVED | Noted: 2017-08-08 | Resolved: 2018-03-05

## 2018-03-05 PROBLEM — Z36.89 ENCOUNTER FOR TRIAGE IN PREGNANT PATIENT: Status: RESOLVED | Noted: 2018-03-02 | Resolved: 2018-03-05

## 2018-03-05 LAB — PLATELET # BLD AUTO: 149 10E9/L (ref 150–450)

## 2018-03-05 PROCEDURE — 36415 COLL VENOUS BLD VENIPUNCTURE: CPT | Performed by: OBSTETRICS & GYNECOLOGY

## 2018-03-05 PROCEDURE — 25000132 ZZH RX MED GY IP 250 OP 250 PS 637: Performed by: OBSTETRICS & GYNECOLOGY

## 2018-03-05 PROCEDURE — 85049 AUTOMATED PLATELET COUNT: CPT | Performed by: OBSTETRICS & GYNECOLOGY

## 2018-03-05 RX ADMIN — SENNOSIDES AND DOCUSATE SODIUM 1 TABLET: 8.6; 5 TABLET ORAL at 09:09

## 2018-03-05 RX ADMIN — OXYCODONE HYDROCHLORIDE 10 MG: 5 TABLET ORAL at 05:48

## 2018-03-05 RX ADMIN — IRON 325 MG: 65 TABLET ORAL at 09:09

## 2018-03-05 RX ADMIN — OXYCODONE HYDROCHLORIDE 5 MG: 5 TABLET ORAL at 10:02

## 2018-03-05 NOTE — PLAN OF CARE
Face to face report given with opportunity to observe patient.    Report given to ISAÍAS Hannon.    Angelica Orozco  3/5/2018, 7:13 AM

## 2018-03-05 NOTE — PLAN OF CARE
PHQ9 and GAD7 done.  PHQ-9 total score = 2  PEYTON-7 total score = 4    Paper forms place in chart.

## 2018-03-05 NOTE — PROGRESS NOTES
Patient:   Joaquina MORROWB:  Elvis  Marital status:     Lives at: home in [place]  Lives with:  and 8 year old son  Support System: friends and family    Primary PCP:  Seymour Alcantar  OB:  Reynaldo  Baby PCP: Katharine  [facility] [employer]    Agency Contacts: [facility] for medical and WIC  Mental Health: has a history of anxiety, feels it is well controlled at this time and would talk with Dr Alcantar if needed. She worked with a counselor several years ago and will again if she feels it is necessary.   Violence: denies  Substance Abuse: no concerns    Adequate resources for needs (housing, utilities, food/med): yes    Transportation:  She and Elvis both drive  Car Seat: Yes  Diapers:  Yes    Education concerns on self/baby care:   Feels confident she can care for herself and her . Notes that she has received a great deal of education here and that after her last delivery down in the [facility] she did not.

## 2018-03-05 NOTE — PROGRESS NOTES
"[facility]   Obstetrics Post-Op / Progress Note   Post op day 3  Subjective: sore  Voiding: y  Breast feeding: y          Medications:   All medications related to the patient's surgery have been reviewed   Current Facility-Administered Medications   Medication     calcium carbonate (TUMS) chewable tablet 500 mg     famotidine (PEPCID) tablet 10 mg     ferrous sulfate (IRON) tablet 325 mg     enoxaparin (LOVENOX) injection 40 mg     naloxone (NARCAN) injection 0.1-0.4 mg     NO Rho (D) immune globulin (RhoGam) needed - mother Rh POSITIVE     lidocaine (LMX4) kit     lidocaine 1 % 1 mL     bisacodyl (DULCOLAX) Suppository 10 mg     dextrose 5% in lactated ringers infusion     diphenhydrAMINE (BENADRYL) capsule 25 mg    Or     diphenhydrAMINE (BENADRYL) injection 25 mg     hydrocortisone 2.5 % cream     HYDROmorphone (PF) (DILAUDID) injection 0.3-0.5 mg     ibuprofen (ADVIL/MOTRIN) tablet 400 mg    Or     ibuprofen (ADVIL/MOTRIN) tablet 600 mg    Or     ibuprofen (ADVIL/MOTRIN) tablet 800 mg     lanolin ointment     ondansetron (ZOFRAN) injection 4 mg     oxytocin (PITOCIN) 30 units in 500 mL 0.9% NaCl infusion     senna-docusate (SENOKOT-S;PERICOLACE) 8.6-50 MG per tablet 1 tablet    Or     senna-docusate (SENOKOT-S;PERICOLACE) 8.6-50 MG per tablet 2 tablet     simethicone (MYLICON) chewable tablet 80 mg     sodium phosphate (FLEET ENEMA) 1 enema     lactated ringers BOLUS 1,000 mL     misoprostol (CYTOTEC) tablet 400 mcg     oxytocin (PITOCIN) 30 units in 500 mL 0.9% NaCl infusion     oxytocin (PITOCIN) injection 10 Units     oxyCODONE IR (ROXICODONE) tablet 5-15 mg             Physical Exam:   /59  Pulse 78  Temp 97.9  F (36.6  C) (Oral)  Resp 18  Ht 1.651 m (5' 5\")  Wt 84.4 kg (186 lb)  LMP 06/12/2017 (Exact Date)  SpO2 99%  Breastfeeding? Unknown  BMI 30.95 kg/m2  Awake,Alert, oriented and in no acute distress  Wound: clean  Legs: negative Gino's          Data:   All laboratory data " related to this surgery reviewed:     Hemoglobin   Date Value Ref Range Status   03/03/2018 9.6 (L) 11.7 - 15.7 g/dL Final   ]  Lab Results   Component Value Date    ABO O 03/02/2018      Lab Results   Component Value Date    RH Pos 03/02/2018              Assessment and Plan:    Assessment   Stable pod 3 c/s        Plan:   Appt for staples being made.  Has post op appt and instructions and script               Geetha Jacobs MD

## 2018-03-05 NOTE — LACTATION NOTE
"Initial Lactation Consultation    Joaquina Damonlist                                                                                                    [id number]    Consultation Date: 3/5/2018    Reason for Lactation Referral:routine lactation assessment.    MATERNAL HISTORY   Maternal History: 2nd baby, has 8 year old son, repeat   History of Breast Surgery: No  Breast Changes During Pregnancy: Yes  Breast Feeding History: Yes,  unsuccessful, Length of Time: 3 weeks and quit  Maternal Meds: see eMar    MATERNAL ASSESSMENT    Breast Size: average  Nipple Appearance - Left: intact  Nipple Appearance - Right: intact  Nipple Erectility - Left: erect with stimulation  Nipple Erectility - Right: erect with stimulation  Areolas Compressibility: soft  Nipple Size: average  Milk Supply: colostrum    INFANT ASSESSMENT    Oral Anatomy  Mouth: normal  Palate: normal  Jaw: normal  Tongue: normal    FEEDING   Feeding Time:1200  Position: side-lying  Effort to Latch: awake and alert, latched easily  Results: excellent breast feed    FEEDING PLAN    Home Feeding Plan: Nurse on demand, responding to infant's feeding cues. Snuggle in skin-to-skin to learn positioning and infant cues. Rooming-in encouraged.    LACTATION COMMENTS   Anticipatory guidance provided in regard to \"baby's second night.\"    Link provided for Sian's Plan [place] Station Deep Latch video.   Deep latch explained for proper positioning of breast in infant's mouth, maximizing milk transfer and comfort.  Hand expression taught and return demonstration observed with colostrum present.  Reading signs of satiety reviewed.  \"Ways to know that baby is getting enough\" discussed thoroughly.  Follow-up support information provided.        __________________________________________________________________________________  SONDRA SIMEON RN, IBCLC  3/5/2018      "

## 2018-03-05 NOTE — DISCHARGE INSTRUCTIONS
An appointment to see Mary/Nicolasa on  at 1pm has been made.    Postop  Birth Instructions    Activity    Do not lift more than 10 pounds for 6 weeks after surgery.  Ask family and friends for help when you need it.  No driving until you have stopped taking your narcotic pain medications   No heavy exercise or activity for 6 weeks.  Don't do anything that will put a strain on your surgery site.  Don't strain when using the toilet.  Your care team may prescribe a stool softener if you have problems with your bowel movements.    To care for your incision:    Keep the incision clean and dry.  Do not soak your incision in water. No swimming or hot tubs until it has fully healed. You may soak in the bathtub if the water level is below your incision.  Do not use peroxide, gel, cream, lotion, or ointment on your incision.  Adjust your clothes to avoid pressure on your surgery site (check the elastic in your underwear for example).    You may see a small amount of clear or pink drainage and this is normal.  Check with your health care provider:    If the drainage increases or has an odor.  If the incision reddens, you have swelling, or develop a rash.  If you have increased pain and the medicine we prescribed doesn't help.  If you have a fever above 100.4 F (38 C) with or without chills when placing thermometer under your tongue.  The area around your incision (surgery wound), will feel numb.  This is normal. The numbness should go away in less than a year.     Keep your hands clean:  Always wash your hands before touching your incision (surgery wound). This helps reduce your risk of infection. If your hands aren't dirty, you may use an alcohol hand-rub to clean your hands. Keep your nails clean and short.    Call your healthcare provider if you have any of these symptoms:    You soak a sanitary pad with blood within 1 hour, or you see blood clots larger than a golf ball.  Bleeding that lasts  more than 6 weeks.  Vaginal discharge that smells bad.  Severe pain, cramping or tenderness in your lower belly area.  A need to urinate more frequently (use the toilet more often), more urgently (use the toilet very quickly), or it burns when you urinate.  Nausea and vomiting.  Redness, swelling or pain around a vein in your leg.  Problems breastfeeding or a red or painful area on your breast.  Chest pain and cough or are gasping for air.  Problems with coping with sadness, anxiety or depression. If you have concerns about hurting yourself or the baby, call your provider immediately. The Safe Place for Newborns law allows you to bring your baby to the hospital up to 7 days after birth, no questions asked.  You have questions or concerns after you return home.

## 2018-03-05 NOTE — PLAN OF CARE
Problem: Postpartum ( Delivery) (Adult,Obstetrics,Pediatric)  Goal: Signs and Symptoms of Listed Potential Problems Will be Absent, Minimized or Managed (Postpartum)  Signs and symptoms of listed potential problems will be absent, minimized or managed by discharge/transition of care (reference Postpartum ( Delivery) (Adult,Obstetrics,Pediatric) CPG).   Outcome: Improving   18 0859   Postpartum ( Delivery)   Problems Assessed (Postpartum ) all   Problems Present (Postpartum ) none     Assessments as charted. B/P: 99/60, T: 98, P: 86, R: 16. Rates pain: 2/10. Incision: Healing well, well approximated, without signs of infection and no drainage. Voiding without difficulty. Fundus firm @U midline. Lochia: Light. Activity: normal activity. Infant feeding: Breast feeding going well.     LATCH Score:   Latch: 2 - Good Latch  Audible Swallowin - Spontaneous & frequent  Type of Nipple: (Breast/Nipple) 2 - Everted  Comfort: 1 - Filling, small blisters, mild/mod pain  Hold: 2 - No Assist   Total LATCH Score: 9    Postpartum breastfeeding assessment completed and education provided, see Patient Education Activity.  Items included in the education are:     proper positioning and latch    effectiveness of feeding    manual expression    handling and storing breastmilk    maintenance of breastfeeding for the first 6 months    sign/symptoms of infant feeding issues requiring referral to qualified health care provider  Postpartum care education provided, see Patient Education activity. Patient denies needs. Will monitor.  Susannah Jaimes

## 2018-03-05 NOTE — PLAN OF CARE
Problem: Postpartum ( Delivery) (Adult,Obstetrics,Pediatric)  Intervention: Monitor/Manage Pain  Assessments as charted. B/P: 105/59, T: 97.9, P: 78, R: 18. Rates pain: 4/10 in lower abdomen around incision. Incision: Healing well. Voiding without difficulty. Fundus U/U. Firm. Lochia: Light. Activity: unrestricted with out pain . Infant feeding: Breast feeding going well.     LATCH Score:   Latch: 2 - Good Latch  Audible Swallowin - Spontaneous & frequent  Type of Nipple: (Breast/Nipple) 2 - Everted  Comfort: 1 - Filling, small blisters, mild/mod pain  Hold: 1 - Min. Assist   Total LATCH Score: 8    Postpartum breastfeeding assessment completed and education provided, see Patient Education Activity.  Items included in the education are:     proper positioning and latch    effectiveness of feeding    manual expression    handling and storing breastmilk    maintenance of breastfeeding for the first 6 months    sign/symptoms of infant feeding issues requiring referral to qualified health care provider  Postpartum care education provided, see Patient Education activity. Patient denies needs. Will monitor.  Angelica Orozco

## 2018-03-05 NOTE — DISCHARGE SUMMARY
Joaquina Perez is a 38 year old female    Date of Admission: 3/2/18  Date of Discharge: 3//5/18    Procedure performed:  section     Complications: none    Hemoglobin   Date/Time Value Ref Range Status   2018 05:50 AM 9.6 (L) 11.7 - 15.7 g/dL Final     Lab Results   Component Value Date    ABO O 2018      Lab Results   Component Value Date    RH Pos 2018         She tolerated the procedure well. She did well post operatively. Her discharge lab values were stable. She was discharged home afebrile in stable condition to be followed up in the office.      Joaquina Dumont   Home Medication Instructions DEEPAK:[id number]    Printed on:18 0837   Medication Information                      Acetaminophen (TYLENOL PO)  Take 500 mg by mouth             Famotidine (PEPCID AC MAXIMUM STRENGTH PO)  Take 10 mg by mouth             Prenatal Vit-Fe Fumarate-FA (PRENATAL MULTIVITAMIN  PLUS IRON) 27-0.8 MG TABS  Take 1 tablet by mouth daily                 Has follow up appointment    Geetha Jacobs MD

## 2018-03-05 NOTE — PLAN OF CARE
Patient discharged at 1:25 PM via wheel chair accompanied by spouse and staff. Prescriptions sent with patient to fill . All belongings sent with patient.     Discharge instructions reviewed with Joaquina. Listed belongings gathered and returned to patient.     Patient discharged to Home.     Core Measures and Vaccines  Core Measures applicable during stay: No  Pneumonia and Influenza given prior to discharge, if indicated: N/A    Surgical Patient   Surgical Procedures during stay: Yes  Did patient receive discharge instruction on wound care and recognition of infection symptoms? Yes    MISC  Follow up appointment made:  Yes  Home and hospital aquired medications returned to patient: N/A  Patient reports pain was well managed at discharge: Yes

## 2018-03-08 ENCOUNTER — OFFICE VISIT (OUTPATIENT)
Dept: OBGYN | Facility: OTHER | Age: 38
End: 2018-03-08
Attending: NURSE PRACTITIONER
Payer: COMMERCIAL

## 2018-03-08 VITALS
BODY MASS INDEX: 29.49 KG/M2 | HEART RATE: 89 BPM | OXYGEN SATURATION: 97 % | TEMPERATURE: 98.6 F | HEIGHT: 65 IN | DIASTOLIC BLOOD PRESSURE: 60 MMHG | WEIGHT: 177 LBS | SYSTOLIC BLOOD PRESSURE: 100 MMHG

## 2018-03-08 DIAGNOSIS — Z48.02 ENCOUNTER FOR STAPLE REMOVAL: ICD-10-CM

## 2018-03-08 PROCEDURE — 99212 OFFICE O/P EST SF 10 MIN: CPT | Performed by: NURSE PRACTITIONER

## 2018-03-08 PROCEDURE — G0463 HOSPITAL OUTPT CLINIC VISIT: HCPCS | Mod: 25 | Performed by: COUNSELOR

## 2018-03-08 PROCEDURE — G0463 HOSPITAL OUTPT CLINIC VISIT: HCPCS | Performed by: COUNSELOR

## 2018-03-08 RX ORDER — HYDROCODONE BITARTRATE AND ACETAMINOPHEN 10; 325 MG/1; MG/1
TABLET ORAL
Refills: 0 | COMMUNITY
Start: 2018-03-05 | End: 2018-04-17

## 2018-03-08 RX ORDER — DOCUSATE SODIUM 100 MG/1
CAPSULE, LIQUID FILLED ORAL
Refills: 0 | COMMUNITY
Start: 2018-03-05 | End: 2018-04-17

## 2018-03-08 RX ORDER — IBUPROFEN 800 MG/1
TABLET, FILM COATED ORAL
Refills: 1 | COMMUNITY
Start: 2018-03-05 | End: 2018-04-17

## 2018-03-08 ASSESSMENT — PAIN SCALES - GENERAL: PAINLEVEL: MILD PAIN (3)

## 2018-03-08 NOTE — PATIENT INSTRUCTIONS
"BREASTFEEDING TIPS  1. Breastfeed every 2-4 hours. If your baby is sleepy - use breast compression, push on chin to \"start up\" baby, switch breasts, undress to diaper and wake before relatching.   Some babies \"cluster\" feed every 1 hour for a while- this is normal. Feed your baby whenever he/she is awake-  even if every hour for a while. This frequent feeding will help you make more milk and encourage your baby to sleep for longer stretches later in the evening or night.    - Position your baby close to you with pillows so he/she is facing you -belly to belly laying horizontally across your lap at the level of your breast and looking a bit \"upwards\" to your breast   -One hand holds the baby's neck behind the ears and the other hand holds your breast  -Baby's nose should start out pointing to your nipple before latching  - Hold your breast in a \"sandwich\" position by gently squeezing your breast in an oval shape and make sure your hands are not covering the areola  This \"nipple sandwich\" will make it easier for your breast to fit inside the baby's mouth-making latching more comfortable for you and baby and preventing sore nipples. Your baby should take a \"mouthful\" of breast!  - You may want to use hand expression to \"prime the pump\" and get a drip of milk out on your nipple to wake baby   (see website: newborns.[employer].edu/Breastfeeding/HandExpression.html)  - Swipe your nipple on baby's upper lip and wait for a BIG open mouth  - YOU bring baby to the breast (hold baby's neck with your fingers just below the ears) and bring baby's head to the breast--leading with the chin.  Try to avoid pushing your breast into baby's mouth- bring baby to you instead!  - Aim to get your baby's bottom lip LOW DOWN ON AREOLA (baby's upper lip just needs to \"clear\" the nipple) .   Your baby should latch onto the areola and NOT just the nipple. That way your baby gets more milk and you don't get sore nipples!      Useful web " sites:  Www.infantrisk.com  Www.aap.org  Www.ibreastfeeding.com  Www.health.[facility].mn.us

## 2018-03-08 NOTE — NURSING NOTE
"Chief Complaint   Patient presents with     Lactation Consult       Initial /60  Pulse 89  Ht 5' 5\" (1.651 m)  Wt 177 lb (80.3 kg)  LMP 06/12/2017 (Exact Date)  SpO2 97%  BMI 29.45 kg/m2 Estimated body mass index is 29.45 kg/(m^2) as calculated from the following:    Height as of this encounter: 5' 5\" (1.651 m).    Weight as of this encounter: 177 lb (80.3 kg).  Medication Reconciliation: complete     Laquita Pacheco      "

## 2018-03-08 NOTE — PROGRESS NOTES
Consultation Date: 3/8/2018    Reason for Lactation Referral:routine lactation assessment.    MATERNAL HISTORY   Maternal History: AMA, CS delivery  History of Breast Surgery: No  Breast Changes During Pregnancy: Yes  Breast Feeding History: Yes,  successful, Length of Time: 1 month  Maternal Meds: see list    MATERNAL ASSESSMENT    Breast Size: average  Nipple Appearance - Left: intact  Nipple Appearance - Right: intact  Nipple Erectility - Left: erect with stimulation  Nipple Erectility - Right: erect with stimulation  Areolas Compressibility: soft and pliable  Nipple Size: average  Milk Supply: transitional    INFANT HISTORY & ASSESSMENT    Weight  Birth weight: 7 lb 3 oz  Discharge weight: Weight: 6 lb 11 oz      Oral Anatomy  Mouth: normal  Palate: normal  Jaw: normal  Tongue: normal  Frenulum: normal  Digital Suck Exam: root    FEEDING   Feeding Time:15 min  Position: modified cradle  Effort to Latch: awake and alert, latched easily    Results: good breast feed  Volume of Intake:    Pre-Weight: 6 lb 11.5    Post-Weight: 6 lb 12    Total Intake: 0.5    FEEDING PLAN    Home Feeding Plan: continue feeding on demand.  Monitor latch.  Follow as below.    LACTATION RECOMMENDATIONS AND COMMENTS     Continue to feed 8-12 times per 24 hours, Monitor positioning and latch, Follow up appointment on monday with Dr. Alcantar for weight check. and Call as needed with concerns or questions     Incision clean and dry.  No discharge or drainage.  Afebrile.  Bleeding light and without clots.  Staples removed without incident.  Benzoin and steri strips applied.

## 2018-03-08 NOTE — MR AVS SNAPSHOT
"              After Visit Summary   3/8/2018    Joaquina Perez    MRN: [id number]           Patient Information     Date Of Birth          1980        Visit Information        Provider Department      3/8/2018 2:00 PM Devika Pérez NP [facility]        Today's Diagnoses     Care and examination of lactating mother    -  1    Encounter for staple removal          Care Instructions    BREASTFEEDING TIPS  1. Breastfeed every 2-4 hours. If your baby is sleepy - use breast compression, push on chin to \"start up\" baby, switch breasts, undress to diaper and wake before relatching.   Some babies \"cluster\" feed every 1 hour for a while- this is normal. Feed your baby whenever he/she is awake-  even if every hour for a while. This frequent feeding will help you make more milk and encourage your baby to sleep for longer stretches later in the evening or night.    - Position your baby close to you with pillows so he/she is facing you -belly to belly laying horizontally across your lap at the level of your breast and looking a bit \"upwards\" to your breast   -One hand holds the baby's neck behind the ears and the other hand holds your breast  -Baby's nose should start out pointing to your nipple before latching  - Hold your breast in a \"sandwich\" position by gently squeezing your breast in an oval shape and make sure your hands are not covering the areola  This \"nipple sandwich\" will make it easier for your breast to fit inside the baby's mouth-making latching more comfortable for you and baby and preventing sore nipples. Your baby should take a \"mouthful\" of breast!  - You may want to use hand expression to \"prime the pump\" and get a drip of milk out on your nipple to wake baby   (see website: newborns.[email or web address].edu/Breastfeeding/HandExpression.html)  - Swipe your nipple on baby's upper lip and wait for a BIG open mouth  - YOU bring baby to the breast (hold baby's neck with your fingers just below the " "ears) and bring baby's head to the breast--leading with the chin.  Try to avoid pushing your breast into baby's mouth- bring baby to you instead!  - Aim to get your baby's bottom lip LOW DOWN ON AREOLA (baby's upper lip just needs to \"clear\" the nipple) .   Your baby should latch onto the areola and NOT just the nipple. That way your baby gets more milk and you don't get sore nipples!      Useful web sites:  Www.infantrisk.com  Www.aap.org  Www.ibreastfeeding.com  Www.health.[facility].mn.us          Follow-ups after your visit        Your next 10 appointments already scheduled     Mar 12, 2018 10:30 AM CDT   (Arrive by 10:15 AM)   Post Op with Geetha Jacobs MD   [facility] [place] ([facility] )    [address]  [place] [address] [phone]              Who to contact     If you have questions or need follow up information about today's clinic visit or your schedule please contact [facility] directly at [phone].  Normal or non-critical lab and imaging results will be communicated to you by MyChart, letter or phone within 4 business days after the clinic has received the results. If you do not hear from us within 7 days, please contact the clinic through [employer] or phone. If you have a critical or abnormal lab result, we will notify you by phone as soon as possible.  Submit refill requests through [employer] or call your pharmacy and they will forward the refill request to us. Please allow 3 business days for your refill to be completed.          Additional Information About Your Visit        MyChart Information     [employer] gives you secure access to your electronic health record. If you see a primary care provider, you can also send messages to your care team and make appointments. If you have questions, please call your primary care clinic.  If you do not have a primary care provider, please call [phone] and they will assist you.        Care EveryWhere ID     " "This is your Care EveryWhere ID. This could be used by other organizations to access your [place] medical records  [id number]        Your Vitals Were     Pulse Temperature Height Last Period Pulse Oximetry BMI (Body Mass Index)    89 98.6  F (37  C) (Tympanic) 5' 5\" (1.651 m) 06/12/2017 (Exact Date) 97% 29.45 kg/m2       Blood Pressure from Last 3 Encounters:   03/08/18 100/60   03/05/18 99/60   02/27/18 100/68    Weight from Last 3 Encounters:   03/08/18 177 lb (80.3 kg)   03/02/18 186 lb (84.4 kg)   02/27/18 189 lb (85.7 kg)              Today, you had the following     No orders found for display       Primary Care Provider Office Phone # Fax #    Seymour Alcantar [phone]       [facility] [address] [place] [address]        Equal Access to Services     ENA OCONNELL AH: Hadii erika rodriguez hadasho Soomaali, waaxda luqadaha, qaybta kaalmada adeegyada, sherice gutierrez . So [facility] [phone].    ATENCIÓN: Si alirio espbishop, tiene a moody disposición servicios gratuitos de asistencia lingüística. Llame al [phone].    We comply with applicable federal civil rights laws and [place] laws. We do not discriminate on the basis of race, color, national origin, age, disability, sex, sexual orientation, or gender identity.            Thank you!     Thank you for choosing [facility]  for your care. Our goal is always to provide you with excellent care. Hearing back from our patients is one way we can continue to improve our services. Please take a few minutes to complete the written survey that you may receive in the mail after your visit with us. Thank you!             Your Updated Medication List - Protect others around you: Learn how to safely use, store and throw away your medicines at www.disposemymeds.org.          This list is accurate as of 3/8/18  2:48 PM.  Always use your most recent med list.                   Brand Name Dispense Instructions for use Diagnosis    "  MG capsule   Generic drug:  docusate sodium      TK 2 CS PO BID PRN        HYDROcodone-acetaminophen  MG per tablet    NORCO     TK 1 T PO Q 4 H PRN        ibuprofen 800 MG tablet    ADVIL/MOTRIN     TK 1 T PO Q 8 H PRN        PEPCID AC MAXIMUM STRENGTH PO      Take 10 mg by mouth        prenatal multivitamin plus iron 27-0.8 MG Tabs per tablet      Take 1 tablet by mouth daily    Family planning, Depression with anxiety       TYLENOL PO      Take 500 mg by mouth

## 2018-03-12 ENCOUNTER — OFFICE VISIT (OUTPATIENT)
Dept: OBGYN | Facility: OTHER | Age: 38
End: 2018-03-12
Attending: OBSTETRICS & GYNECOLOGY
Payer: COMMERCIAL

## 2018-03-12 ENCOUNTER — TELEPHONE (OUTPATIENT)
Dept: OBGYN | Facility: OTHER | Age: 38
End: 2018-03-12

## 2018-03-12 VITALS
HEART RATE: 60 BPM | TEMPERATURE: 98.3 F | DIASTOLIC BLOOD PRESSURE: 66 MMHG | SYSTOLIC BLOOD PRESSURE: 94 MMHG | WEIGHT: 172 LBS | BODY MASS INDEX: 28.66 KG/M2 | HEIGHT: 65 IN

## 2018-03-12 PROCEDURE — G0463 HOSPITAL OUTPT CLINIC VISIT: HCPCS

## 2018-03-12 PROCEDURE — 99024 POSTOP FOLLOW-UP VISIT: CPT | Performed by: OBSTETRICS & GYNECOLOGY

## 2018-03-12 RX ORDER — CALCIUM CARBONATE 500(1250)
1 TABLET ORAL 2 TIMES DAILY
COMMUNITY
End: 2019-09-20

## 2018-03-12 RX ORDER — OMEGA-3-ACID ETHYL ESTERS 1 G/1
2 CAPSULE, LIQUID FILLED ORAL 2 TIMES DAILY
COMMUNITY

## 2018-03-12 ASSESSMENT — ANXIETY QUESTIONNAIRES
7. FEELING AFRAID AS IF SOMETHING AWFUL MIGHT HAPPEN: SEVERAL DAYS
2. NOT BEING ABLE TO STOP OR CONTROL WORRYING: SEVERAL DAYS
GAD7 TOTAL SCORE: 2
5. BEING SO RESTLESS THAT IT IS HARD TO SIT STILL: NOT AT ALL
6. BECOMING EASILY ANNOYED OR IRRITABLE: NOT AT ALL
3. WORRYING TOO MUCH ABOUT DIFFERENT THINGS: NOT AT ALL
IF YOU CHECKED OFF ANY PROBLEMS ON THIS QUESTIONNAIRE, HOW DIFFICULT HAVE THESE PROBLEMS MADE IT FOR YOU TO DO YOUR WORK, TAKE CARE OF THINGS AT HOME, OR GET ALONG WITH OTHER PEOPLE: NOT DIFFICULT AT ALL
1. FEELING NERVOUS, ANXIOUS, OR ON EDGE: NOT AT ALL

## 2018-03-12 ASSESSMENT — PATIENT HEALTH QUESTIONNAIRE - PHQ9: 5. POOR APPETITE OR OVEREATING: NOT AT ALL

## 2018-03-12 NOTE — PATIENT INSTRUCTIONS
See Rene Hurley for lactation  Return to clinic in 5 weeks.  Return for annual exam with Rene Hurley in September.    Can return for Nexplanon/birth control as needed.

## 2018-03-12 NOTE — PROGRESS NOTES
"Joaquina Perez is a 38 year old female here after . +BM. Almost off the narcotics. Activity discussed.      O:   BP 94/66  Pulse 60  Temp 98.3  F (36.8  C)  Ht 5' 5\" (1.651 m)  Wt 172 lb (78 kg)  LMP 2017 (Exact Date)  BMI 28.62 kg/m2   Healing well with some minimal misalignment  Bumps examined. Not scary    A:  Family planning    P:  Nexplanon prn  RE for lactation  rto for 6 wk exam  Rosas number for vas  RE annual September    Greater than 25 minutes were spent face to face counseling this patient    Geetha Jacobs MD    "

## 2018-03-12 NOTE — MR AVS SNAPSHOT
After Visit Summary   3/12/2018    Joaquina Perez    MRN: [id number]           Patient Information     Date Of Birth          1980        Visit Information        Provider Department      3/12/2018 10:30 AM Geetha Jacobs MD [facility]        Today's Diagnoses     Delivered by  section    -  1      Care Instructions    See Rene Hurley for lactation  Return to clinic in 5 weeks.  Return for annual exam with Rene Hurley in September.    Can return for Nexplanon/birth control as needed.            Follow-ups after your visit        Who to contact     If you have questions or need follow up information about today's clinic visit or your schedule please contact [facility] directly at [phone].  Normal or non-critical lab and imaging results will be communicated to you by [employer], letter or phone within 4 business days after the clinic has received the results. If you do not hear from us within 7 days, please contact the clinic through [employer] or phone. If you have a critical or abnormal lab result, we will notify you by phone as soon as possible.  Submit refill requests through [employer] or call your pharmacy and they will forward the refill request to us. Please allow 3 business days for your refill to be completed.          Additional Information About Your Visit        MyChart Information     [employer] gives you secure access to your electronic health record. If you see a primary care provider, you can also send messages to your care team and make appointments. If you have questions, please call your primary care clinic.  If you do not have a primary care provider, please call [phone] and they will assist you.        Care EveryWhere ID     This is your Care EveryWhere ID. This could be used by other organizations to access your [place] medical records  [id number]        Your Vitals Were     Pulse Temperature Height Last Period BMI (Body Mass  "Index)       60 98.3  F (36.8  C) 5' 5\" (1.651 m) 06/12/2017 (Exact Date) 28.62 kg/m2        Blood Pressure from Last 3 Encounters:   03/12/18 94/66   03/08/18 100/60   03/05/18 99/60    Weight from Last 3 Encounters:   03/12/18 172 lb (78 kg)   03/08/18 177 lb (80.3 kg)   03/02/18 186 lb (84.4 kg)              Today, you had the following     No orders found for display       Primary Care Provider Office Phone # Fax #    Seymour Alcantar [phone]       [facility] [address]        Equal Access to Services     [facility] ANISH : Killian Arvizu, waaxda luqadaha, qaybta kaalmada adeegyada, sherice gutierrez . So [facility] [phone].    ATENCIÓN: Si habla español, tiene a moody disposición servicios gratuitos de asistencia lingüística. [facility] [phone].    We comply with applicable federal civil rights laws and [place] laws. We do not discriminate on the basis of race, color, national origin, age, disability, sex, sexual orientation, or gender identity.            Thank you!     Thank you for choosing [facility]  for your care. Our goal is always to provide you with excellent care. Hearing back from our patients is one way we can continue to improve our services. Please take a few minutes to complete the written survey that you may receive in the mail after your visit with us. Thank you!             Your Updated Medication List - Protect others around you: Learn how to safely use, store and throw away your medicines at www.disposemymeds.org.          This list is accurate as of 3/12/18 11:13 AM.  Always use your most recent med list.                   Brand Name Dispense Instructions for use Diagnosis    calcium carbonate 1250 MG tablet    OS-TANIA 500 mg [place]. [place]     Take 1 tablet by mouth 2 times daily         MG capsule   Generic drug:  docusate sodium      TK 2 CS PO BID PRN        HYDROcodone-acetaminophen  MG per " tablet    NORCO     TK 1 T PO Q 4 H PRN        ibuprofen 800 MG tablet    ADVIL/MOTRIN     TK 1 T PO Q 8 H PRN        omega-3 acid ethyl esters 1 G capsule    Lovaza     Take 2 g by mouth 2 times daily        prenatal multivitamin plus iron 27-0.8 MG Tabs per tablet      Take 1 tablet by mouth daily    Family planning, Depression with anxiety       TYLENOL PO      Take 500 mg by mouth        VITAMIN D (CHOLECALCIFEROL) PO      Take 1,000 Units by mouth daily

## 2018-03-12 NOTE — TELEPHONE ENCOUNTER
Delivered via c/section 3/2/18. Forgot to ask at appt today if it is ok to take bath in tub and submerge incision at this time.

## 2018-03-12 NOTE — NURSING NOTE
"Chief Complaint   Patient presents with     Surgical Followup       Initial BP 94/66  Pulse 60  Temp 98.3  F (36.8  C)  Ht 5' 5\" (1.651 m)  Wt 172 lb (78 kg)  LMP 06/12/2017 (Exact Date)  BMI 28.62 kg/m2 Estimated body mass index is 28.62 kg/(m^2) as calculated from the following:    Height as of this encounter: 5' 5\" (1.651 m).    Weight as of this encounter: 172 lb (78 kg).  Medication Reconciliation: complete   Nelsy Peterson      "

## 2018-03-13 ASSESSMENT — ANXIETY QUESTIONNAIRES: GAD7 TOTAL SCORE: 2

## 2018-03-13 ASSESSMENT — PATIENT HEALTH QUESTIONNAIRE - PHQ9: SUM OF ALL RESPONSES TO PHQ QUESTIONS 1-9: 0

## 2018-04-04 ENCOUNTER — OFFICE VISIT (OUTPATIENT)
Dept: CHIROPRACTIC MEDICINE | Facility: OTHER | Age: 38
End: 2018-04-04
Attending: CHIROPRACTOR
Payer: COMMERCIAL

## 2018-04-04 DIAGNOSIS — M54.50 ACUTE BILATERAL LOW BACK PAIN WITHOUT SCIATICA: ICD-10-CM

## 2018-04-04 DIAGNOSIS — M99.03 SEGMENTAL AND SOMATIC DYSFUNCTION OF LUMBAR REGION: ICD-10-CM

## 2018-04-04 DIAGNOSIS — M99.02 SEGMENTAL AND SOMATIC DYSFUNCTION OF THORACIC REGION: ICD-10-CM

## 2018-04-04 DIAGNOSIS — M99.01 SEGMENTAL AND SOMATIC DYSFUNCTION OF CERVICAL REGION: ICD-10-CM

## 2018-04-04 PROCEDURE — 98941 CHIROPRACT MANJ 3-4 REGIONS: CPT | Mod: AT | Performed by: CHIROPRACTOR

## 2018-04-04 PROCEDURE — 99201 ZZC OFFICE/OUTPT VISIT, NEW, LEVEL I: CPT | Mod: 25 | Performed by: CHIROPRACTOR

## 2018-04-04 NOTE — PROGRESS NOTES
Subjective Finding:    Chief compalint: Patient presents with:  Back Pain: L45 disc involvemnt  Neck Pain  , Pain Scale: 6/10, Intensity: sharp, Duration: 1 years, Radiating: no.    Date of injury:     Activities that the pain restricts:   Home/household/hobbies/social activities: yes.  Work duties: yes.  Sleep: yes.  Makes symptoms better: rest.  Makes symptoms worse: sitting.  Have you seen anyone else for the symptoms? Yes: MD and GEORGETTE.  Work related: no.  Automobile related injury: no.    Objective and Assessment:    Posture Analysis:   High shoulder: .  Head tilt: .  High iliac crest: .  Head carriage: neutral.  Thoracic Kyphosis: forward.  Lumbar Lordosis: forward.    Lumbar Range of Motion: extension decreased.  Cervical Range of Motion: extension decreased.  Thoracic Range of Motion: extension decreased.  Extremity Range of Motion: .    Palpation:   Quad lumb: bilateral, referred pain: no  Traps: sharp pain, referred pain: no    Segmental dysfunction pre-treatment and treatment area: C4, T7, L3, L4 and L5.    Assessment post-treatment:  Cervical: ROM increased.  Thoracic: ROM increased.  Lumbar: ROM increased.    Comments: DDd of lumbar area.      Complicating Factors: structural abnormalities.    Procedure(s):  CMT:  03530 Chiropractic manipulative treatment 3-4 regions performed   Cervical: Diversified, See above for level, Supine, Thoracic: Diversified, See above for level, Prone and Lumbar: Diversified, See above for level, Side posture    Modalities:  None performed this visit    Therapeutic procedures:  None    Plan:  Treatment plan: PRN.  Instructed patient: stretch as instructed at visit.  Short term goals: increase ROM.  Long term goals: pain releif.  Prognosis: very good.

## 2018-04-04 NOTE — MR AVS SNAPSHOT
After Visit Summary   4/4/2018    Joaquina Perez    MRN: [id number]           Patient Information     Date Of Birth          1980        Visit Information        Provider Department      4/4/2018 1:10 PM Adam Smyth DC  [facility] [place]        Today's Diagnoses     Acute bilateral low back pain without sciatica        Segmental and somatic dysfunction of lumbar region        Segmental and somatic dysfunction of thoracic region        Segmental and somatic dysfunction of cervical region           Follow-ups after your visit        Your next 10 appointments already scheduled     Apr 17, 2018 10:20 AM CDT   (Arrive by 10:05 AM)   Post Partum with [facility] MD   [facility] [place] )    [address]  [place] [address] [phone]            Sep 12, 2018 10:30 AM CDT   (Arrive by 10:15 AM)   PHYSICAL with MANASA Barriga CNM   [facility] ([facility] [place] )    [address]  [place] [address]   [phone]              Who to contact     If you have questions or need follow up information about today's clinic visit or your schedule please contact  [facility] directly at [phone].  Normal or non-critical lab and imaging results will be communicated to you by MyChart, letter or phone within 4 business days after the clinic has received the results. If you do not hear from us within 7 days, please contact the clinic through MyChart or phone. If you have a critical or abnormal lab result, we will notify you by phone as soon as possible.  Submit refill requests through [employer] or call your pharmacy and they will forward the refill request to us. Please allow 3 business days for your refill to be completed.          Additional Information About Your Visit        [employer] Information     [employer] gives you secure access to your electronic health record. If you see a primary care provider,  you can also send messages to your care team and make appointments. If you have questions, please call your primary care clinic.  If you do not have a primary care provider, please call [phone] and they will assist you.        Care EveryWhere ID     This is your Care EveryWhere ID. This could be used by other organizations to access your [place] medical records  [id number]        Your Vitals Were     Last Period                   06/12/2017 (Exact Date)            Blood Pressure from Last 3 Encounters:   03/12/18 94/66   03/08/18 100/60   03/05/18 99/60    Weight from Last 3 Encounters:   03/12/18 172 lb (78 kg)   03/08/18 177 lb (80.3 kg)   03/02/18 186 lb (84.4 kg)              Today, you had the following     No orders found for display       Primary Care Provider Office Phone # Fax #    Seymour Alcantar [phone]       [facility] [place] [id number]        Equal Access to Services     [facility] AH: Hadii aad ku hadasho Soomaali, waaxda luqadaha, qaybta kaalmada adeegyada, waxay idiin haymorgann anna gutierrez . So [facility] [phone].    ATENCIÓN: Si habla español, tiene a moody disposición servicios gratuitos de asistencia lingüística. [facility] [phone].    We comply with applicable federal civil rights laws and [place] laws. We do not discriminate on the basis of race, color, national origin, age, disability, sex, sexual orientation, or gender identity.            Thank you!     Thank you for choosing  [facility]  for your care. Our goal is always to provide you with excellent care. Hearing back from our patients is one way we can continue to improve our services. Please take a few minutes to complete the written survey that you may receive in the mail after your visit with us. Thank you!             Your Updated Medication List - Protect others around you: Learn how to safely use, store and throw away your medicines at www.disposemymeds.org.          This list  is accurate as of 4/4/18  2:03 PM.  Always use your most recent med list.                   Brand Name Dispense Instructions for use Diagnosis    calcium carbonate 1250 MG tablet    OS-TANIA 500 mg [place]. [place]     Take 1 tablet by mouth 2 times daily         MG capsule   Generic drug:  docusate sodium      TK 2 CS PO BID PRN        HYDROcodone-acetaminophen  MG per tablet    NORCO     TK 1 T PO Q 4 H PRN        ibuprofen 800 MG tablet    ADVIL/MOTRIN     TK 1 T PO Q 8 H PRN        omega-3 acid ethyl esters 1 G capsule    Lovaza     Take 2 g by mouth 2 times daily        prenatal multivitamin plus iron 27-0.8 MG Tabs per tablet      Take 1 tablet by mouth daily    Family planning, Depression with anxiety       TYLENOL PO      Take 500 mg by mouth        VITAMIN D (CHOLECALCIFEROL) PO      Take 1,000 Units by mouth daily

## 2018-04-09 ENCOUNTER — OFFICE VISIT (OUTPATIENT)
Dept: CHIROPRACTIC MEDICINE | Facility: OTHER | Age: 38
End: 2018-04-09
Attending: CHIROPRACTOR
Payer: COMMERCIAL

## 2018-04-09 DIAGNOSIS — M99.01 SEGMENTAL AND SOMATIC DYSFUNCTION OF CERVICAL REGION: ICD-10-CM

## 2018-04-09 DIAGNOSIS — M99.03 SEGMENTAL AND SOMATIC DYSFUNCTION OF LUMBAR REGION: Primary | ICD-10-CM

## 2018-04-09 DIAGNOSIS — M54.50 ACUTE BILATERAL LOW BACK PAIN WITHOUT SCIATICA: ICD-10-CM

## 2018-04-09 DIAGNOSIS — M99.02 SEGMENTAL AND SOMATIC DYSFUNCTION OF THORACIC REGION: ICD-10-CM

## 2018-04-09 PROCEDURE — 98941 CHIROPRACT MANJ 3-4 REGIONS: CPT | Mod: AT | Performed by: CHIROPRACTOR

## 2018-04-09 NOTE — MR AVS SNAPSHOT
After Visit Summary   4/9/2018    Joaquina Perez    MRN: [id number]           Patient Information     Date Of Birth          1980        Visit Information        Provider Department      4/9/2018 1:40 PM Adam Smyth DC  [facility]        Today's Diagnoses     Segmental and somatic dysfunction of lumbar region    -  1    Acute bilateral low back pain without sciatica        Segmental and somatic dysfunction of thoracic region        Segmental and somatic dysfunction of cervical region           Follow-ups after your visit        Your next 10 appointments already scheduled     Apr 17, 2018 10:20 AM CDT   (Arrive by 10:05 AM)   Post Partum with Geetha Jacobs MD   [facility] ([facility] [place] )    [address]  [place] [address]   [phone]            Sep 12, 2018 10:30 AM CDT   (Arrive by 10:15 AM)   PHYSICAL with MANASA Barriga CNM   [facility] ([facility] [place] )    [address]  [place] [address]   [phone]              Who to contact     If you have questions or need follow up information about today's clinic visit or your schedule please contact  [facility] directly at [phone].  Normal or non-critical lab and imaging results will be communicated to you by MyChart, letter or phone within 4 business days after the clinic has received the results. If you do not hear from us within 7 days, please contact the clinic through MyChart or phone. If you have a critical or abnormal lab result, we will notify you by phone as soon as possible.  Submit refill requests through Mape or call your pharmacy and they will forward the refill request to us. Please allow 3 business days for your refill to be completed.          Additional Information About Your Visit        MyChart Information     Mape gives you secure access to your electronic health record. If you see a primary care  provider, you can also send messages to your care team and make appointments. If you have questions, please call your primary care clinic.  If you do not have a primary care provider, please call [phone] and they will assist you.        Care EveryWhere ID     This is your Care EveryWhere ID. This could be used by other organizations to access your [place] medical records  [id number]        Your Vitals Were     Last Period                   06/12/2017 (Exact Date)            Blood Pressure from Last 3 Encounters:   03/12/18 94/66   03/08/18 100/60   03/05/18 99/60    Weight from Last 3 Encounters:   03/12/18 172 lb (78 kg)   03/08/18 177 lb (80.3 kg)   03/02/18 186 lb (84.4 kg)              We Performed the Following     CHIROPRAC MANIP,SPINAL,3-4 REGIONS        Primary Care Provider Office Phone # Fax #    Seymour Alcantar [phone] [address] [id number]        Equal Access to Services     ERIC OCONNELL : Hadii aad ku hadasho Soomaali, waaxda luqadaha, qaybta kaalmada adeegyada, waxay amandain haysamira gutierrez . So [facility] [phone].    ATENCIÓN: Si habla español, tiene a moody disposición servicios gratuitos de asistencia lingüística. [facility] [phone].    We comply with applicable federal civil rights laws and [place] laws. We do not discriminate on the basis of race, color, national origin, age, disability, sex, sexual orientation, or gender identity.            Thank you!     Thank you for choosing  [facility]  for your care. Our goal is always to provide you with excellent care. Hearing back from our patients is one way we can continue to improve our services. Please take a few minutes to complete the written survey that you may receive in the mail after your visit with us. Thank you!             Your Updated Medication List - Protect others around you: Learn how to safely use, store and throw away your medicines at www.disposemymeds.org.           This list is accurate as of 4/9/18  3:10 PM.  Always use your most recent med list.                   Brand Name Dispense Instructions for use Diagnosis    calcium carbonate 1250 MG tablet    OS-TANIA 500 mg [place]. Ca     Take 1 tablet by mouth 2 times daily         MG capsule   Generic drug:  docusate sodium      TK 2 CS PO BID PRN        HYDROcodone-acetaminophen  MG per tablet    NORCO     TK 1 T PO Q 4 H PRN        ibuprofen 800 MG tablet    ADVIL/MOTRIN     TK 1 T PO Q 8 H PRN        omega-3 acid ethyl esters 1 G capsule    Lovaza     Take 2 g by mouth 2 times daily        prenatal multivitamin plus iron 27-0.8 MG Tabs per tablet      Take 1 tablet by mouth daily    Family planning, Depression with anxiety       TYLENOL PO      Take 500 mg by mouth        VITAMIN D (CHOLECALCIFEROL) PO      Take 1,000 Units by mouth daily

## 2018-04-09 NOTE — PROGRESS NOTES
Subjective Finding:    Chief compalint: Patient presents with:  Back Pain  Neck Pain  , Pain Scale: 6/10, Intensity: sharp, Duration: 1 years, Radiating: no.    Date of injury:     Activities that the pain restricts:   Home/household/hobbies/social activities: yes.  Work duties: yes.  Sleep: yes.  Makes symptoms better: rest.  Makes symptoms worse: sitting.  Have you seen anyone else for the symptoms? Yes: MD and GEORGETTE.  Work related: no.  Automobile related injury: no.    Objective and Assessment:    Posture Analysis:   High shoulder: .  Head tilt: .  High iliac crest: .  Head carriage: neutral.  Thoracic Kyphosis: forward.  Lumbar Lordosis: forward.    Lumbar Range of Motion: extension decreased.  Cervical Range of Motion: extension decreased.  Thoracic Range of Motion: extension decreased.  Extremity Range of Motion: .    Palpation:   Quad lumb: bilateral, referred pain: no  Traps: sharp pain, referred pain: no    Segmental dysfunction pre-treatment and treatment area: C4, T7, L3, L4 and L5.    Assessment post-treatment:  Cervical: ROM increased.  Thoracic: ROM increased.  Lumbar: ROM increased.    Comments: DDd of lumbar area.      Complicating Factors: structural abnormalities.    Procedure(s):  CMT:  20962 Chiropractic manipulative treatment 3-4 regions performed   Cervical: Diversified, See above for level, Supine, Thoracic: Diversified, See above for level, Prone and Lumbar: Diversified, See above for level, Side posture    Modalities:  None performed this visit    Therapeutic procedures:  None    Plan:  Treatment plan: PRN.  Instructed patient: stretch as instructed at visit.  Short term goals: increase ROM.  Long term goals: pain releif.  Prognosis: very good.

## 2018-04-16 ENCOUNTER — OFFICE VISIT (OUTPATIENT)
Dept: CHIROPRACTIC MEDICINE | Facility: OTHER | Age: 38
End: 2018-04-16
Attending: CHIROPRACTOR
Payer: COMMERCIAL

## 2018-04-16 DIAGNOSIS — M99.02 SEGMENTAL AND SOMATIC DYSFUNCTION OF THORACIC REGION: ICD-10-CM

## 2018-04-16 DIAGNOSIS — M54.50 ACUTE BILATERAL LOW BACK PAIN WITHOUT SCIATICA: ICD-10-CM

## 2018-04-16 DIAGNOSIS — M99.03 SEGMENTAL AND SOMATIC DYSFUNCTION OF LUMBAR REGION: Primary | ICD-10-CM

## 2018-04-16 PROCEDURE — 98941 CHIROPRACT MANJ 3-4 REGIONS: CPT | Mod: AT | Performed by: CHIROPRACTOR

## 2018-04-16 NOTE — MR AVS SNAPSHOT
After Visit Summary   4/16/2018    Joaquina Perez    MRN: [id number]           Patient Information     Date Of Birth          1980        Visit Information        Provider Department      4/16/2018 1:50 PM Adam Smtyh DC  [facility]        Today's Diagnoses     Segmental and somatic dysfunction of lumbar region    -  1    Acute bilateral low back pain without sciatica        Segmental and somatic dysfunction of thoracic region           Follow-ups after your visit        Your next 10 appointments already scheduled     Apr 17, 2018 10:20 AM CDT   (Arrive by 10:05 AM)   Post Partum with Geetha Jacobs MD   [facility] [place] [facility]    [address] [facility] [place] [address]   [phone]            Sep 12, 2018 10:30 AM CDT   (Arrive by 10:15 AM)   PHYSICAL with MANASA Barriga LUIS ALFREDO   [facility] ([facility] [place] )    [address]  [place] [address]   [phone]              Who to contact     If you have questions or need follow up information about today's clinic visit or your schedule please contact  [facility] directly at [phone].  Normal or non-critical lab and imaging results will be communicated to you by MyChart, letter or phone within 4 business days after the clinic has received the results. If you do not hear from us within 7 days, please contact the clinic through MyChart or phone. If you have a critical or abnormal lab result, we will notify you by phone as soon as possible.  Submit refill requests through [employer] or call your pharmacy and they will forward the refill request to us. Please allow 3 business days for your refill to be completed.          Additional Information About Your Visit        MyChart Information     [employer] gives you secure access to your electronic health record. If you see a primary care provider, you can also send messages to your care team and make  appointments. If you have questions, please call your primary care clinic.  If you do not have a primary care provider, please call [phone] and they will assist you.        Care EveryWhere ID     This is your Care EveryWhere ID. This could be used by other organizations to access your [place] medical records  [id number]        Your Vitals Were     Last Period                   06/12/2017 (Exact Date)            Blood Pressure from Last 3 Encounters:   03/12/18 94/66   03/08/18 100/60   03/05/18 99/60    Weight from Last 3 Encounters:   03/12/18 172 lb (78 kg)   03/08/18 177 lb (80.3 kg)   03/02/18 186 lb (84.4 kg)              We Performed the Following     CHIROPRAC MANIP,SPINAL,1-2 REGIONS        Primary Care Provider Office Phone # Fax #    Seymour Alcantar [phone]       [facility] [address]        Equal Access to Services     ENA OCONNELL AH: Hadii erika ku hadasho Soomaali, waaxda luqadaha, qaybta kaalmada adeegyada, waxay melanie gutierrez . So [facility] [phone].    ATENCIÓN: Si alirio espbishop, tiene a moody disposición servicios gratuitos de asistencia lingüística. Llame al [phone].    We comply with applicable federal civil rights laws and [place] laws. We do not discriminate on the basis of race, color, national origin, age, disability, sex, sexual orientation, or gender identity.            Thank you!     Thank you for choosing  [facility]  for your care. Our goal is always to provide you with excellent care. Hearing back from our patients is one way we can continue to improve our services. Please take a few minutes to complete the written survey that you may receive in the mail after your visit with us. Thank you!             Your Updated Medication List - Protect others around you: Learn how to safely use, store and throw away your medicines at www.disposemymeds.org.          This list is accurate as of 4/16/18 11:59 PM.  Always use  your most recent med list.                   Brand Name Dispense Instructions for use Diagnosis    calcium carbonate 1250 MG tablet    OS-TANIA 500 mg [place]. Ca     Take 1 tablet by mouth 2 times daily         MG capsule   Generic drug:  docusate sodium      TK 2 CS PO BID PRN        HYDROcodone-acetaminophen  MG per tablet    NORCO     TK 1 T PO Q 4 H PRN        ibuprofen 800 MG tablet    ADVIL/MOTRIN     TK 1 T PO Q 8 H PRN        omega-3 acid ethyl esters 1 g capsule    Lovaza     Take 2 g by mouth 2 times daily        prenatal multivitamin plus iron 27-0.8 MG Tabs per tablet      Take 1 tablet by mouth daily    Family planning, Depression with anxiety       TYLENOL PO      Take 500 mg by mouth        VITAMIN D (CHOLECALCIFEROL) PO      Take 1,000 Units by mouth daily

## 2018-04-17 ENCOUNTER — PRENATAL OFFICE VISIT (OUTPATIENT)
Dept: OBGYN | Facility: OTHER | Age: 38
End: 2018-04-17
Attending: OBSTETRICS & GYNECOLOGY
Payer: COMMERCIAL

## 2018-04-17 VITALS
WEIGHT: 166 LBS | BODY MASS INDEX: 27.66 KG/M2 | SYSTOLIC BLOOD PRESSURE: 90 MMHG | HEART RATE: 64 BPM | DIASTOLIC BLOOD PRESSURE: 64 MMHG | HEIGHT: 65 IN

## 2018-04-17 DIAGNOSIS — Z30.09 FAMILY PLANNING: Primary | ICD-10-CM

## 2018-04-17 PROCEDURE — 11981 INSERTION DRUG DLVR IMPLANT: CPT | Performed by: OBSTETRICS & GYNECOLOGY

## 2018-04-17 PROCEDURE — 99207 ZZC POST PARTUM EXAM: CPT | Mod: 25 | Performed by: OBSTETRICS & GYNECOLOGY

## 2018-04-17 PROCEDURE — G0463 HOSPITAL OUTPT CLINIC VISIT: HCPCS | Mod: 25

## 2018-04-17 ASSESSMENT — ANXIETY QUESTIONNAIRES
1. FEELING NERVOUS, ANXIOUS, OR ON EDGE: SEVERAL DAYS
IF YOU CHECKED OFF ANY PROBLEMS ON THIS QUESTIONNAIRE, HOW DIFFICULT HAVE THESE PROBLEMS MADE IT FOR YOU TO DO YOUR WORK, TAKE CARE OF THINGS AT HOME, OR GET ALONG WITH OTHER PEOPLE: NOT DIFFICULT AT ALL
3. WORRYING TOO MUCH ABOUT DIFFERENT THINGS: NOT AT ALL
7. FEELING AFRAID AS IF SOMETHING AWFUL MIGHT HAPPEN: SEVERAL DAYS
5. BEING SO RESTLESS THAT IT IS HARD TO SIT STILL: NOT AT ALL
2. NOT BEING ABLE TO STOP OR CONTROL WORRYING: NOT AT ALL
6. BECOMING EASILY ANNOYED OR IRRITABLE: SEVERAL DAYS
GAD7 TOTAL SCORE: 3

## 2018-04-17 ASSESSMENT — PAIN SCALES - GENERAL: PAINLEVEL: NO PAIN (0)

## 2018-04-17 ASSESSMENT — PATIENT HEALTH QUESTIONNAIRE - PHQ9: 5. POOR APPETITE OR OVEREATING: NOT AT ALL

## 2018-04-17 NOTE — PROGRESS NOTES
"Joaquina Perez is a 38 year old female is 6 weeks post partum  BP 90/64  Pulse 64  Ht 5' 5\" (1.651 m)  Wt 166 lb (75.3 kg)  LMP 2017 (Exact Date)  BMI 27.62 kg/m2  Generally well  Breast feeding:  n        Baby name: fina   Spontaneous vaginal delivery: n  Stitches: c/s   PHQ9:  0  Bleeding:  stopped  Vulva:  kegels  Family planning:  nexplanon  Other concerns:  n  CC: nexplanon insertion    HPI: Joaquina Perez is a 38 year old   who is here for nexplanon insertion.  She is currently using abstinence for contraception but would like some more long term with less maintenance.  We have discussed options for long term reversible contraception including nexplanon, depo provera, IUD and she has chosen nexplanon.  She is otherwise without complaints.  Patient's last menstrual period was 2017 (exact date)..    ROS:  CONSTITUTIONAL: NEGATIVE for fever, chills, change in weight  RESP: NEGATIVE for significant cough or SOB  CV: NEGATIVE for chest pain, palpitations or peripheral edema  GI: NEGATIVE for nausea, abdominal pain, heartburn, or change in bowel habits  : NEGATIVE for frequency, dysuria, hematuria, vaginal discharge  PSYCHIATRIC: NEGATIVE for changes in mood or affect      Past Medical History:   Diagnosis Date     ACUTE STRESS REACT NOS 2004       Past Surgical History:   Procedure Laterality Date     C REPAIR CRUCIATE LIGAMENT,KNEE      left knee      SECTION  2009      SECTION N/A 3/2/2018    Procedure:  SECTION;;  Surgeon: Christopher Paez MD;  Location: [facility]      ARTHROTOMY W/OPEN MENISCUS REPAIR      left knee          Family History   Problem Relation Age of Onset     Family History Negative Mother      Thyroid Disease Mother      Family History Negative Father           Allergies   Allergen Reactions     No Known Drug Allergies        Current Outpatient Prescriptions   Medication Sig Dispense Refill     omega-3 acid ethyl " "esters (LOVAZA) 1 G capsule Take 2 g by mouth 2 times daily       VITAMIN D, CHOLECALCIFEROL, PO Take 1,000 Units by mouth daily       calcium carbonate (OS-TANIA 500 MG [place]. CA) 1250 MG tablet Take 1 tablet by mouth 2 times daily       Acetaminophen (TYLENOL PO) Take 500 mg by mouth       Prenatal Vit-Fe Fumarate-FA (PRENATAL MULTIVITAMIN  PLUS IRON) 27-0.8 MG TABS Take 1 tablet by mouth daily         Social History     Social History     Marital status:      Spouse name: N/A     Number of children: 1     Years of education: N/A     Occupational History     Not on file.     Social History Main Topics     Smoking status: Former Smoker     Packs/day: 0.50     Years: 5.00     Types: Cigarettes     Quit date: 5/16/2004     Smokeless tobacco: Never Used      Comment: Quit ~ March 1, 2009     Alcohol use Yes      Comment: occasional glass of wine     Drug use: No     Sexual activity: Yes     Partners: Male     Birth control/ protection: Pill     Other Topics Concern     Parent/Sibling W/ Cabg, Mi Or Angioplasty Before 65f 55m? No     Social History Narrative    Dairy/d 1 servings/d.     Caffeine 1 servings/d    Exercise 1 x week    Sunscreen used - Yes    Seatbelts used - Yes    Working smoke/CO detectors in the home - Yes    Guns stored in the home - No    Self Breast Exams - Yes    Self Testicular Exam - NA    Eye Exam up to date - Yes    Dental Exam up to date - Yes    Pap Smear up to date - Yes    Mammogram up to date - No    PSA up to date - NA    Dexa Scan up to date - No    Flex Sig / Colonoscopy up to date - No    Immunizations up to date - Yes    Abuse: Current or Past(Physical, Sexual or Emotional)- No    Do you feel safe in your environment - Yes    Andrew Devries MA    7/23/07               BP 90/64  Pulse 64  Ht 5' 5\" (1.651 m)  Wt 166 lb (75.3 kg)  LMP 06/12/2017 (Exact Date)  BMI 27.62 kg/m2    General: Healthy appearing female in no acute distress.  Ext: no cyanosis/clubbing/edema.  Left arm " without abnormalities.    Procedure:  After informed consent was obtained, the patient was positioned on the exam table with the  left  arm extended and elbow bent at 90 degree angle.  The biceps grove was identified and a abigail was placed 8-10cm from the medial epicondyle of the humerus.  A second abigail was placed a few cm past the original abigail as a guide.  The insertion site was then swabbed with betadine x 3.  5cc of 1% lidocaine was injected along the insertion tract.  Next the nexplanon was inserted without difficulty in the recommended fashion.  The device was removed and the implant was both visualized and palpated by myself and the patient.  A small amount of bleeding was noted at the insertion site.  A gauze and pressure wrap was applied to the insertion site.  She tolerated the procedure well    Assessment/Plan  1) Contraception, successful nexplanon insertion   We discussed signs/symptoms of infection and when to call.  We again reviewed potential side effects including irregular bleeding.  She is to call with any questions.  Otherwise, return to clinic prn.    Geetha Jacobs MD    15 minutes of this 30 minute visit was spent on face to face counseling about the nexplanon, the risks/benefits, side effects and placement.               Pelvic:  Vagina and vulva are normal; well healed, no discharge is noted.    Cervix: normal without lesions.    Uterus:   mobile,  normal in size and shape without tenderness.  Adnexa: without masses or tenderness.    Assessment:  Postpartum exam and family planning    Plan:   rto annual with RE  Released to work without restrictions note given.     Greater than 15 minutes were spent with this patient in addition to the procedure  Geetha Jacobs MD

## 2018-04-17 NOTE — NURSING NOTE
"Chief Complaint   Patient presents with     Post Partum Exam       Initial BP 90/64  Pulse 64  Ht 5' 5\" (1.651 m)  Wt 166 lb (75.3 kg)  LMP 06/12/2017 (Exact Date)  BMI 27.62 kg/m2 Estimated body mass index is 27.62 kg/(m^2) as calculated from the following:    Height as of this encounter: 5' 5\" (1.651 m).    Weight as of this encounter: 166 lb (75.3 kg).  Medication Reconciliation: trinity Peterson      "

## 2018-04-17 NOTE — MR AVS SNAPSHOT
After Visit Summary   4/17/2018    Joaquina Perez    MRN: [id number]           Patient Information     Date Of Birth          1980        Visit Information        Provider Department      4/17/2018 10:20 AM Geetha Jacobs MD [facility]        Today's Diagnoses     Family planning    -  1      Care Instructions    Return for annual exam with Rene Hurley.    Nexplanon should be removed or replaced in 3 years or sooner if desired.            Follow-ups after your visit        Your next 10 appointments already scheduled     Sep 12, 2018 10:30 AM CDT   (Arrive by 10:15 AM)   PHYSICAL with MANASA Barriga CNM   [facility] [place] [facility]    [address]  [place] [address] [phone]              Who to contact     If you have questions or need follow up information about today's clinic visit or your schedule please contact [facility] directly at [phone].  Normal or non-critical lab and imaging results will be communicated to you by [email or web address], letter or phone within 4 business days after the clinic has received the results. If you do not hear from us within 7 days, please contact the clinic through [email or web address] or phone. If you have a critical or abnormal lab result, we will notify you by phone as soon as possible.  Submit refill requests through [employer] or call your pharmacy and they will forward the refill request to us. Please allow 3 business days for your refill to be completed.          Additional Information About Your Visit        MyChart Information     [employer] gives you secure access to your electronic health record. If you see a primary care provider, you can also send messages to your care team and make appointments. If you have questions, please call your primary care clinic.  If you do not have a primary care provider, please call [phone] and they will assist you.        Care EveryWhere ID     This is  "your Care EveryWhere ID. This could be used by other organizations to access your [place] medical records  [id number]        Your Vitals Were     Pulse Height Last Period BMI (Body Mass Index)          64 5' 5\" (1.651 m) 06/12/2017 (Exact Date) 27.62 kg/m2         Blood Pressure from Last 3 Encounters:   04/17/18 90/64   03/12/18 94/66   03/08/18 100/60    Weight from Last 3 Encounters:   04/17/18 166 lb (75.3 kg)   03/12/18 172 lb (78 kg)   03/08/18 177 lb (80.3 kg)              Today, you had the following     No orders found for display       Primary Care Provider Office Phone # Fax #    Seymour Alcantar [phone] [address] [id number]        Equal Access to Services     [facility] : Hadii aad ku hadasho Sotalia, waaxda luqadaha, qaybta kaalmada criss, sherice gutierrez . So [facility] [phone].    ATENCIÓN: Si habla español, tiene a moody disposición servicios gratuitos de asistencia lingüística. Llame al [phone].    We comply with applicable federal civil rights laws and [place] laws. We do not discriminate on the basis of race, color, national origin, age, disability, sex, sexual orientation, or gender identity.            Thank you!     Thank you for choosing [facility]  for your care. Our goal is always to provide you with excellent care. Hearing back from our patients is one way we can continue to improve our services. Please take a few minutes to complete the written survey that you may receive in the mail after your visit with us. Thank you!             Your Updated Medication List - Protect others around you: Learn how to safely use, store and throw away your medicines at www.disposemymeds.org.          This list is accurate as of 4/17/18 11:26 AM.  Always use your most recent med list.                   Brand Name Dispense Instructions for use Diagnosis    calcium carbonate 1250 MG tablet    OS-TANIA 500 mg [place]. Ca "     Take 1 tablet by mouth 2 times daily        omega-3 acid ethyl esters 1 g capsule    Lovaza     Take 2 g by mouth 2 times daily        prenatal multivitamin plus iron 27-0.8 MG Tabs per tablet      Take 1 tablet by mouth daily    Family planning, Depression with anxiety       TYLENOL PO      Take 500 mg by mouth        VITAMIN D (CHOLECALCIFEROL) PO      Take 1,000 Units by mouth daily

## 2018-04-17 NOTE — PROGRESS NOTES
Subjective Finding:    Chief compalint: Patient presents with:  Back Pain: still having low back pains  , Pain Scale: 6/10, Intensity: sharp, Duration: 1 years, Radiating: no.    Date of injury:     Activities that the pain restricts:   Home/household/hobbies/social activities: yes.  Work duties: yes.  Sleep: yes.  Makes symptoms better: rest.  Makes symptoms worse: sitting.  Have you seen anyone else for the symptoms? Yes: MD and GEORGETTE.  Work related: no.  Automobile related injury: no.    Objective and Assessment:    Posture Analysis:   High shoulder: .  Head tilt: .  High iliac crest: .  Head carriage: neutral.  Thoracic Kyphosis: forward.  Lumbar Lordosis: forward.    Lumbar Range of Motion: extension decreased.  Cervical Range of Motion: extension decreased.  Thoracic Range of Motion: extension decreased.  Extremity Range of Motion: .    Palpation:   Quad lumb: bilateral, referred pain: no  Traps: sharp pain, referred pain: no    Segmental dysfunction pre-treatment and treatment area: C4, T7, L3, L4 and L5.    Assessment post-treatment:  Cervical: ROM increased.  Thoracic: ROM increased.  Lumbar: ROM increased.    Comments: DDd of lumbar area.      Complicating Factors: structural abnormalities.    Procedure(s):  CMT:  65432 Chiropractic manipulative treatment 3-4 regions performed   Cervical: Diversified, See above for level, Supine, Thoracic: Diversified, See above for level, Prone and Lumbar: Diversified, See above for level, Side posture    Modalities:  None performed this visit    Therapeutic procedures:  None    Plan:  Treatment plan: PRN.  Instructed patient: stretch as instructed at visit.  Short term goals: increase ROM.  Long term goals: pain releif.  Prognosis: very good.

## 2018-04-17 NOTE — PATIENT INSTRUCTIONS
Return for annual exam with Reen Hurley.    Nexplanon should be removed or replaced in 3 years or sooner if desired.

## 2018-04-18 ASSESSMENT — PATIENT HEALTH QUESTIONNAIRE - PHQ9: SUM OF ALL RESPONSES TO PHQ QUESTIONS 1-9: 0

## 2018-04-18 ASSESSMENT — ANXIETY QUESTIONNAIRES: GAD7 TOTAL SCORE: 3

## 2018-04-20 ENCOUNTER — OFFICE VISIT (OUTPATIENT)
Dept: CHIROPRACTIC MEDICINE | Facility: OTHER | Age: 38
End: 2018-04-20
Attending: CHIROPRACTOR
Payer: COMMERCIAL

## 2018-04-20 DIAGNOSIS — M99.02 SEGMENTAL AND SOMATIC DYSFUNCTION OF THORACIC REGION: ICD-10-CM

## 2018-04-20 DIAGNOSIS — M99.03 SEGMENTAL AND SOMATIC DYSFUNCTION OF LUMBAR REGION: Primary | ICD-10-CM

## 2018-04-20 DIAGNOSIS — M54.50 ACUTE BILATERAL LOW BACK PAIN WITHOUT SCIATICA: ICD-10-CM

## 2018-04-20 PROCEDURE — 98941 CHIROPRACT MANJ 3-4 REGIONS: CPT | Mod: AT | Performed by: CHIROPRACTOR

## 2018-04-20 NOTE — MR AVS SNAPSHOT
After Visit Summary   4/20/2018    Joaquina Perez    MRN: [id number]           Patient Information     Date Of Birth          1980        Visit Information        Provider Department      4/20/2018 9:50 AM Adam Smyth DC  [facility] David Caraballo        Today's Diagnoses     Segmental and somatic dysfunction of lumbar region    -  1    Acute bilateral low back pain without sciatica        Segmental and somatic dysfunction of thoracic region           Follow-ups after your visit        Your next 10 appointments already scheduled     Sep 12, 2018 10:30 AM CDT   (Arrive by 10:15 AM)   PHYSICAL with MANASA Barriga CNM   [facility] [place] ([facility] [place] )    [address]  [place] [address]   [phone]              Who to contact     If you have questions or need follow up information about today's clinic visit or your schedule please contact  [facility] DAVID CARABALLO directly at [phone].  Normal or non-critical lab and imaging results will be communicated to you by MyChart, letter or phone within 4 business days after the clinic has received the results. If you do not hear from us within 7 days, please contact the clinic through VoyageByMehart or phone. If you have a critical or abnormal lab result, we will notify you by phone as soon as possible.  Submit refill requests through Social Trends Media or call your pharmacy and they will forward the refill request to us. Please allow 3 business days for your refill to be completed.          Additional Information About Your Visit        VoyageByMehart Information     [employer] gives you secure access to your electronic health record. If you see a primary care provider, you can also send messages to your care team and make appointments. If you have questions, please call your primary care clinic.  If you do not have a primary care provider, please call [phone] and they will assist you.        Care EveryWhere ID     This is your  Care EveryWhere ID. This could be used by other organizations to access your [place] medical records  [id number]        Your Vitals Were     Last Period                   06/12/2017 (Exact Date)            Blood Pressure from Last 3 Encounters:   04/17/18 90/64   03/12/18 94/66   03/08/18 100/60    Weight from Last 3 Encounters:   04/17/18 166 lb (75.3 kg)   03/12/18 172 lb (78 kg)   03/08/18 177 lb (80.3 kg)              We Performed the Following     CHIROPRAC MANIP,SPINAL,1-2 REGIONS        Primary Care Provider Office Phone # Fax #    Seymour Alcantar [phone] [address] [address]        Equal Access to Services     ERIC OCONNELL : Hadii erika lopezo [place], waaxda luqadaha, qaybta kaalmada ademoisesyagregorio, sherice gutierrez . So [facility] [phone].    ATENCIÓN: Si habla español, tiene a moody disposición servicios gratuitos de asistencia lingüística. [facility] [phone].    We comply with applicable federal civil rights laws and [place] laws. We do not discriminate on the basis of race, color, national origin, age, disability, sex, sexual orientation, or gender identity.            Thank you!     Thank you for choosing  [facility]  for your care. Our goal is always to provide you with excellent care. Hearing back from our patients is one way we can continue to improve our services. Please take a few minutes to complete the written survey that you may receive in the mail after your visit with us. Thank you!             Your Updated Medication List - Protect others around you: Learn how to safely use, store and throw away your medicines at www.disposemymeds.org.          This list is accurate as of 4/20/18 11:32 AM.  Always use your most recent med list.                   Brand Name Dispense Instructions for use Diagnosis    calcium carbonate 1250 MG tablet    OS-TANIA 500 mg [place]. Ca     Take 1 tablet by mouth 2 times daily         etonogestrel 68 MG Impl    IMPLANON/NEXPLANON     1 each (68 mg) by Subdermal route continuous    Family planning       omega-3 acid ethyl esters 1 g capsule    Lovaza     Take 2 g by mouth 2 times daily        prenatal multivitamin plus iron 27-0.8 MG Tabs per tablet      Take 1 tablet by mouth daily    Family planning, Depression with anxiety       TYLENOL PO      Take 500 mg by mouth        VITAMIN D (CHOLECALCIFEROL) PO      Take 1,000 Units by mouth daily

## 2018-04-20 NOTE — PROGRESS NOTES
Subjective Finding:    Chief compalint: Patient presents with:  Back Pain: still having tightness in mm of the legs.  low back pain is getting better  , Pain Scale: 6/10, Intensity: sharp, Duration: 1 years, Radiating: no.    Date of injury:     Activities that the pain restricts:   Home/household/hobbies/social activities: yes.  Work duties: yes.  Sleep: yes.  Makes symptoms better: rest.  Makes symptoms worse: sitting.  Have you seen anyone else for the symptoms? Yes: MD and GEORGETTE.  Work related: no.  Automobile related injury: no.    Objective and Assessment:    Posture Analysis:   High shoulder: .  Head tilt: .  High iliac crest: .  Head carriage: neutral.  Thoracic Kyphosis: forward.  Lumbar Lordosis: forward.    Lumbar Range of Motion: extension decreased.  Cervical Range of Motion: extension decreased.  Thoracic Range of Motion: extension decreased.  Extremity Range of Motion: .    Palpation:   Quad lumb: bilateral, referred pain: no  Traps: sharp pain, referred pain: no    Segmental dysfunction pre-treatment and treatment area: C4, T7, L3, L4 and L5.    Assessment post-treatment:  Cervical: ROM increased.  Thoracic: ROM increased.  Lumbar: ROM increased.    Comments: DDd of lumbar area.      Complicating Factors: structural abnormalities.    Procedure(s):  CMT:  30304 Chiropractic manipulative treatment 3-4 regions performed   Cervical: Diversified, See above for level, Supine, Thoracic: Diversified, See above for level, Prone and Lumbar: Diversified, See above for level, Side posture    Modalities:  None performed this visit    Therapeutic procedures:  None    Plan:  Treatment plan: PRN.  Instructed patient: stretch as instructed at visit.  Short term goals: increase ROM.  Long term goals: pain releif.  Prognosis: very good.

## 2018-04-25 ENCOUNTER — OFFICE VISIT (OUTPATIENT)
Dept: CHIROPRACTIC MEDICINE | Facility: OTHER | Age: 38
End: 2018-04-25
Attending: CHIROPRACTOR
Payer: COMMERCIAL

## 2018-04-25 DIAGNOSIS — M99.02 SEGMENTAL AND SOMATIC DYSFUNCTION OF THORACIC REGION: ICD-10-CM

## 2018-04-25 DIAGNOSIS — M54.50 ACUTE BILATERAL LOW BACK PAIN WITHOUT SCIATICA: ICD-10-CM

## 2018-04-25 DIAGNOSIS — M99.03 SEGMENTAL AND SOMATIC DYSFUNCTION OF LUMBAR REGION: Primary | ICD-10-CM

## 2018-04-25 PROCEDURE — 98941 CHIROPRACT MANJ 3-4 REGIONS: CPT | Mod: AT | Performed by: CHIROPRACTOR

## 2018-04-25 NOTE — MR AVS SNAPSHOT
After Visit Summary   4/25/2018    Joaquina Perez    MRN: [id number]           Patient Information     Date Of Birth          1980        Visit Information        Provider Department      4/25/2018 4:10 PM Adam Smyth DC  [facility] David Caraballo        Today's Diagnoses     Segmental and somatic dysfunction of lumbar region    -  1    Acute bilateral low back pain without sciatica        Segmental and somatic dysfunction of thoracic region           Follow-ups after your visit        Your next 10 appointments already scheduled     Sep 12, 2018 10:30 AM CDT   (Arrive by 10:15 AM)   PHYSICAL with MANASA Barriga CNM   [facility] [place] ([facility] [place] )    [address]  [place] [address]   [phone]              Who to contact     If you have questions or need follow up information about today's clinic visit or your schedule please contact  [facility] DAVID CARABALLO directly at [phone].  Normal or non-critical lab and imaging results will be communicated to you by MyChart, letter or phone within 4 business days after the clinic has received the results. If you do not hear from us within 7 days, please contact the clinic through Hypecalhart or phone. If you have a critical or abnormal lab result, we will notify you by phone as soon as possible.  Submit refill requests through Stealz or call your pharmacy and they will forward the refill request to us. Please allow 3 business days for your refill to be completed.          Additional Information About Your Visit        Hypecalhart Information     Stealz gives you secure access to your electronic health record. If you see a primary care provider, you can also send messages to your care team and make appointments. If you have questions, please call your primary care clinic.  If you do not have a primary care provider, please call [phone] and they will assist you.        Care EveryWhere ID     This is your  Care EveryWhere ID. This could be used by other organizations to access your [place] medical records  [id number]        Your Vitals Were     Last Period                   06/12/2017 (Exact Date)            Blood Pressure from Last 3 Encounters:   04/17/18 90/64   03/12/18 94/66   03/08/18 100/60    Weight from Last 3 Encounters:   04/17/18 166 lb (75.3 kg)   03/12/18 172 lb (78 kg)   03/08/18 177 lb (80.3 kg)              We Performed the Following     CHIROPRAC MANIP,SPINAL,1-2 REGIONS        Primary Care Provider Office Phone # Fax #    Seymour Alcantar [phone] [address] [address]        Equal Access to Services     ERIC OCONNELL : Hadii erika lopezo [place], waaxda luqadaha, qaybta kaalmada ademoisesyagregorio, sherice gutierrez . So [facility] [phone].    ATENCIÓN: Si habla español, tiene a moody disposición servicios gratuitos de asistencia lingüística. [facility] [phone].    We comply with applicable federal civil rights laws and [place] laws. We do not discriminate on the basis of race, color, national origin, age, disability, sex, sexual orientation, or gender identity.            Thank you!     Thank you for choosing  [facility]  for your care. Our goal is always to provide you with excellent care. Hearing back from our patients is one way we can continue to improve our services. Please take a few minutes to complete the written survey that you may receive in the mail after your visit with us. Thank you!             Your Updated Medication List - Protect others around you: Learn how to safely use, store and throw away your medicines at www.disposemymeds.org.          This list is accurate as of 4/25/18 11:59 PM.  Always use your most recent med list.                   Brand Name Dispense Instructions for use Diagnosis    calcium carbonate 500 tablet    OS-TANIA 500 mg [place]. Ca     Take 1 tablet by mouth 2 times daily        etonogestrel  68 MG Impl    IMPLANON/NEXPLANON     1 each (68 mg) by Subdermal route continuous    Family planning       omega-3 acid ethyl esters 1 g capsule    Lovaza     Take 2 g by mouth 2 times daily        prenatal multivitamin plus iron 27-0.8 MG Tabs per tablet      Take 1 tablet by mouth daily    Family planning, Depression with anxiety       TYLENOL PO      Take 500 mg by mouth        VITAMIN D (CHOLECALCIFEROL) PO      Take 1,000 Units by mouth daily

## 2018-04-26 NOTE — PROGRESS NOTES
Subjective Finding:    Chief compalint: Patient presents with:  Back Pain: getting some relief.  still has leg pains at times  , Pain Scale: 6/10, Intensity: sharp, Duration: 1 years, Radiating: no.    Date of injury:     Activities that the pain restricts:   Home/household/hobbies/social activities: yes.  Work duties: yes.  Sleep: yes.  Makes symptoms better: rest.  Makes symptoms worse: sitting.  Have you seen anyone else for the symptoms? Yes: MD and GEORGETTE.  Work related: no.  Automobile related injury: no.    Objective and Assessment:    Posture Analysis:   High shoulder: .  Head tilt: .  High iliac crest: .  Head carriage: neutral.  Thoracic Kyphosis: forward.  Lumbar Lordosis: forward.    Lumbar Range of Motion: extension decreased.  Cervical Range of Motion: extension decreased.  Thoracic Range of Motion: extension decreased.  Extremity Range of Motion: .    Palpation:   Quad lumb: bilateral, referred pain: no  Traps: sharp pain, referred pain: no    Segmental dysfunction pre-treatment and treatment area: C4, T7, L3, L4 and L5.    Assessment post-treatment:  Cervical: ROM increased.  Thoracic: ROM increased.  Lumbar: ROM increased.    Comments: DDd of lumbar area.      Complicating Factors: structural abnormalities.    Procedure(s):  CMT:  96663 Chiropractic manipulative treatment 3-4 regions performed   Cervical: Diversified, See above for level, Supine, Thoracic: Diversified, See above for level, Prone and Lumbar: Diversified, See above for level, Side posture    Modalities:  None performed this visit    Therapeutic procedures:  None    Plan:  Treatment plan: PRN.  Instructed patient: stretch as instructed at visit.  Short term goals: increase ROM.  Long term goals: pain releif.  Prognosis: very good.

## 2018-05-07 ENCOUNTER — OFFICE VISIT (OUTPATIENT)
Dept: CHIROPRACTIC MEDICINE | Facility: OTHER | Age: 38
End: 2018-05-07
Attending: CHIROPRACTOR
Payer: COMMERCIAL

## 2018-05-07 DIAGNOSIS — M54.50 ACUTE BILATERAL LOW BACK PAIN WITHOUT SCIATICA: ICD-10-CM

## 2018-05-07 DIAGNOSIS — M99.02 SEGMENTAL AND SOMATIC DYSFUNCTION OF THORACIC REGION: ICD-10-CM

## 2018-05-07 DIAGNOSIS — M99.03 SEGMENTAL AND SOMATIC DYSFUNCTION OF LUMBAR REGION: Primary | ICD-10-CM

## 2018-05-07 PROCEDURE — 98941 CHIROPRACT MANJ 3-4 REGIONS: CPT | Mod: AT | Performed by: CHIROPRACTOR

## 2018-05-07 NOTE — MR AVS SNAPSHOT
After Visit Summary   5/7/2018    Joaquina Perez    MRN: [id number]           Patient Information     Date Of Birth          1980        Visit Information        Provider Department      5/7/2018 11:50 AM Adam Smyth DC  [facility] David Caraballo        Today's Diagnoses     Segmental and somatic dysfunction of lumbar region    -  1    Acute bilateral low back pain without sciatica        Segmental and somatic dysfunction of thoracic region           Follow-ups after your visit        Your next 10 appointments already scheduled     Sep 12, 2018 10:30 AM CDT   (Arrive by 10:15 AM)   PHYSICAL with MANASA Barriga CNM   [facility] [place] ([facility] [place] )    [address]  [place] [address]   [phone]              Who to contact     If you have questions or need follow up information about today's clinic visit or your schedule please contact  [facility] DAVID CARABALLO directly at [phone].  Normal or non-critical lab and imaging results will be communicated to you by Acumaticahart, letter or phone within 4 business days after the clinic has received the results. If you do not hear from us within 7 days, please contact the clinic through Acumaticahart or phone. If you have a critical or abnormal lab result, we will notify you by phone as soon as possible.  Submit refill requests through Enohm or call your pharmacy and they will forward the refill request to us. Please allow 3 business days for your refill to be completed.          Additional Information About Your Visit        Acumaticahart Information     Enohm gives you secure access to your electronic health record. If you see a primary care provider, you can also send messages to your care team and make appointments. If you have questions, please call your primary care clinic.  If you do not have a primary care provider, please call [phone] and they will assist you.        Care EveryWhere ID     This is your  Care EveryWhere ID. This could be used by other organizations to access your [place] medical records  [id number]        Your Vitals Were     Last Period                   06/12/2017 (Exact Date)            Blood Pressure from Last 3 Encounters:   04/17/18 90/64   03/12/18 94/66   03/08/18 100/60    Weight from Last 3 Encounters:   04/17/18 166 lb (75.3 kg)   03/12/18 172 lb (78 kg)   03/08/18 177 lb (80.3 kg)              We Performed the Following     CHIROPRAC MANIP,SPINAL,1-2 REGIONS        Primary Care Provider Office Phone # Fax #    Seymour Alcantar [phone] [address] [address]        Equal Access to Services     ERIC OCONNELL : Hadii erika lopezo [place], waaxda luqadaha, qaybta kaalmada ademoisesyagregorio, sherice gutierrez . So [facility] [phone].    ATENCIÓN: Si habla español, tiene a moody disposición servicios gratuitos de asistencia lingüística. [facility] [phone].    We comply with applicable federal civil rights laws and [place] laws. We do not discriminate on the basis of race, color, national origin, age, disability, sex, sexual orientation, or gender identity.            Thank you!     Thank you for choosing  [facility]  for your care. Our goal is always to provide you with excellent care. Hearing back from our patients is one way we can continue to improve our services. Please take a few minutes to complete the written survey that you may receive in the mail after your visit with us. Thank you!             Your Updated Medication List - Protect others around you: Learn how to safely use, store and throw away your medicines at www.disposemymeds.org.          This list is accurate as of 5/7/18 11:59 PM.  Always use your most recent med list.                   Brand Name Dispense Instructions for use Diagnosis    calcium carbonate 500 MG tablet    OS-TANIA 500 mg [place]. Ca     Take 1 tablet by mouth 2 times daily         etonogestrel 68 MG Impl    IMPLANON/NEXPLANON     1 each (68 mg) by Subdermal route continuous    Family planning       omega-3 acid ethyl esters 1 g capsule    Lovaza     Take 2 g by mouth 2 times daily        prenatal multivitamin plus iron 27-0.8 MG Tabs per tablet      Take 1 tablet by mouth daily    Family planning, Depression with anxiety       TYLENOL PO      Take 500 mg by mouth        VITAMIN D (CHOLECALCIFEROL) PO      Take 1,000 Units by mouth daily

## 2018-05-08 NOTE — PROGRESS NOTES
Subjective Finding:    Chief compalint: Patient presents with:  Back Pain: doing better with the treatments  , Pain Scale: 6/10, Intensity: sharp, Duration: 1 years, Radiating: no.    Date of injury:     Activities that the pain restricts:   Home/household/hobbies/social activities: yes.  Work duties: yes.  Sleep: yes.  Makes symptoms better: rest.  Makes symptoms worse: sitting.  Have you seen anyone else for the symptoms? Yes: MD and GEORGETTE.  Work related: no.  Automobile related injury: no.    Objective and Assessment:    Posture Analysis:   High shoulder: .  Head tilt: .  High iliac crest: .  Head carriage: neutral.  Thoracic Kyphosis: forward.  Lumbar Lordosis: forward.    Lumbar Range of Motion: extension decreased.  Cervical Range of Motion: extension decreased.  Thoracic Range of Motion: extension decreased.  Extremity Range of Motion: .    Palpation:   Quad lumb: bilateral, referred pain: no  Traps: sharp pain, referred pain: no    Segmental dysfunction pre-treatment and treatment area: C4, T7, L3, L4 and L5.    Assessment post-treatment:  Cervical: ROM increased.  Thoracic: ROM increased.  Lumbar: ROM increased.    Comments: DDd of lumbar area.      Complicating Factors: structural abnormalities.    Procedure(s):  CMT:  81066 Chiropractic manipulative treatment 3-4 regions performed   Cervical: Diversified, See above for level, Supine, Thoracic: Diversified, See above for level, Prone and Lumbar: Diversified, See above for level, Side posture    Modalities:  None performed this visit    Therapeutic procedures:  None    Plan:  Treatment plan: PRN.  Instructed patient: stretch as instructed at visit.  Short term goals: increase ROM.  Long term goals: pain releif.  Prognosis: very good.

## 2018-05-14 ENCOUNTER — OFFICE VISIT (OUTPATIENT)
Dept: CHIROPRACTIC MEDICINE | Facility: OTHER | Age: 38
End: 2018-05-14
Attending: CHIROPRACTOR
Payer: COMMERCIAL

## 2018-05-14 DIAGNOSIS — M54.50 ACUTE BILATERAL LOW BACK PAIN WITHOUT SCIATICA: ICD-10-CM

## 2018-05-14 DIAGNOSIS — M99.02 SEGMENTAL AND SOMATIC DYSFUNCTION OF THORACIC REGION: ICD-10-CM

## 2018-05-14 DIAGNOSIS — M99.03 SEGMENTAL AND SOMATIC DYSFUNCTION OF LUMBAR REGION: Primary | ICD-10-CM

## 2018-05-14 PROCEDURE — 98941 CHIROPRACT MANJ 3-4 REGIONS: CPT | Mod: AT | Performed by: CHIROPRACTOR

## 2018-05-14 NOTE — PROGRESS NOTES
Subjective Finding:    Chief compalint: Patient presents with:  Back Pain: feeling good relief with treatment  , Pain Scale: 6/10, Intensity: sharp, Duration: 1 years, Radiating: no.    Date of injury:     Activities that the pain restricts:   Home/household/hobbies/social activities: yes.  Work duties: yes.  Sleep: yes.  Makes symptoms better: rest.  Makes symptoms worse: sitting.  Have you seen anyone else for the symptoms? Yes: MD and GEORGETTE.  Work related: no.  Automobile related injury: no.    Objective and Assessment:    Posture Analysis:   High shoulder: .  Head tilt: .  High iliac crest: .  Head carriage: neutral.  Thoracic Kyphosis: forward.  Lumbar Lordosis: forward.    Lumbar Range of Motion: extension decreased.  Cervical Range of Motion: extension decreased.  Thoracic Range of Motion: extension decreased.  Extremity Range of Motion: .    Palpation:   Quad lumb: bilateral, referred pain: no  Traps: sharp pain, referred pain: no    Segmental dysfunction pre-treatment and treatment area: C4, T7, L3, L4 and L5.    Assessment post-treatment:  Cervical: ROM increased.  Thoracic: ROM increased.  Lumbar: ROM increased.    Comments: DDd of lumbar area.      Complicating Factors: structural abnormalities.    Procedure(s):  CMT:  83149 Chiropractic manipulative treatment 3-4 regions performed   Cervical: Diversified, See above for level, Supine, Thoracic: Diversified, See above for level, Prone and Lumbar: Diversified, See above for level, Side posture    Modalities:  None performed this visit    Therapeutic procedures:  None    Plan:  Treatment plan: PRN.  Instructed patient: stretch as instructed at visit.  Short term goals: increase ROM.  Long term goals: pain releif.  Prognosis: very good.

## 2018-05-14 NOTE — MR AVS SNAPSHOT
After Visit Summary   5/14/2018    Joaquina Perez    MRN: [id number]           Patient Information     Date Of Birth          1980        Visit Information        Provider Department      5/14/2018 1:40 PM Adam Smyth DC  [facility] David Caraballo        Today's Diagnoses     Segmental and somatic dysfunction of lumbar region    -  1    Acute bilateral low back pain without sciatica        Segmental and somatic dysfunction of thoracic region           Follow-ups after your visit        Your next 10 appointments already scheduled     Sep 12, 2018 10:30 AM CDT   (Arrive by 10:15 AM)   PHYSICAL with MANASA Barriga CNM   [facility] [place] ([facility] [place] )    [address]  [place] [address]   [phone]              Who to contact     If you have questions or need follow up information about today's clinic visit or your schedule please contact  [facility] DAVID CARABALLO directly at [phone].  Normal or non-critical lab and imaging results will be communicated to you by TweetMemehart, letter or phone within 4 business days after the clinic has received the results. If you do not hear from us within 7 days, please contact the clinic through TweetMemehart or phone. If you have a critical or abnormal lab result, we will notify you by phone as soon as possible.  Submit refill requests through PushCoin or call your pharmacy and they will forward the refill request to us. Please allow 3 business days for your refill to be completed.          Additional Information About Your Visit        TweetMemehart Information     PushCoin gives you secure access to your electronic health record. If you see a primary care provider, you can also send messages to your care team and make appointments. If you have questions, please call your primary care clinic.  If you do not have a primary care provider, please call [phone] and they will assist you.        Care EveryWhere ID     This is your  Care EveryWhere ID. This could be used by other organizations to access your [facility] medical records  [id number]        Your Vitals Were     Last Period                   06/12/2017 (Exact Date)            Blood Pressure from Last 3 Encounters:   04/17/18 90/64   03/12/18 94/66   03/08/18 100/60    Weight from Last 3 Encounters:   04/17/18 166 lb (75.3 kg)   03/12/18 172 lb (78 kg)   03/08/18 177 lb (80.3 kg)              We Performed the Following     CHIROPRAC MANIP,SPINAL,1-2 REGIONS        Primary Care Provider Office Phone # Fax #    Seymour Alcantar [phone] [address] [address]        Equal Access to Services     EIRC OCONNELL : Hadii erika lopezo Sotalia, waaxda luqadaha, qaybta kaalmada ademoisesyagregorio, sherice gutierrez . So [facility] [phone].    ATENCIÓN: Si habla español, tiene a moody disposición servicios gratuitos de asistencia lingüística. [facility] [phone].    We comply with applicable federal civil rights laws and [place] laws. We do not discriminate on the basis of race, color, national origin, age, disability, sex, sexual orientation, or gender identity.            Thank you!     Thank you for choosing  [facility]  for your care. Our goal is always to provide you with excellent care. Hearing back from our patients is one way we can continue to improve our services. Please take a few minutes to complete the written survey that you may receive in the mail after your visit with us. Thank you!             Your Updated Medication List - Protect others around you: Learn how to safely use, store and throw away your medicines at www.disposemymeds.org.          This list is accurate as of 5/14/18  2:47 PM.  Always use your most recent med list.                   Brand Name Dispense Instructions for use Diagnosis    calcium carbonate 500 MG tablet    OS-TANIA 500 mg [place]. Ca     Take 1 tablet by mouth 2 times daily         etonogestrel 68 MG Impl    IMPLANON/NEXPLANON     1 each (68 mg) by Subdermal route continuous    Family planning       omega-3 acid ethyl esters 1 g capsule    Lovaza     Take 2 g by mouth 2 times daily        prenatal multivitamin plus iron 27-0.8 MG Tabs per tablet      Take 1 tablet by mouth daily    Family planning, Depression with anxiety       TYLENOL PO      Take 500 mg by mouth        VITAMIN D (CHOLECALCIFEROL) PO      Take 1,000 Units by mouth daily

## 2018-05-30 ENCOUNTER — OFFICE VISIT (OUTPATIENT)
Dept: CHIROPRACTIC MEDICINE | Facility: OTHER | Age: 38
End: 2018-05-30
Attending: CHIROPRACTOR
Payer: COMMERCIAL

## 2018-05-30 DIAGNOSIS — M54.50 ACUTE BILATERAL LOW BACK PAIN WITHOUT SCIATICA: ICD-10-CM

## 2018-05-30 DIAGNOSIS — M99.02 SEGMENTAL AND SOMATIC DYSFUNCTION OF THORACIC REGION: ICD-10-CM

## 2018-05-30 DIAGNOSIS — M99.03 SEGMENTAL AND SOMATIC DYSFUNCTION OF LUMBAR REGION: Primary | ICD-10-CM

## 2018-05-30 PROCEDURE — 98940 CHIROPRACT MANJ 1-2 REGIONS: CPT | Mod: AT | Performed by: CHIROPRACTOR

## 2018-05-30 NOTE — MR AVS SNAPSHOT
After Visit Summary   5/30/2018    Joaquina Perez    MRN: [id number]           Patient Information     Date Of Birth          1980        Visit Information        Provider Department      5/30/2018 1:30 PM Adam Smyth DC  [facility] David Caraballo        Today's Diagnoses     Segmental and somatic dysfunction of lumbar region    -  1    Acute bilateral low back pain without sciatica        Segmental and somatic dysfunction of thoracic region           Follow-ups after your visit        Your next 10 appointments already scheduled     Sep 12, 2018 10:30 AM CDT   (Arrive by 10:15 AM)   PHYSICAL with MANASA Barriga CNM   [facility] [place] ([facility] [place] )    [address]  [place] [address]   [phone]              Who to contact     If you have questions or need follow up information about today's clinic visit or your schedule please contact  [facility] DAVID CARABALLO directly at [phone].  Normal or non-critical lab and imaging results will be communicated to you by MyChart, letter or phone within 4 business days after the clinic has received the results. If you do not hear from us within 7 days, please contact the clinic through Katohart or phone. If you have a critical or abnormal lab result, we will notify you by phone as soon as possible.  Submit refill requests through [employer] or call your pharmacy and they will forward the refill request to us. Please allow 3 business days for your refill to be completed.          Additional Information About Your Visit        Katohart Information     [employer] gives you secure access to your electronic health record. If you see a primary care provider, you can also send messages to your care team and make appointments. If you have questions, please call your primary care clinic.  If you do not have a primary care provider, please call [phone] and they will assist you.        Care EveryWhere ID     This is your  Care EveryWhere ID. This could be used by other organizations to access your [facility] medical records  [id number]         Blood Pressure from Last 3 Encounters:   04/17/18 90/64   03/12/18 94/66   03/08/18 100/60    Weight from Last 3 Encounters:   04/17/18 166 lb (75.3 kg)   03/12/18 172 lb (78 kg)   03/08/18 177 lb (80.3 kg)              We Performed the Following     CHIROPRAC MANIP,SPINAL,1-2 REGIONS        Primary Care Provider Office Phone # Fax #    Seymour Alcantar [phone] [address] [phone]        Equal Access to Services     ERIC OCONNELL : Killian Arvizu, waorlando dimas, lionel kaalmagregorio kahn, sherice gutierrez . So [facility] [phone].    ATENCIÓN: Si habla español, tiene a moody disposición servicios gratuitos de asistencia lingüística. Llame al [phone].    We comply with applicable federal civil rights laws and [place] laws. We do not discriminate on the basis of race, color, national origin, age, disability, sex, sexual orientation, or gender identity.            Thank you!     Thank you for choosing  [facility]  for your care. Our goal is always to provide you with excellent care. Hearing back from our patients is one way we can continue to improve our services. Please take a few minutes to complete the written survey that you may receive in the mail after your visit with us. Thank you!             Your Updated Medication List - Protect others around you: Learn how to safely use, store and throw away your medicines at www.disposemymeds.org.          This list is accurate as of 5/30/18  3:32 PM.  Always use your most recent med list.                   Brand Name Dispense Instructions for use Diagnosis    calcium carbonate 500 MG tablet    OS-TANIA 500 mg [place]. Ca     Take 1 tablet by mouth 2 times daily        etonogestrel 68 MG Impl    IMPLANON/NEXPLANON     1 each (68 mg) by Subdermal route continuous     Family planning       omega-3 acid ethyl esters 1 g capsule    Lovaza     Take 2 g by mouth 2 times daily        prenatal multivitamin plus iron 27-0.8 MG Tabs per tablet      Take 1 tablet by mouth daily    Family planning, Depression with anxiety       TYLENOL PO      Take 500 mg by mouth        VITAMIN D (CHOLECALCIFEROL) PO      Take 1,000 Units by mouth daily

## 2018-05-30 NOTE — PROGRESS NOTES
Subjective Finding:    Chief compalint: Patient presents with:  Back Pain: still having low back and leg pain with wakling   , Pain Scale: 6/10, Intensity: sharp, Duration: 1 years, Radiating: no.    Date of injury:     Activities that the pain restricts:   Home/household/hobbies/social activities: yes.  Work duties: yes.  Sleep: yes.  Makes symptoms better: rest.  Makes symptoms worse: sitting.  Have you seen anyone else for the symptoms? Yes: MD and GEORGETTE.  Work related: no.  Automobile related injury: no.    Objective and Assessment:    Posture Analysis:   High shoulder: .  Head tilt: .  High iliac crest: .  Head carriage: neutral.  Thoracic Kyphosis: forward.  Lumbar Lordosis: forward.    Lumbar Range of Motion: extension decreased.  Cervical Range of Motion: extension decreased.  Thoracic Range of Motion: extension decreased.  Extremity Range of Motion: .    Palpation:   Quad lumb: bilateral, referred pain: no  Traps: sharp pain, referred pain: no    Segmental dysfunction pre-treatment and treatment area: C4, T7, L3, L4 and L5.    Assessment post-treatment:  Cervical: ROM increased.  Thoracic: ROM increased.  Lumbar: ROM increased.    Comments: DDd of lumbar area.      Complicating Factors: structural abnormalities.    Procedure(s):  CMT:  15823 Chiropractic manipulative treatment 3-4 regions performed   Cervical: Diversified, See above for level, Supine, Thoracic: Diversified, See above for level, Prone and Lumbar: Diversified, See above for level, Side posture    Modalities:  None performed this visit    Therapeutic procedures:  None    Plan:  Treatment plan: PRN.  Instructed patient: stretch as instructed at visit.  Short term goals: increase ROM.  Long term goals: pain releif.  Prognosis: very good.

## 2018-06-08 ENCOUNTER — OFFICE VISIT (OUTPATIENT)
Dept: CHIROPRACTIC MEDICINE | Facility: OTHER | Age: 38
End: 2018-06-08
Attending: CHIROPRACTOR
Payer: COMMERCIAL

## 2018-06-08 DIAGNOSIS — M54.50 ACUTE BILATERAL LOW BACK PAIN WITHOUT SCIATICA: ICD-10-CM

## 2018-06-08 DIAGNOSIS — M99.02 SEGMENTAL AND SOMATIC DYSFUNCTION OF THORACIC REGION: ICD-10-CM

## 2018-06-08 DIAGNOSIS — M99.03 SEGMENTAL AND SOMATIC DYSFUNCTION OF LUMBAR REGION: Primary | ICD-10-CM

## 2018-06-08 PROCEDURE — 98941 CHIROPRACT MANJ 3-4 REGIONS: CPT | Mod: AT | Performed by: CHIROPRACTOR

## 2018-06-08 NOTE — MR AVS SNAPSHOT
After Visit Summary   6/8/2018    Joaquina Perez    MRN: [id number]           Patient Information     Date Of Birth          1980        Visit Information        Provider Department      6/8/2018 11:20 AM Adam Smyth DC  [facility] David Caraballo        Today's Diagnoses     Segmental and somatic dysfunction of lumbar region    -  1    Acute bilateral low back pain without sciatica        Segmental and somatic dysfunction of thoracic region           Follow-ups after your visit        Your next 10 appointments already scheduled     Sep 12, 2018 10:30 AM CDT   (Arrive by 10:15 AM)   PHYSICAL with MANASA Barriga CNM   [facility] [place] ([facility] [place] )    [address]  [place] [address]   [phone]              Who to contact     If you have questions or need follow up information about today's clinic visit or your schedule please contact  [facility] DAVID CARABALLO directly at [phone].  Normal or non-critical lab and imaging results will be communicated to you by Phlebotek Phlebot[employer] Solutionshart, letter or phone within 4 business days after the clinic has received the results. If you do not hear from us within 7 days, please contact the clinic through Phlebot[employer]lebot[employer]hart or phone. If you have a critical or abnormal lab result, we will notify you by phone as soon as possible.  Submit refill requests through Aditive or call your pharmacy and they will forward the refill request to us. Please allow 3 business days for your refill to be completed.          Additional Information About Your Visit        Phlebotek Phlebot[employer] Solutionshart Information     Aditive gives you secure access to your electronic health record. If you see a primary care provider, you can also send messages to your care team and make appointments. If you have questions, please call your primary care clinic.  If you do not have a primary care provider, please call [phone] and they will assist you.        Care EveryWhere ID     This is your  Care EveryWhere ID. This could be used by other organizations to access your [facility] medical records  [id number]         Blood Pressure from Last 3 Encounters:   04/17/18 90/64   03/12/18 94/66   03/08/18 100/60    Weight from Last 3 Encounters:   04/17/18 166 lb (75.3 kg)   03/12/18 172 lb (78 kg)   03/08/18 177 lb (80.3 kg)              We Performed the Following     CHIROPRAC MANIP,SPINAL,1-2 REGIONS        Primary Care Provider Office Phone # Fax #    Seymour Alcantar [phone] [address] [phone]        Equal Access to Services     ERIC OCONNELL : Killian Arvizu, waorlando dimas, lionel kaalmagregorio kahn, sherice gutierrez . So [facility] [phone].    ATENCIÓN: Si habla español, tiene a moody disposición servicios gratuitos de asistencia lingüística. Llame al [phone].    We comply with applicable federal civil rights laws and [place] laws. We do not discriminate on the basis of race, color, national origin, age, disability, sex, sexual orientation, or gender identity.            Thank you!     Thank you for choosing  [facility]  for your care. Our goal is always to provide you with excellent care. Hearing back from our patients is one way we can continue to improve our services. Please take a few minutes to complete the written survey that you may receive in the mail after your visit with us. Thank you!             Your Updated Medication List - Protect others around you: Learn how to safely use, store and throw away your medicines at www.disposemymeds.org.          This list is accurate as of 6/8/18 11:59 PM.  Always use your most recent med list.                   Brand Name Dispense Instructions for use Diagnosis    calcium carbonate 500 MG tablet    OS-TANIA 500 mg [place]. Ca     Take 1 tablet by mouth 2 times daily        etonogestrel 68 MG Impl    IMPLANON/NEXPLANON     1 each (68 mg) by Subdermal route continuous     Family planning       omega-3 acid ethyl esters 1 g capsule    Lovaza     Take 2 g by mouth 2 times daily        prenatal multivitamin plus iron 27-0.8 MG Tabs per tablet      Take 1 tablet by mouth daily    Family planning, Depression with anxiety       TYLENOL PO      Take 500 mg by mouth        VITAMIN D (CHOLECALCIFEROL) PO      Take 1,000 Units by mouth daily

## 2018-06-11 ENCOUNTER — APPOINTMENT (OUTPATIENT)
Dept: OCCUPATIONAL MEDICINE | Facility: OTHER | Age: 38
End: 2018-06-11

## 2018-06-11 NOTE — PROGRESS NOTES
Subjective Finding:    Chief compalint: Patient presents with:  Back Pain  , Pain Scale: 6/10, Intensity: sharp, Duration: 1 years, Radiating: no.    Date of injury:     Activities that the pain restricts:   Home/household/hobbies/social activities: yes.  Work duties: yes.  Sleep: yes.  Makes symptoms better: rest.  Makes symptoms worse: sitting.  Have you seen anyone else for the symptoms? Yes: MD and GEORGETTE.  Work related: no.  Automobile related injury: no.    Objective and Assessment:    Posture Analysis:   High shoulder: .  Head tilt: .  High iliac crest: .  Head carriage: neutral.  Thoracic Kyphosis: forward.  Lumbar Lordosis: forward.    Lumbar Range of Motion: extension decreased.  Cervical Range of Motion: extension decreased.  Thoracic Range of Motion: extension decreased.  Extremity Range of Motion: .    Palpation:   Quad lumb: bilateral, referred pain: no  Traps: sharp pain, referred pain: no    Segmental dysfunction pre-treatment and treatment area: C4, T7, L3, L4 and L5.    Assessment post-treatment:  Cervical: ROM increased.  Thoracic: ROM increased.  Lumbar: ROM increased.    Comments: DDd of lumbar area.      Complicating Factors: structural abnormalities.    Procedure(s):  CMT:  33716 Chiropractic manipulative treatment 3-4 regions performed   Cervical: Diversified, See above for level, Supine, Thoracic: Diversified, See above for level, Prone and Lumbar: Diversified, See above for level, Side posture    Modalities:  None performed this visit    Therapeutic procedures:  None    Plan:  Treatment plan: PRN.  Instructed patient: stretch as instructed at visit.  Short term goals: increase ROM.  Long term goals: pain releif.  Prognosis: very good.

## 2018-06-12 ENCOUNTER — OFFICE VISIT (OUTPATIENT)
Dept: CHIROPRACTIC MEDICINE | Facility: OTHER | Age: 38
End: 2018-06-12
Attending: CHIROPRACTOR
Payer: COMMERCIAL

## 2018-06-12 DIAGNOSIS — M99.03 SEGMENTAL AND SOMATIC DYSFUNCTION OF LUMBAR REGION: Primary | ICD-10-CM

## 2018-06-12 DIAGNOSIS — M99.02 SEGMENTAL AND SOMATIC DYSFUNCTION OF THORACIC REGION: ICD-10-CM

## 2018-06-12 DIAGNOSIS — M54.50 ACUTE BILATERAL LOW BACK PAIN WITHOUT SCIATICA: ICD-10-CM

## 2018-06-12 PROCEDURE — 98940 CHIROPRACT MANJ 1-2 REGIONS: CPT | Mod: AT | Performed by: CHIROPRACTOR

## 2018-06-12 NOTE — MR AVS SNAPSHOT
After Visit Summary   6/12/2018    Joaquina Perez    MRN: [id number]           Patient Information     Date Of Birth          1980        Visit Information        Provider Department      6/12/2018 11:40 AM Adam Smyth DC  [facility] David Caraballo        Today's Diagnoses     Segmental and somatic dysfunction of lumbar region    -  1    Acute bilateral low back pain without sciatica        Segmental and somatic dysfunction of thoracic region           Follow-ups after your visit        Your next 10 appointments already scheduled     Sep 12, 2018 10:30 AM CDT   (Arrive by 10:15 AM)   PHYSICAL with MANASA Barriga CNM   [facility] [place] ([facility] [place] )    [address]  [place] [address]   [phone]              Who to contact     If you have questions or need follow up information about today's clinic visit or your schedule please contact  [facility] DAVID CARABALLO directly at [phone].  Normal or non-critical lab and imaging results will be communicated to you by Kimbiahart, letter or phone within 4 business days after the clinic has received the results. If you do not hear from us within 7 days, please contact the clinic through Kimbiahart or phone. If you have a critical or abnormal lab result, we will notify you by phone as soon as possible.  Submit refill requests through [employer] or call your pharmacy and they will forward the refill request to us. Please allow 3 business days for your refill to be completed.          Additional Information About Your Visit        Kimbiahart Information     [employer] gives you secure access to your electronic health record. If you see a primary care provider, you can also send messages to your care team and make appointments. If you have questions, please call your primary care clinic.  If you do not have a primary care provider, please call [phone] and they will assist you.        Care EveryWhere ID     This is your  Care EveryWhere ID. This could be used by other organizations to access your [place] medical records  [id number]         Blood Pressure from Last 3 Encounters:   04/17/18 90/64   03/12/18 94/66   03/08/18 100/60    Weight from Last 3 Encounters:   04/17/18 166 lb (75.3 kg)   03/12/18 172 lb (78 kg)   03/08/18 177 lb (80.3 kg)              We Performed the Following     CHIROPRAC MANIP,SPINAL,1-2 REGIONS        Primary Care Provider Office Phone # Fax #    Seymour Alcantar [phone] [address] [phone]        Equal Access to Services     ERIC OCONNELL : Killian Arvizu, waorlando dimas, lionel kaalmagregorio kahn, sherice gutierrez . So [facility] [phone].    ATENCIÓN: Si habla español, tiene a moody disposición servicios gratuitos de asistencia lingüística. Llame al [phone].    We comply with applicable federal civil rights laws and [place] laws. We do not discriminate on the basis of race, color, national origin, age, disability, sex, sexual orientation, or gender identity.            Thank you!     Thank you for choosing  [facility]  for your care. Our goal is always to provide you with excellent care. Hearing back from our patients is one way we can continue to improve our services. Please take a few minutes to complete the written survey that you may receive in the mail after your visit with us. Thank you!             Your Updated Medication List - Protect others around you: Learn how to safely use, store and throw away your medicines at www.disposemymeds.org.          This list is accurate as of 6/12/18  2:38 PM.  Always use your most recent med list.                   Brand Name Dispense Instructions for use Diagnosis    calcium carbonate 500 MG tablet    OS-TANIA 500 mg [place]. Ca     Take 1 tablet by mouth 2 times daily        etonogestrel 68 MG Impl    IMPLANON/NEXPLANON     1 each (68 mg) by Subdermal route continuous     Family planning       omega-3 acid ethyl esters 1 g capsule    Lovaza     Take 2 g by mouth 2 times daily        prenatal multivitamin plus iron 27-0.8 MG Tabs per tablet      Take 1 tablet by mouth daily    Family planning, Depression with anxiety       TYLENOL PO      Take 500 mg by mouth        VITAMIN D (CHOLECALCIFEROL) PO      Take 1,000 Units by mouth daily

## 2018-06-12 NOTE — PROGRESS NOTES
Subjective Finding:    Chief compalint: Patient presents with:  Back Pain  , Pain Scale: 6/10, Intensity: sharp, Duration: 1 years, Radiating: no.    Date of injury:     Activities that the pain restricts:   Home/household/hobbies/social activities: yes.  Work duties: yes.  Sleep: yes.  Makes symptoms better: rest.  Makes symptoms worse: sitting.  Have you seen anyone else for the symptoms? Yes: MD and GEORGETTE.  Work related: no.  Automobile related injury: no.    Objective and Assessment:    Posture Analysis:   High shoulder: .  Head tilt: .  High iliac crest: .  Head carriage: neutral.  Thoracic Kyphosis: forward.  Lumbar Lordosis: forward.    Lumbar Range of Motion: extension decreased.  Cervical Range of Motion: extension decreased.  Thoracic Range of Motion: extension decreased.  Extremity Range of Motion: .    Palpation:   Quad lumb: bilateral, referred pain: no  Traps: sharp pain, referred pain: no    Segmental dysfunction pre-treatment and treatment area: L45  T78.    Assessment post-treatment:  Cervical: ROM increased.  Thoracic: ROM increased.  Lumbar: ROM increased.    Comments: DDd of lumbar area.      Complicating Factors: structural abnormalities.    Procedure(s):  CMT:  34310 Chiropractic manipulative treatment 3-4 regions performed   Cervical: Diversified, See above for level, Supine, Thoracic: Diversified, See above for level, Prone and Lumbar: Diversified, See above for level, Side posture    Modalities:  None performed this visit    Therapeutic procedures:  None    Plan:  Treatment plan: PRN.  Instructed patient: stretch as instructed at visit.  Short term goals: increase ROM.  Long term goals: pain releif.  Prognosis: very good.

## 2018-06-20 ENCOUNTER — OFFICE VISIT (OUTPATIENT)
Dept: CHIROPRACTIC MEDICINE | Facility: OTHER | Age: 38
End: 2018-06-20
Attending: CHIROPRACTOR
Payer: COMMERCIAL

## 2018-06-20 DIAGNOSIS — M99.03 SEGMENTAL AND SOMATIC DYSFUNCTION OF LUMBAR REGION: Primary | ICD-10-CM

## 2018-06-20 DIAGNOSIS — M99.02 SEGMENTAL AND SOMATIC DYSFUNCTION OF THORACIC REGION: ICD-10-CM

## 2018-06-20 DIAGNOSIS — M54.50 ACUTE BILATERAL LOW BACK PAIN WITHOUT SCIATICA: ICD-10-CM

## 2018-06-20 PROCEDURE — 98940 CHIROPRACT MANJ 1-2 REGIONS: CPT | Mod: AT | Performed by: CHIROPRACTOR

## 2018-06-20 NOTE — MR AVS SNAPSHOT
After Visit Summary   6/20/2018    Joaquina Perez    MRN: [id number]           Patient Information     Date Of Birth          1980        Visit Information        Provider Department      6/20/2018 3:20 PM Adam Smyth DC  [facility] Caraballo        Today's Diagnoses     Segmental and somatic dysfunction of lumbar region    -  1    Acute bilateral low back pain without sciatica        Segmental and somatic dysfunction of thoracic region           Follow-ups after your visit        Your next 10 appointments already scheduled     Jul 09, 2018  2:30 PM CDT   (Arrive by 2:15 PM)   PHYSICAL with MANASA Barriga CNM   [facility] [place] ([facility] [place] )    [address]  [place] [address]   [phone]              Who to contact     If you have questions or need follow up information about today's clinic visit or your schedule please contact  [facility] DAVID CARABALLO directly at [phone].  Normal or non-critical lab and imaging results will be communicated to you by Youjiahart, letter or phone within 4 business days after the clinic has received the results. If you do not hear from us within 7 days, please contact the clinic through Youjiahart or phone. If you have a critical or abnormal lab result, we will notify you by phone as soon as possible.  Submit refill requests through CrowdMed or call your pharmacy and they will forward the refill request to us. Please allow 3 business days for your refill to be completed.          Additional Information About Your Visit        Youjiahart Information     CrowdMed gives you secure access to your electronic health record. If you see a primary care provider, you can also send messages to your care team and make appointments. If you have questions, please call your primary care clinic.  If you do not have a primary care provider, please call [phone] and they will assist you.        Care EveryWhere ID     This is your  Care EveryWhere ID. This could be used by other organizations to access your [place] medical records  [id number]         Blood Pressure from Last 3 Encounters:   04/17/18 90/64   03/12/18 94/66   03/08/18 100/60    Weight from Last 3 Encounters:   04/17/18 166 lb (75.3 kg)   03/12/18 172 lb (78 kg)   03/08/18 177 lb (80.3 kg)              We Performed the Following     CHIROPRAC MANIP,SPINAL,1-2 REGIONS        Primary Care Provider Office Phone # Fax #    Seymour Alcantar [phone] [address] [phone]        Equal Access to Services     ERIC OCONNELL : Killian Arvizu, waorlando dimas, lionel kaalmagregorio kahn, sherice gutierrez . So [facility] [phone].    ATENCIÓN: Si habla español, tiene a moody disposición servicios gratuitos de asistencia lingüística. Llame al [phone].    We comply with applicable federal civil rights laws and [place] laws. We do not discriminate on the basis of race, color, national origin, age, disability, sex, sexual orientation, or gender identity.            Thank you!     Thank you for choosing  [facility]  for your care. Our goal is always to provide you with excellent care. Hearing back from our patients is one way we can continue to improve our services. Please take a few minutes to complete the written survey that you may receive in the mail after your visit with us. Thank you!             Your Updated Medication List - Protect others around you: Learn how to safely use, store and throw away your medicines at www.disposemymeds.org.          This list is accurate as of 6/20/18 11:59 PM.  Always use your most recent med list.                   Brand Name Dispense Instructions for use Diagnosis    calcium carbonate 500 MG tablet    OS-TANIA 500 mg [facility]. Ca     Take 1 tablet by mouth 2 times daily        etonogestrel 68 MG Impl    IMPLANON/NEXPLANON     1 each (68 mg) by Subdermal route continuous     Family planning       omega-3 acid ethyl esters 1 g capsule    Lovaza     Take 2 g by mouth 2 times daily        prenatal multivitamin plus iron 27-0.8 MG Tabs per tablet      Take 1 tablet by mouth daily    Family planning, Depression with anxiety       TYLENOL PO      Take 500 mg by mouth        VITAMIN D (CHOLECALCIFEROL) PO      Take 1,000 Units by mouth daily

## 2018-06-21 NOTE — PROGRESS NOTES
Subjective Finding:    Chief compalint: Patient presents with:  Back Pain  , Pain Scale: 6/10, Intensity: sharp, Duration: 1 years, Radiating: no.    Date of injury:     Activities that the pain restricts:   Home/household/hobbies/social activities: yes.  Work duties: yes.  Sleep: yes.  Makes symptoms better: rest.  Makes symptoms worse: sitting.  Have you seen anyone else for the symptoms? Yes: MD and GEORGETTE.  Work related: no.  Automobile related injury: no.    Objective and Assessment:    Posture Analysis:   High shoulder: .  Head tilt: .  High iliac crest: .  Head carriage: neutral.  Thoracic Kyphosis: forward.  Lumbar Lordosis: forward.    Lumbar Range of Motion: extension decreased.  Cervical Range of Motion: extension decreased.  Thoracic Range of Motion: extension decreased.  Extremity Range of Motion: .    Palpation:   Quad lumb: bilateral, referred pain: no  Traps: sharp pain, referred pain: no    Segmental dysfunction pre-treatment and treatment area: L45  T78.    Assessment post-treatment:  Cervical: ROM increased.  Thoracic: ROM increased.  Lumbar: ROM increased.    Comments: DDd of lumbar area.      Complicating Factors: structural abnormalities.    Procedure(s):  CMT:  08631 Chiropractic manipulative treatment 3-4 regions performed   Cervical: Diversified, See above for level, Supine, Thoracic: Diversified, See above for level, Prone and Lumbar: Diversified, See above for level, Side posture    Modalities:  None performed this visit    Therapeutic procedures:  None    Plan:  Treatment plan: PRN.  Instructed patient: stretch as instructed at visit.  Short term goals: increase ROM.  Long term goals: pain releif.  Prognosis: very good.

## 2018-06-28 ENCOUNTER — OFFICE VISIT (OUTPATIENT)
Dept: CHIROPRACTIC MEDICINE | Facility: OTHER | Age: 38
End: 2018-06-28
Attending: CHIROPRACTOR
Payer: COMMERCIAL

## 2018-06-28 DIAGNOSIS — M54.50 ACUTE BILATERAL LOW BACK PAIN WITHOUT SCIATICA: ICD-10-CM

## 2018-06-28 DIAGNOSIS — M99.03 SEGMENTAL AND SOMATIC DYSFUNCTION OF LUMBAR REGION: Primary | ICD-10-CM

## 2018-06-28 DIAGNOSIS — M99.02 SEGMENTAL AND SOMATIC DYSFUNCTION OF THORACIC REGION: ICD-10-CM

## 2018-06-28 DIAGNOSIS — M99.01 SEGMENTAL AND SOMATIC DYSFUNCTION OF CERVICAL REGION: ICD-10-CM

## 2018-06-28 PROCEDURE — 98940 CHIROPRACT MANJ 1-2 REGIONS: CPT | Mod: AT | Performed by: CHIROPRACTOR

## 2018-06-28 NOTE — PROGRESS NOTES
Subjective Finding:    Chief compalint: Patient presents with:  Back Pain  , Pain Scale: 6/10, Intensity: sharp, Duration: 1 years, Radiating: no.    Date of injury:     Activities that the pain restricts:   Home/household/hobbies/social activities: yes.  Work duties: yes.  Sleep: yes.  Makes symptoms better: rest.  Makes symptoms worse: sitting.  Have you seen anyone else for the symptoms? Yes: MD and GEOREGTTE.  Work related: no.  Automobile related injury: no.    Objective and Assessment:    Posture Analysis:   High shoulder: .  Head tilt: .  High iliac crest: .  Head carriage: neutral.  Thoracic Kyphosis: forward.  Lumbar Lordosis: forward.    Lumbar Range of Motion: extension decreased.  Cervical Range of Motion: extension decreased.  Thoracic Range of Motion: extension decreased.  Extremity Range of Motion: .    Palpation:   Quad lumb: bilateral, referred pain: no  Traps: sharp pain, referred pain: no    Segmental dysfunction pre-treatment and treatment area: L45  T78.    Assessment post-treatment:  Cervical: ROM increased.  Thoracic: ROM increased.  Lumbar: ROM increased.    Comments: DDd of lumbar area.      Complicating Factors: structural abnormalities.    Procedure(s):  CMT:  74782 Chiropractic manipulative treatment 3-4 regions performed   Cervical: Diversified, See above for level, Supine, Thoracic: Diversified, See above for level, Prone and Lumbar: Diversified, See above for level, Side posture    Modalities:  None performed this visit    Therapeutic procedures:  None    Plan:  Treatment plan: PRN.  Instructed patient: stretch as instructed at visit.  Short term goals: increase ROM.  Long term goals: pain releif.  Prognosis: very good.

## 2018-06-28 NOTE — MR AVS SNAPSHOT
After Visit Summary   6/28/2018    Joaquina Perez    MRN: [id number]           Patient Information     Date Of Birth          1980        Visit Information        Provider Department      6/28/2018 9:30 AM Adam Smyth DC  [facility] Caraballo        Today's Diagnoses     Segmental and somatic dysfunction of lumbar region    -  1    Acute bilateral low back pain without sciatica        Segmental and somatic dysfunction of thoracic region        Segmental and somatic dysfunction of cervical region           Follow-ups after your visit        Your next 10 appointments already scheduled     Jul 09, 2018  2:30 PM CDT   (Arrive by 2:15 PM)   PHYSICAL with MANASA Barriga CNM   [facility]    [address]  [place] [address]   [phone]              Who to contact     If you have questions or need follow up information about today's clinic visit or your schedule please contact  [facility] DAVID CARABALLO directly at [phone].  Normal or non-critical lab and imaging results will be communicated to you by ePod Solarhart, letter or phone within 4 business days after the clinic has received the results. If you do not hear from us within 7 days, please contact the clinic through CrowdMobt or phone. If you have a critical or abnormal lab result, we will notify you by phone as soon as possible.  Submit refill requests through Spotlight At Night or call your pharmacy and they will forward the refill request to us. Please allow 3 business days for your refill to be completed.          Additional Information About Your Visit        MyChart Information     Spotlight At Night gives you secure access to your electronic health record. If you see a primary care provider, you can also send messages to your care team and make appointments. If you have questions, please call your primary care clinic.  If you do not have a primary care provider, please call [phone] and they  will assist you.        Care EveryWhere ID     This is your Care EveryWhere ID. This could be used by other organizations to access your [place] medical records  [id number]         Blood Pressure from Last 3 Encounters:   04/17/18 90/64   03/12/18 94/66   03/08/18 100/60    Weight from Last 3 Encounters:   04/17/18 166 lb (75.3 kg)   03/12/18 172 lb (78 kg)   03/08/18 177 lb (80.3 kg)              We Performed the Following     CHIROPRAC MANIP,SPINAL,3-4 REGIONS        Primary Care Provider Office Phone # Fax #    Seymour Alcantar [phone]       [facility] [address]        Equal Access to Services     ENA OCONNELL : Hadii aad ku hadasho Sotalia, waaxda luqadaha, qaybta kaalmada adeegyada, sherice gutierrez . So [facility] [phone].    ATENCIÓN: Si habla español, tiene a moody disposición servicios gratuitos de asistencia lingüística. [facility] [phone].    We comply with applicable federal civil rights laws and [place] laws. We do not discriminate on the basis of race, color, national origin, age, disability, sex, sexual orientation, or gender identity.            Thank you!     Thank you for choosing  [facility]  for your care. Our goal is always to provide you with excellent care. Hearing back from our patients is one way we can continue to improve our services. Please take a few minutes to complete the written survey that you may receive in the mail after your visit with us. Thank you!             Your Updated Medication List - Protect others around you: Learn how to safely use, store and throw away your medicines at www.disposemymeds.org.          This list is accurate as of 6/28/18  1:45 PM.  Always use your most recent med list.                   Brand Name Dispense Instructions for use Diagnosis    calcium carbonate 500 MG tablet    OS-TANIA 500 mg [place]. Ca     Take 1 tablet by mouth 2 times daily        etonogestrel 68 MG Impl     IMPLANON/NEXPLANON     1 each (68 mg) by Subdermal route continuous    Family planning       omega-3 acid ethyl esters 1 g capsule    Lovaza     Take 2 g by mouth 2 times daily        prenatal multivitamin plus iron 27-0.8 MG Tabs per tablet      Take 1 tablet by mouth daily    Family planning, Depression with anxiety       TYLENOL PO      Take 500 mg by mouth        VITAMIN D (CHOLECALCIFEROL) PO      Take 1,000 Units by mouth daily

## 2018-07-06 ENCOUNTER — TELEPHONE (OUTPATIENT)
Dept: FAMILY MEDICINE | Facility: OTHER | Age: 38
End: 2018-07-06

## 2018-07-06 NOTE — TELEPHONE ENCOUNTER
"8:25 AM    Reason for Call: OVERBOOK    Patient is having the following symptoms: patient is working with another doctor for back issues and he/she suggested that she be seen by you.  Experiencing a lot of muscle cramps \"to run blood work... Not sure if I even need an appointment\"    The patient is requesting an appointment for today or early next week with Dr Alcantar.    Was an appointment offered for this call? No  If yes : Appointment type              Date    Preferred method for responding to this message: Telephone Call     What is your phone number [phone]    If we cannot reach you directly, may we leave a detailed response at the number you provided? Yes    Can this message wait until your PCP/provider returns, if unavailable today? Not applicable, provider is in    [place] Bernardo    "

## 2018-07-09 ENCOUNTER — OFFICE VISIT (OUTPATIENT)
Dept: FAMILY MEDICINE | Facility: OTHER | Age: 38
End: 2018-07-09
Attending: FAMILY MEDICINE
Payer: COMMERCIAL

## 2018-07-09 VITALS
BODY MASS INDEX: 27.96 KG/M2 | HEART RATE: 82 BPM | DIASTOLIC BLOOD PRESSURE: 68 MMHG | SYSTOLIC BLOOD PRESSURE: 102 MMHG | OXYGEN SATURATION: 100 % | WEIGHT: 168 LBS

## 2018-07-09 DIAGNOSIS — M79.605 BILATERAL LEG PAIN: ICD-10-CM

## 2018-07-09 DIAGNOSIS — M79.604 BILATERAL LEG PAIN: ICD-10-CM

## 2018-07-09 DIAGNOSIS — F41.1 GAD (GENERALIZED ANXIETY DISORDER): Primary | ICD-10-CM

## 2018-07-09 LAB
ALBUMIN SERPL-MCNC: 4.3 G/DL (ref 3.4–5)
ALP SERPL-CCNC: 45 U/L (ref 40–150)
ALT SERPL W P-5'-P-CCNC: 21 U/L (ref 0–50)
ANION GAP SERPL CALCULATED.3IONS-SCNC: 8 MMOL/L (ref 3–14)
AST SERPL W P-5'-P-CCNC: 14 U/L (ref 0–45)
BASOPHILS # BLD AUTO: 0 10E9/L (ref 0–0.2)
BASOPHILS NFR BLD AUTO: 0.5 %
BILIRUB SERPL-MCNC: 0.2 MG/DL (ref 0.2–1.3)
BUN SERPL-MCNC: 12 MG/DL (ref 7–30)
CALCIUM SERPL-MCNC: 9 MG/DL (ref 8.5–10.1)
CHLORIDE SERPL-SCNC: 109 MMOL/L (ref 94–109)
CK SERPL-CCNC: 88 U/L (ref 30–225)
CO2 SERPL-SCNC: 25 MMOL/L (ref 20–32)
CREAT SERPL-MCNC: 0.9 MG/DL (ref 0.52–1.04)
DIFFERENTIAL METHOD BLD: NORMAL
EOSINOPHIL # BLD AUTO: 0 10E9/L (ref 0–0.7)
EOSINOPHIL NFR BLD AUTO: 0.5 %
ERYTHROCYTE [DISTWIDTH] IN BLOOD BY AUTOMATED COUNT: 12.2 % (ref 10–15)
ERYTHROCYTE [SEDIMENTATION RATE] IN BLOOD BY WESTERGREN METHOD: 8 MM/H (ref 0–20)
GFR SERPL CREATININE-BSD FRML MDRD: 69 ML/MIN/1.7M2
GLUCOSE SERPL-MCNC: 97 MG/DL (ref 70–99)
HCT VFR BLD AUTO: 39.6 % (ref 35–47)
HGB BLD-MCNC: 13.7 G/DL (ref 11.7–15.7)
IMM GRANULOCYTES # BLD: 0 10E9/L (ref 0–0.4)
IMM GRANULOCYTES NFR BLD: 0.2 %
LYMPHOCYTES # BLD AUTO: 1.5 10E9/L (ref 0.8–5.3)
LYMPHOCYTES NFR BLD AUTO: 24.2 %
MCH RBC QN AUTO: 32.1 PG (ref 26.5–33)
MCHC RBC AUTO-ENTMCNC: 34.6 G/DL (ref 31.5–36.5)
MCV RBC AUTO: 93 FL (ref 78–100)
MONOCYTES # BLD AUTO: 0.3 10E9/L (ref 0–1.3)
MONOCYTES NFR BLD AUTO: 5.3 %
NEUTROPHILS # BLD AUTO: 4.2 10E9/L (ref 1.6–8.3)
NEUTROPHILS NFR BLD AUTO: 69.3 %
NRBC # BLD AUTO: 0 10*3/UL
NRBC BLD AUTO-RTO: 0 /100
PLATELET # BLD AUTO: 256 10E9/L (ref 150–450)
POTASSIUM SERPL-SCNC: 4 MMOL/L (ref 3.4–5.3)
PROT SERPL-MCNC: 7.7 G/DL (ref 6.8–8.8)
RBC # BLD AUTO: 4.27 10E12/L (ref 3.8–5.2)
SODIUM SERPL-SCNC: 142 MMOL/L (ref 133–144)
TSH SERPL DL<=0.005 MIU/L-ACNC: 1.42 MU/L (ref 0.4–4)
WBC # BLD AUTO: 6 10E9/L (ref 4–11)

## 2018-07-09 PROCEDURE — 80053 COMPREHEN METABOLIC PANEL: CPT | Mod: ZL | Performed by: FAMILY MEDICINE

## 2018-07-09 PROCEDURE — 99214 OFFICE O/P EST MOD 30 MIN: CPT | Performed by: FAMILY MEDICINE

## 2018-07-09 PROCEDURE — 82550 ASSAY OF CK (CPK): CPT | Mod: ZL | Performed by: FAMILY MEDICINE

## 2018-07-09 PROCEDURE — 85025 COMPLETE CBC W/AUTO DIFF WBC: CPT | Mod: ZL | Performed by: FAMILY MEDICINE

## 2018-07-09 PROCEDURE — 36415 COLL VENOUS BLD VENIPUNCTURE: CPT | Mod: ZL | Performed by: FAMILY MEDICINE

## 2018-07-09 PROCEDURE — 99000 SPECIMEN HANDLING OFFICE-LAB: CPT | Performed by: FAMILY MEDICINE

## 2018-07-09 PROCEDURE — 84443 ASSAY THYROID STIM HORMONE: CPT | Mod: ZL | Performed by: FAMILY MEDICINE

## 2018-07-09 PROCEDURE — 85652 RBC SED RATE AUTOMATED: CPT | Mod: ZL | Performed by: FAMILY MEDICINE

## 2018-07-09 PROCEDURE — 82306 VITAMIN D 25 HYDROXY: CPT | Mod: ZL | Performed by: FAMILY MEDICINE

## 2018-07-09 PROCEDURE — G0463 HOSPITAL OUTPT CLINIC VISIT: HCPCS

## 2018-07-09 RX ORDER — CYCLOBENZAPRINE HCL 10 MG
5 TABLET ORAL
COMMUNITY
Start: 2018-05-02 | End: 2018-07-16

## 2018-07-09 RX ORDER — DULOXETIN HYDROCHLORIDE 30 MG/1
CAPSULE, DELAYED RELEASE ORAL
Qty: 60 CAPSULE | Refills: 1 | Status: SHIPPED | OUTPATIENT
Start: 2018-07-09 | End: 2018-09-10

## 2018-07-09 ASSESSMENT — ANXIETY QUESTIONNAIRES
3. WORRYING TOO MUCH ABOUT DIFFERENT THINGS: SEVERAL DAYS
IF YOU CHECKED OFF ANY PROBLEMS ON THIS QUESTIONNAIRE, HOW DIFFICULT HAVE THESE PROBLEMS MADE IT FOR YOU TO DO YOUR WORK, TAKE CARE OF THINGS AT HOME, OR GET ALONG WITH OTHER PEOPLE: NOT DIFFICULT AT ALL
4. TROUBLE RELAXING: NOT AT ALL
2. NOT BEING ABLE TO STOP OR CONTROL WORRYING: SEVERAL DAYS
5. BEING SO RESTLESS THAT IT IS HARD TO SIT STILL: NOT AT ALL
1. FEELING NERVOUS, ANXIOUS, OR ON EDGE: SEVERAL DAYS
GAD7 TOTAL SCORE: 4
6. BECOMING EASILY ANNOYED OR IRRITABLE: NOT AT ALL
7. FEELING AFRAID AS IF SOMETHING AWFUL MIGHT HAPPEN: SEVERAL DAYS

## 2018-07-09 ASSESSMENT — PAIN SCALES - GENERAL: PAINLEVEL: MODERATE PAIN (5)

## 2018-07-09 NOTE — PROGRESS NOTES
SUBJECTIVE:                                                    Joaquina Perez is a 38 year old female who presents to clinic today for the following health issues:        Leg cramps      Duration: 3 weeks or so    Description (location/character/radiation): both legs    Intensity:  mild    Accompanying signs and symptoms: has had spinal issues in the past spoke with PMR doctors and they recommended she see PCP for labs and see if possibly related to spine or something new. Its off an on. After walking alot but happens at rest a lot. Has always had trouble with back of thighs but upper thighs are new thing.    History (similar episodes/previous evaluation): history of spinal and leg problems    Precipitating or alleviating factors: rest, ice and stretching    Therapies tried and outcome: None    Care everywhere reviewed - lots of info.     Has been seeing PMR    Pt is c/o bilateral hip pain and pt is wondering if due to hip issue.     LAINE on June 4th - not help    Has plans of Facet injection on July 30th    Pt states she is having new issues of now cramping/ burning achiness  of bilateral quads along with the chronic hamstring area. This upper quad pain is intermittent - no pattern     PEYTON flares with increase pain - feels has MS or ALS     Pt is doing HEP/stretch and foam roll along with walking        PEYTON-7 SCORE 3/12/2018 4/17/2018 7/9/2018   Total Score 2 3 4           Problem list and histories reviewed & adjusted, as indicated.  Additional history: as documented    Labs reviewed in EPIC    ROS:  CONSTITUTIONAL: NEGATIVE for fever, chills, change in weight  RESP: NEGATIVE for significant cough or SOB  CV: NEGATIVE for chest pain, palpitations or peripheral edema    OBJECTIVE:                                                    /68  Pulse 82  Wt 168 lb (76.2 kg)  SpO2 100%  BMI 27.96 kg/m2  Body mass index is 27.96 kg/(m^2).   GENERAL: healthy, alert, well nourished, well hydrated, no  distress  MS: extremities- no gross deformities noted, no edema  Good ROM of hips.  BACK: no CVA tenderness, no paralumbar tenderness  Psych very anxious - frustartated     ASSESSMENT/PLAN:                                                        ICD-10-CM    1. PEYTON (generalized anxiety disorder) F41.1 TURMERIC PO     cyclobenzaprine (FLEXERIL) 10 MG tablet     MAGNESIUM PO     DULoxetine (CYMBALTA) 30 MG EC capsule     Comprehensive metabolic panel     CBC with platelets differential     TSH     Erythrocyte sedimentation rate auto     CK total     Vitamin D Deficiency   2. Bilateral leg pain M79.604 TURMERIC PO    M79.605 cyclobenzaprine (FLEXERIL) 10 MG tablet     MAGNESIUM PO     DULoxetine (CYMBALTA) 30 MG EC capsule     Comprehensive metabolic panel     CBC with platelets differential     TSH     Erythrocyte sedimentation rate auto     CK total     Vitamin D Deficiency     Discussed at length.  Will order some labs since pt is adamant on it.  PEYTON playing large role in this.  Will let PMR do their thing.  discussed at length her PEYTON and how it is playing role in this. Continue current medications and behavioral changes.   Pt agrees to try Cymbalta. Reassurance givnen. Discussed in length conservative measures of OTC medications for pain, Ice/Heat, elevation and the concept of R.I.C.E.. Continue behavioral changes and proper body mechanics to allow for healing. Follow up as directed.   Symptomatic treatment was discussed along when patient should call and/or come back into the clinic or go to ER/Urgent care. All questions answered.   'f/u in 4 weeks.   30minutes was spent with patient and over 50%  of this time was spent on counseling patient regarding  illness, medication and / or treatment  options, coordinating further cares and follow ups that are needed along with resource material that will be helpful in the treatment of these issues.     See Patient Instructions    Seymour Alcantar MD  [facility]  [place]

## 2018-07-09 NOTE — MR AVS SNAPSHOT
After Visit Summary   7/9/2018    Joaquina Perez    MRN: [id number]           Patient Information     Date Of Birth          1980        Visit Information        Provider Department      7/9/2018 7:45 AM Seymour Alcantar MD [facility]        Today's Diagnoses     PEYTON (generalized anxiety disorder)    -  1    Bilateral leg pain           Follow-ups after your visit        Your next 10 appointments already scheduled     Jul 09, 2018  2:30 PM CDT   (Arrive by 2:15 PM)   PHYSICAL with MANASA Barriga CNM   [facility] ([facility] )    [address] [facility] [address]   [phone]            Jul 12, 2018 10:15 AM CDT   (Arrive by 10:00 AM)   [facility]   [place] [facility] ([facility] )    [address] [id number] [phone]           Take your medicines as usual, unless your doctor tells you not to. Bring a list of your current medicines to your exam (including vitamins, minerals and over-the-counter drugs). Also bring the results of similar scans you may have had.  Please remove any body piercings and hair extensions before you arrive.  Follow your doctor s orders. If you do not, we may have to postpone your exam.  You may or may not receive IV contrast for this exam pending the discretion of the Radiologist.  You do not need to do anything special to prepare.  The MRI machine uses a strong magnet. Please wear clothes without metal (snaps, zippers). A sweatsuit works well, or we may give you a hospital gown.   **IMPORTANT** THE INSTRUCTIONS BELOW ARE ONLY FOR THOSE PATIENTS WHO HAVE BEEN PRESCRIBED SEDATION OR GENERAL ANESTHESIA DURING THEIR MRI PROCEDURE:  IF YOUR DOCTOR PRESCRIBED ORAL SEDATION (take medicine to help you relax during your exam):   You must get the medicine from your doctor (oral medication) before you arrive. Bring the medicine to the exam. Do not take it at home.  You ll be told when to take it upon arriving for your exam.   Arrive one hour early. Bring someone who can take you home after the test. Your medicine will make you sleepy. After the exam, you may not drive, take a bus or take a taxi by yourself.  IF YOUR DOCTOR PRESCRIBED IV SEDATION:   Arrive one hour early. Bring someone who can take you home after the test. Your medicine will make you sleepy. After the exam, you may not drive, take a bus or take a taxi by yourself.   No eating 6 hours before your exam. You may have clear liquids up until 4 hours before your exam. (Clear liquids include water, clear tea, black coffee and fruit juice without pulp.)  IF YOUR DOCTOR PRESCRIBED ANESTHESIA (be asleep for your exam):   Arrive 1 1/2 hours early. Bring someone who can take you home after the test. You may not drive, take a bus or take a taxi by yourself.   No eating 8 hours before your exam. You may have clear liquids up until 4 hours before your exam. (Clear liquids include water, clear tea, black coffee and fruit juice without pulp.)   You will spend four to five hours in the recovery room.  Please call the Imaging Department at your exam site with any questions.              Who to contact     If you have questions or need follow up information about today's clinic visit or your schedule please contact [facility] directly at [phone].  Normal or non-critical lab and imaging results will be communicated to you by MyChart, letter or phone within 4 business days after the clinic has received the results. If you do not hear from us within 7 days, please contact the clinic through [employer] or phone. If you have a critical or abnormal lab result, we will notify you by phone as soon as possible.  Submit refill requests through [employer] or call your pharmacy and they will forward the refill request to us. Please allow 3 business days for your refill to be completed.          Additional Information About Your  Visit        [place] Information     Seer gives you secure access to your electronic health record. If you see a primary care provider, you can also send messages to your care team and make appointments. If you have questions, please call your primary care clinic.  If you do not have a primary care provider, please call [phone] and they will assist you.        Care EveryWhere ID     This is your Care EveryWhere ID. This could be used by other organizations to access your [place] medical records  [id number]        Your Vitals Were     Pulse Pulse Oximetry BMI (Body Mass Index)             82 100% 27.96 kg/m2          Blood Pressure from Last 3 Encounters:   07/09/18 102/68   04/17/18 90/64   03/12/18 94/66    Weight from Last 3 Encounters:   07/09/18 168 lb (76.2 kg)   04/17/18 166 lb (75.3 kg)   03/12/18 172 lb (78 kg)              We Performed the Following     CBC with platelets differential     CK total     Comprehensive metabolic panel     Erythrocyte sedimentation rate auto     TSH     Vitamin D Deficiency          Today's Medication Changes          These changes are accurate as of 7/9/18  8:28 AM.  If you have any questions, ask your nurse or doctor.               Start taking these medicines.        Dose/Directions    DULoxetine 30 MG EC capsule   Commonly known as:  CYMBALTA   Used for:  PEYTON (generalized anxiety disorder), Bilateral leg pain   Started by:  Seymour Alcantar MD        1 tab orally in morning for 1-2 weeks then 1 tab twice daily   Quantity:  60 capsule   Refills:  1            Where to get your medicines      [facility] [address] [place] [address] [place] - [address] AT [facility] [address], [place] [address]     Phone:  [phone]     DULoxetine 30 MG EC capsule                Primary Care Provider Office Phone # Fax #    Seymour Alcantar [phone]       [facility]  [place] [address]        Equal Access  to Services     ENA OCONNELL : Killian Arvizu, waorlando dimas, qaselvinvirginie thornealsherice willett. So [facility] [phone].    ATENCIÓN: Si paulola ky, tiene a moody disposición servicios gratuitos de asistencia lingüística. Llame al [phone].    We comply with applicable federal civil rights laws and [place] laws. We do not discriminate on the basis of race, color, national origin, age, disability, sex, sexual orientation, or gender identity.            Thank you!     Thank you for choosing [facility]  for your care. Our goal is always to provide you with excellent care. Hearing back from our patients is one way we can continue to improve our services. Please take a few minutes to complete the written survey that you may receive in the mail after your visit with us. Thank you!             Your Updated Medication List - Protect others around you: Learn how to safely use, store and throw away your medicines at www.disposemymeds.org.          This list is accurate as of 7/9/18  8:28 AM.  Always use your most recent med list.                   Brand Name Dispense Instructions for use Diagnosis    calcium carbonate 500 MG tablet    OS-TANIA 500 mg [place]. [place]     Take 1 tablet by mouth 2 times daily        cyclobenzaprine 10 MG tablet    FLEXERIL     Take 5 mg by mouth    PEYTON (generalized anxiety disorder), Bilateral leg pain       DULoxetine 30 MG EC capsule    CYMBALTA    60 capsule    1 tab orally in morning for 1-2 weeks then 1 tab twice daily    PEYTON (generalized anxiety disorder), Bilateral leg pain       etonogestrel 68 MG Impl    IMPLANON/NEXPLANON     1 each (68 mg) by Subdermal route continuous    Family planning       MAGNESIUM PO       PEYTON (generalized anxiety disorder), Bilateral leg pain       omega-3 acid ethyl esters 1 g capsule    Lovaza     Take 2 g by mouth 2 times daily        prenatal multivitamin plus iron 27-0.8 MG Tabs per tablet       Take 1 tablet by mouth daily    Family planning, Depression with anxiety       TURMERIC PO       PEYTON (generalized anxiety disorder), Bilateral leg pain       TYLENOL PO      Take 500 mg by mouth        VITAMIN D (CHOLECALCIFEROL) PO      Take 1,000 Units by mouth daily

## 2018-07-09 NOTE — NURSING NOTE
"Chief Complaint   Patient presents with     Back Pain       Initial /68  Pulse 82  Wt 168 lb (76.2 kg)  SpO2 100%  BMI 27.96 kg/m2 Estimated body mass index is 27.96 kg/(m^2) as calculated from the following:    Height as of 4/17/18: 5' 5\" (1.651 m).    Weight as of this encounter: 168 lb (76.2 kg).  Medication Reconciliation: complete    Paulina Kearns MA    "

## 2018-07-10 LAB — DEPRECATED CALCIDIOL+CALCIFEROL SERPL-MC: 40 UG/L (ref 20–75)

## 2018-07-10 ASSESSMENT — PATIENT HEALTH QUESTIONNAIRE - PHQ9: SUM OF ALL RESPONSES TO PHQ QUESTIONS 1-9: 2

## 2018-07-10 ASSESSMENT — ANXIETY QUESTIONNAIRES: GAD7 TOTAL SCORE: 4

## 2018-07-12 ENCOUNTER — HOSPITAL ENCOUNTER (OUTPATIENT)
Dept: MRI IMAGING | Facility: HOSPITAL | Age: 38
Discharge: HOME | End: 2018-07-12
Attending: PHYSICAL MEDICINE & REHABILITATION | Admitting: PHYSICAL MEDICINE & REHABILITATION
Payer: COMMERCIAL

## 2018-07-12 DIAGNOSIS — M54.16 LUMBAR RADICULOPATHY: ICD-10-CM

## 2018-07-12 DIAGNOSIS — M51.369 DDD (DEGENERATIVE DISC DISEASE), LUMBAR: ICD-10-CM

## 2018-07-12 PROCEDURE — 72148 MRI LUMBAR SPINE W/O DYE: CPT | Mod: TC

## 2018-07-16 ENCOUNTER — OFFICE VISIT (OUTPATIENT)
Dept: FAMILY MEDICINE | Facility: OTHER | Age: 38
End: 2018-07-16
Attending: FAMILY MEDICINE
Payer: COMMERCIAL

## 2018-07-16 ENCOUNTER — TELEPHONE (OUTPATIENT)
Dept: FAMILY MEDICINE | Facility: OTHER | Age: 38
End: 2018-07-16

## 2018-07-16 VITALS
SYSTOLIC BLOOD PRESSURE: 112 MMHG | OXYGEN SATURATION: 98 % | HEIGHT: 65 IN | TEMPERATURE: 99.1 F | WEIGHT: 163 LBS | BODY MASS INDEX: 27.16 KG/M2 | HEART RATE: 90 BPM | DIASTOLIC BLOOD PRESSURE: 78 MMHG

## 2018-07-16 DIAGNOSIS — F41.1 GAD (GENERALIZED ANXIETY DISORDER): Primary | ICD-10-CM

## 2018-07-16 DIAGNOSIS — M79.10 MYALGIA: ICD-10-CM

## 2018-07-16 DIAGNOSIS — F45.9 PSYCHOSOMATIC DISORDER: ICD-10-CM

## 2018-07-16 DIAGNOSIS — F42.9 OBSESSIVE-COMPULSIVE DISORDER, UNSPECIFIED TYPE: ICD-10-CM

## 2018-07-16 PROCEDURE — G0463 HOSPITAL OUTPT CLINIC VISIT: HCPCS

## 2018-07-16 PROCEDURE — 99215 OFFICE O/P EST HI 40 MIN: CPT | Performed by: FAMILY MEDICINE

## 2018-07-16 RX ORDER — CLONAZEPAM 1 MG/1
0.5-1 TABLET ORAL 2 TIMES DAILY PRN
Qty: 60 TABLET | Refills: 0 | Status: SHIPPED | OUTPATIENT
Start: 2018-07-16 | End: 2018-09-18

## 2018-07-16 ASSESSMENT — ANXIETY QUESTIONNAIRES
1. FEELING NERVOUS, ANXIOUS, OR ON EDGE: SEVERAL DAYS
2. NOT BEING ABLE TO STOP OR CONTROL WORRYING: SEVERAL DAYS
4. TROUBLE RELAXING: SEVERAL DAYS
GAD7 TOTAL SCORE: 8
5. BEING SO RESTLESS THAT IT IS HARD TO SIT STILL: SEVERAL DAYS
6. BECOMING EASILY ANNOYED OR IRRITABLE: SEVERAL DAYS
7. FEELING AFRAID AS IF SOMETHING AWFUL MIGHT HAPPEN: MORE THAN HALF THE DAYS
3. WORRYING TOO MUCH ABOUT DIFFERENT THINGS: SEVERAL DAYS

## 2018-07-16 ASSESSMENT — PAIN SCALES - GENERAL: PAINLEVEL: MODERATE PAIN (5)

## 2018-07-16 NOTE — MR AVS SNAPSHOT
After Visit Summary   7/16/2018    Joaquina Perez    MRN: [id number]           Patient Information     Date Of Birth          1980        Visit Information        Provider Department      7/16/2018 1:15 PM Seymour Alcantar MD [facility] [place]        Today's Diagnoses     PEYTON (generalized anxiety disorder)    -  1    Obsessive-compulsive disorder, unspecified type        Psychosomatic disorder          Care Instructions    Psychologists/ counselors  [place]  [phone]  [facility]     [phone]  [facility]  [phone]  [facility] [phone]  [facility]     [phone]   [employer]  [phone]  (kids)  [facility] [phone]  (teens)  [facility]   [phone]  Counseling  [facility] [phone]  [facility] [phone]  [facility] [phone]  [facility]      [phone]  [facility] [phone]  [facility]  [phone]  [facility]     [phone]   [facility] [phone]  [facility]  [phone]  Bruno Lee [phone]  Makenna [facility] [phone]  [facility]     [phone]  [facility]     [phone]  [facility]      [phone]  [employer] [facility] [phone]  [place]  Sukumar Oliveriaen  [phone]  [facility]     [phone]  [facility] IDALIA Carpenter     [phone]  [facility]     [phone]  [facility] [phone]      Crisis Text Line  http://www.crisistextline.org       The Crisis Text Line serves anyone, in any type of crisis, providing access to free, 24/7 support and information via the medium people already use and trust:     Here's how it works:  Text HOME to [phone] from anywhere in the [place], anytime, about any type of  crisis.  A live, trained Crisis Counselor receives the text and responds quickly.  The volunteer Crisis Counselor will help you move from a 'hot moment to a cool moment'                            Follow-ups after your visit        Additional Services     PSYCHOLOGY REFERRAL       Your provider has referred you to:    Please be aware that coverage of these services is subject to the terms and limitations of your health insurance plan.  Call member services at your health plan with any benefit or coverage questions.      Please bring the following to your appointment:    >>   Any x-rays, CTs or MRIs which have been performed.  Contact the facility where they were done to arrange for  prior to your scheduled appointment.   >>   List of current medications   >>   This referral request   >>   Any documents/labs given to you for this referral                  Your next 10 appointments already scheduled     Jul 23, 2018  2:00 PM CDT   (Arrive by 1:45 PM)   PHYSICAL with MANASA Barriga CNM   [facility] ([facility] )    [address] [facility] [address]   [phone]              Who to contact     If you have questions or need follow up information about today's clinic visit or your schedule please contact [facility] directly at [phone].  Normal or non-critical lab and imaging results will be communicated to you by MyChart, letter or phone within 4 business days after the clinic has received the results. If you do not hear from us within 7 days, please contact the clinic through MyChart or phone. If you have a critical or abnormal lab result, we will notify you by phone as soon as possible.  Submit refill requests through [employer]lar or call your pharmacy and they will forward the refill request to us. Please allow 3 business days for your refill to be completed.          Additional Information About Your Visit        [employer] Information     [employer] gives  "you secure access to your electronic health record. If you see a primary care provider, you can also send messages to your care team and make appointments. If you have questions, please call your primary care clinic.  If you do not have a primary care provider, please call [phone] and they will assist you.        Care EveryWhere ID     This is your Care EveryWhere ID. This could be used by other organizations to access your [place] medical records  [id number]        Your Vitals Were     Pulse Temperature Height Pulse Oximetry BMI (Body Mass Index)       90 99.1  F (37.3  C) 5' 5\" (1.651 m) 98% 27.12 kg/m2        Blood Pressure from Last 3 Encounters:   07/16/18 112/78   07/09/18 102/68   04/17/18 90/64    Weight from Last 3 Encounters:   07/16/18 163 lb (73.9 kg)   07/09/18 168 lb (76.2 kg)   04/17/18 166 lb (75.3 kg)              We Performed the Following     PSYCHOLOGY REFERRAL          Today's Medication Changes          These changes are accurate as of 7/16/18  2:19 PM.  If you have any questions, ask your nurse or doctor.               Start taking these medicines.        Dose/Directions    clonazePAM 1 MG tablet   Commonly known as:  klonoPIN   Used for:  PEYTON (generalized anxiety disorder), Obsessive-compulsive disorder, unspecified type   Started by:  Seymour Alcantar MD        Dose:  0.5-1 mg   Take 0.5-1 tablets (0.5-1 mg) by mouth 2 times daily as needed for anxiety   Quantity:  60 tablet   Refills:  0         Stop taking these medicines if you haven't already. Please contact your care team if you have questions.     cyclobenzaprine 10 MG tablet   Commonly known as:  FLEXERIL   Stopped by:  Seymour Alcantar MD           VITAMIN D (CHOLECALCIFEROL) PO   Stopped by:  Seymour Alcantar MD                Where to get your medicines      Some of these will need a paper prescription and others can be bought over the counter.  Ask your nurse if you have questions.     Bring a paper prescription for each " of these medications     clonazePAM 1 MG tablet                Primary Care Provider Office Phone # Fax #    Seymour Alcantar [phone] [address] [facility] [address]        Equal Access to Services     ENA OCONNELL : Killian erika rodriguez jessicao Somindiali, waaxda luqadaha, qaybta kaalmada adebetsey, sherice de leon marilynomises johnson laNataliesamira stephen. So [facility] [phone].    ATENCIÓN: Si habla español, tiene a moody disposición servicios gratuitos de asistencia lingüística. [facility] [phone].    We comply with applicable federal civil rights laws and [place] laws. We do not discriminate on the basis of race, color, national origin, age, disability, sex, sexual orientation, or gender identity.            Thank you!     Thank you for choosing [facility]  for your care. Our goal is always to provide you with excellent care. Hearing back from our patients is one way we can continue to improve our services. Please take a few minutes to complete the written survey that you may receive in the mail after your visit with us. Thank you!             Your Updated Medication List - Protect others around you: Learn how to safely use, store and throw away your medicines at www.disposemymeds.org.          This list is accurate as of 7/16/18  2:19 PM.  Always use your most recent med list.                   Brand Name Dispense Instructions for use Diagnosis    calcium carbonate 500 MG tablet    OS-TANIA 500 mg [place]. [place]     Take 1 tablet by mouth 2 times daily        clonazePAM 1 MG tablet    klonoPIN    60 tablet    Take 0.5-1 tablets (0.5-1 mg) by mouth 2 times daily as needed for anxiety    PEYTON (generalized anxiety disorder), Obsessive-compulsive disorder, unspecified type       DULoxetine 30 MG EC capsule    CYMBALTA    60 capsule    1 tab orally in morning for 1-2 weeks then 1 tab twice daily    PEYTON (generalized anxiety disorder), Bilateral leg pain       etonogestrel 68 MG Impl    IMPLANON/NEXPLANON      1 each (68 mg) by Subdermal route continuous    Family planning       MAGNESIUM PO       PEYTON (generalized anxiety disorder), Bilateral leg pain       omega-3 acid ethyl esters 1 g capsule    Lovaza     Take 2 g by mouth 2 times daily        prenatal multivitamin plus iron 27-0.8 MG Tabs per tablet      Take 1 tablet by mouth daily    Family planning, Depression with anxiety       TURMERIC PO       PEYTON (generalized anxiety disorder), Bilateral leg pain       TYLENOL PO      Take 500 mg by mouth

## 2018-07-16 NOTE — PROGRESS NOTES
"  SUBJECTIVE:   Joaquina Perez is a 38 year old female who presents to clinic today for the following health issues:      Musculoskeletal problem/pain      Duration: started on Friday    Description  Location: bilateral arms/ shoulder going down into fingers and jaw pain-- bilateral stabbing and burning pain of upper extremity and then at times correlate with same to leg pain.     Intensity:  moderate    Accompanying signs and symptoms: anxiety    History  Previous similar problem: YES- was in bilateral legs and down into feet  Previous evaluation:  MRI- bulging disk in back    Precipitating or alleviating factors:  Trauma or overuse: no   Aggravating factors include: exercise and anxiety, not sleeping, thinks that there is something wrong    Therapies tried and outcome: nothing    Has been hospitalized twice for PEYTON also just told me she was dx with OCD -- had lots of ruminating thoughts  NOW having similar episode now with her chronic pain.     Lots of stressors going on in life  Has been Klonopin     \"Stabbing /fire /burn\"  As description for bilateral arm pain.            Problem list and histories reviewed & adjusted, as indicated.  Additional history: as documented    Labs reviewed in EPIC    Reviewed and updated as needed this visit by clinical staff       Reviewed and updated as needed this visit by Provider         ROS:  CONSTITUTIONAL: NEGATIVE for fever, chills, change in weight  ENT/MOUTH: NEGATIVE for ear, mouth and throat problems  RESP: NEGATIVE for significant cough or SOB  CV: NEGATIVE for chest pain, palpitations or peripheral edema  ROS otherwise negative    OBJECTIVE:                                                    /78  Pulse 90  Temp 99.1  F (37.3  C)  Ht 5' 5\" (1.651 m)  Wt 163 lb (73.9 kg)  SpO2 98%  BMI 27.12 kg/m2  Body mass index is 27.12 kg/(m^2).   GENERAL: healthy, alert, well nourished, well hydrated, no distress  HENT: ear canals- normal; TMs- normal; Nose- " normal; Mouth- no ulcers, no lesions  NECK: no tenderness, no adenopathy, no asymmetry, no masses, no stiffness; thyroid- normal to palpation  RESP: lungs clear to auscultation - no rales, no rhonchi, no wheezes  CV: regular rates and rhythm, normal S1 S2, no S3 or S4 and no murmur, no click or rub -  ABDOMEN: soft, no tenderness, no  hepatosplenomegaly, no masses, normal bowel sounds  MS: extremities- no gross deformities noted, no edema  SKIN: no suspicious lesions, no rashes  PSYCH: Alert and oriented times 3; speech- coherent , normal rate and volume; able to articulate logical thoughts, able to abstract reason, no tangential thoughts, no hallucinations or delusions, affect- very anxious.          ASSESSMENT/PLAN:                                                        ICD-10-CM    1. PEYTON (generalized anxiety disorder) F41.1 clonazePAM (KLONOPIN) 1 MG tablet     PSYCHOLOGY REFERRAL   2. Obsessive-compulsive disorder, unspecified type F42.9 clonazePAM (KLONOPIN) 1 MG tablet     PSYCHOLOGY REFERRAL   3. Psychosomatic disorder F45.9 PSYCHOLOGY REFERRAL   4. Myalgia M79.1      60 minutes was spent with patient and over 50%  of this time was spent on counseling patient regarding  illness, medication and / or treatment  options, coordinating further cares and follow ups that are needed along with resource material that will be helpful in the treatment of these issues.   Father was here and was very helpful.  This seems like her PEYTON/OCD rumination and overwhelming fear of having something bad  Including her fear she has Fibromyalgia that we discussed at last visit.  Pt is very caught up in reading on her sx - example wants to be tested for mono since can give some of these sx.      Has lots of stress.  Has not been sleeping more than 2-3 hrs due to ruminating on all this. Pt states she is spiraling like when hospitalized twice for PEYTON.  Klonopin has helped in past. Risk and benefits of klonopin was discussed and verbal  consent to proceed was given.   Start klonopin and increase cymbalta soon to 30mg BId.  LOTS of reassuance.  Needs to go back to counseling- she is to check insurance.  Has good support with faterh and dome friends.   out of town for awhile - making things worse.     Pt looking to unload some her stress.  Recommended FMLA and possible job position change.    NIght shift not helping.  - may need to get her off those for awhile.  No work 7/16- 8/2 until we stablilizer better. 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     Consider holding her next LAINE-- I feel she has no significant lumbar pathology that is causing leg sx. First LAINE not help. This is looking more psychosomatic/ FM and I was very blunt with her.  I am worried we are going to harm her with more tests or procedures.      Pt wanted more tests- try to state we will not be able to do enough tests to satisfy her mind she does not have terrible dz.  NO red flags of anythng bad.  Will treat PEYTON/OCD.   F/u in 2-3 weeks.     Symptomatic treatment was discussed along when patient should call and/or come back into the clinic or go to ER/Urgent care. All questions answered.   Needs to get back into counseling and using  her tools she has learned.     See Patient Instructions    Seymour Alcantar MD  [facility]

## 2018-07-16 NOTE — TELEPHONE ENCOUNTER
7:39 AM    Reason for Call: OVERBOOK    Patient is having the following symptoms: pain in legs  have moved to her arms for 3 days.    The patient is requesting an appointment for 07/16/18, wants to see if she could get in with her daughter at 1:00 today with .    Was an appointment offered for this call? No  If yes : Appointment type              Date    Preferred method for responding to this message: Telephone Call  What is your phone number ?[phone]    If we cannot reach you directly, may we leave a detailed response at the number you provided? Yes    Can this message wait until your PCP/provider returns, if unavailable today? Not applicable, PCP is in     Astrid Jim

## 2018-07-16 NOTE — NURSING NOTE
"Chief Complaint   Patient presents with     Pain       Initial /78  Pulse 90  Temp 99.1  F (37.3  C)  Ht 5' 5\" (1.651 m)  Wt 163 lb (73.9 kg)  SpO2 98%  BMI 27.12 kg/m2 Estimated body mass index is 27.12 kg/(m^2) as calculated from the following:    Height as of this encounter: 5' 5\" (1.651 m).    Weight as of this encounter: 163 lb (73.9 kg).  Medication Reconciliation: complete    Kelvin Serra LPN  "

## 2018-07-16 NOTE — PATIENT INSTRUCTIONS
Psychologists/ counselors  [facility]  [phone]  [facility]     [phone]  [facility]  [phone]  [facility] [phone]  [facility]     [phone]   [facility]  [phone]  (kids)  [facility] [phone]  (teens)  [facility]   [phone]  Counseling  [facility] [phone]  [facility] [phone]  [facility] [phone]  [facility]      [phone]  [facility] [phone]  [facility]  [phone]  [facility]     [phone]   [facility] counseling [phone]  Deepthi Mojo  [phone]  Bruno Lee [phone]  Makenna Solomon [phone]  [facility]     [phone]  [facility]     [phone]  [facility]      [phone]  [facility] [phone]  Shirley  Sukumar Walker  [phone]  [facility]     [phone]  [facility] Dr. IDALIA Carpenter     [phone]  [facility]     [phone]  [facility] [phone]      Crisis Text Line  http://www.crisistextline.org       The Crisis Text Line serves anyone, in any type of crisis, providing access to free, 24/7 support and information via the medium people already use and trust:     Here's how it works:  Text HOME to 527-700 from anywhere in the [place], anytime, about any type of crisis.  A live, trained Crisis Counselor receives the text and responds quickly.  The volunteer Crisis Counselor will help you move from a 'hot moment to a cool moment'

## 2018-07-17 ASSESSMENT — ANXIETY QUESTIONNAIRES: GAD7 TOTAL SCORE: 8

## 2018-07-17 ASSESSMENT — PATIENT HEALTH QUESTIONNAIRE - PHQ9: SUM OF ALL RESPONSES TO PHQ QUESTIONS 1-9: 5

## 2018-07-31 ENCOUNTER — OFFICE VISIT (OUTPATIENT)
Dept: FAMILY MEDICINE | Facility: OTHER | Age: 38
End: 2018-07-31
Attending: FAMILY MEDICINE
Payer: COMMERCIAL

## 2018-07-31 VITALS
WEIGHT: 165 LBS | BODY MASS INDEX: 27.49 KG/M2 | HEIGHT: 65 IN | SYSTOLIC BLOOD PRESSURE: 102 MMHG | DIASTOLIC BLOOD PRESSURE: 60 MMHG | HEART RATE: 72 BPM

## 2018-07-31 DIAGNOSIS — F41.1 GAD (GENERALIZED ANXIETY DISORDER): Primary | ICD-10-CM

## 2018-07-31 DIAGNOSIS — F42.8 OTHER OBSESSIVE-COMPULSIVE DISORDERS: ICD-10-CM

## 2018-07-31 DIAGNOSIS — F45.9 PSYCHOSOMATIC DISORDER: ICD-10-CM

## 2018-07-31 PROCEDURE — 99214 OFFICE O/P EST MOD 30 MIN: CPT | Performed by: FAMILY MEDICINE

## 2018-07-31 PROCEDURE — G0463 HOSPITAL OUTPT CLINIC VISIT: HCPCS

## 2018-07-31 ASSESSMENT — ANXIETY QUESTIONNAIRES
7. FEELING AFRAID AS IF SOMETHING AWFUL MIGHT HAPPEN: SEVERAL DAYS
5. BEING SO RESTLESS THAT IT IS HARD TO SIT STILL: NOT AT ALL
3. WORRYING TOO MUCH ABOUT DIFFERENT THINGS: NOT AT ALL
1. FEELING NERVOUS, ANXIOUS, OR ON EDGE: SEVERAL DAYS
6. BECOMING EASILY ANNOYED OR IRRITABLE: NOT AT ALL
4. TROUBLE RELAXING: SEVERAL DAYS
2. NOT BEING ABLE TO STOP OR CONTROL WORRYING: SEVERAL DAYS
IF YOU CHECKED OFF ANY PROBLEMS ON THIS QUESTIONNAIRE, HOW DIFFICULT HAVE THESE PROBLEMS MADE IT FOR YOU TO DO YOUR WORK, TAKE CARE OF THINGS AT HOME, OR GET ALONG WITH OTHER PEOPLE: SOMEWHAT DIFFICULT
GAD7 TOTAL SCORE: 4

## 2018-07-31 ASSESSMENT — PAIN SCALES - GENERAL: PAINLEVEL: MILD PAIN (3)

## 2018-07-31 NOTE — PROGRESS NOTES
SUBJECTIVE:   Joaquina Perez is a 38 year old female who presents to clinic today for the following health issues:      Anxiety Follow-Up    Status since last visit: Improved -- doing much better     Other associated symptoms:None    Complicating factors:   Significant life event: No   Current substance abuse: None  Depression symptoms: No  PEYTON-7 SCORE 4/17/2018 7/9/2018 7/16/2018   Total Score 3 4 8     Taking  avg 1-1.5 tabs a day   On Cymbalta  Has appt with counselor      PEYTON-7  Chronic Pain Follow-Up       Type / Location of Pain: back and legs  Analgesia/pain control:       Recent changes:  improved      Overall control: Tolerable with discomfort  Activity level/function:      Daily activities:  Able to do all daily activities    Work:  Unable to work  Adverse effects:  No  Adherance    Taking medication as directed?  Yes    Participating in other treatments: yes  Risk Factors:    Sleep:  Good    Mood/anxiety:  improved    Recent family or social stressors:  none noted    Other aggravating factors: prolonged sitting  PHQ-9 SCORE 4/17/2018 7/9/2018 7/16/2018   Total Score - - -   Total Score 0 2 5     PEYTON-7 SCORE 4/17/2018 7/9/2018 7/16/2018   Total Score 3 4 8     Encounter-Level CSA:     There are no encounter-level csa.          Amount of exercise or physical activity: 6-7 days/week for an average of 15-30 minutes    Problems taking medications regularly: No    Medication side effects: none    Diet: regular (no restrictions)            Problem list and histories reviewed & adjusted, as indicated.  Additional history: as documented    Labs reviewed in EPIC    Reviewed and updated as needed this visit by clinical staff  Tobacco  Allergies  Meds  Med Hx  Surg Hx  Fam Hx  Soc Hx      Reviewed and updated as needed this visit by Provider         ROS:  CONSTITUTIONAL: NEGATIVE for fever, chills, change in weight  RESP: NEGATIVE for significant cough or SOB  CV: NEGATIVE for chest pain, palpitations  "or peripheral edema  ROS otherwise negative    OBJECTIVE:                                                    /60  Pulse 72  Ht 5' 5\" (1.651 m)  Wt 165 lb (74.8 kg)  BMI 27.46 kg/m2  Body mass index is 27.46 kg/(m^2).   GENERAL: healthy, alert, well nourished, well hydrated, no distress  PSYCH: Alert and oriented times 3; speech- coherent , normal rate and volume; able to articulate logical thoughts, able to abstract reason, no tangential thoughts, no hallucinations or delusions, affect- normal-- way less anxious and handling things much better   Most of somatic c/o resolved -- much better insight on anxiety and ocd traits.          ASSESSMENT/PLAN:                                                        ICD-10-CM    1. PEYTON (generalized anxiety disorder) F41.1    2. Other obsessive-compulsive disorders F42.8    3. Psychosomatic disorder F45.9      Dong much better. Much better insight. meds helping. Pain almost all gone. Did get LAINE recently???if helped or not.  She thinks if helped some.  *25-30 minutes was spent with patient and over 50%  of this time was spent on counseling patient regarding  illness, medication and / or treatment  options, coordinating further cares and follow ups that are needed along with resource material that will be helpful in the treatment of these issues.   Continue meds and start counseling.   F/u in 2 months. FMLA filled out          Seymour Alcantar MD  [facility] [place]  "

## 2018-07-31 NOTE — MR AVS SNAPSHOT
After Visit Summary   7/31/2018    Joaquina Perez    MRN: [id number]           Patient Information     Date Of Birth          1980        Visit Information        Provider Department      7/31/2018 4:00 PM Seymour Alcantar MD [facility]        Today's Diagnoses     PEYTON (generalized anxiety disorder)    -  1    Other obsessive-compulsive disorders        Psychosomatic disorder           Follow-ups after your visit        Your next 10 appointments already scheduled     Aug 13, 2018 10:30 AM CDT   (Arrive by 10:15 AM)   [facility] [place] [facility]    [address]  [place] [address]   [phone]            Oct 01, 2018  1:45 PM CDT   (Arrive by 1:30 PM)   SHORT with Seymour Alcantar MD   [facility] ([facility] [place] )    [address]  [place] [address]   [phone]              Who to contact     If you have questions or need follow up information about today's clinic visit or your schedule please contact [facility] directly at [phone].  Normal or non-critical lab and imaging results will be communicated to you by MyChart, letter or phone within 4 business days after the clinic has received the results. If you do not hear from us within 7 days, please contact the clinic through MyChart or phone. If you have a critical or abnormal lab result, we will notify you by phone as soon as possible.  Submit refill requests through [employer] or call your pharmacy and they will forward the refill request to us. Please allow 3 business days for your refill to be completed.          Additional Information About Your Visit        MyChart Information     [employer] gives you secure access to your electronic health record. If you see a primary care provider, you can also send messages to your care team and make appointments. If you have questions, please call your primary  "care clinic.  If you do not have a primary care provider, please call [phone] and they will assist you.        Care EveryWhere ID     This is your Care EveryWhere ID. This could be used by other organizations to access your [place] medical records  [id number]        Your Vitals Were     Pulse Height BMI (Body Mass Index)             72 5' 5\" (1.651 m) 27.46 kg/m2          Blood Pressure from Last 3 Encounters:   07/31/18 102/60   07/16/18 112/78   07/09/18 102/68    Weight from Last 3 Encounters:   07/31/18 165 lb (74.8 kg)   07/16/18 163 lb (73.9 kg)   07/09/18 168 lb (76.2 kg)              Today, you had the following     No orders found for display       Primary Care Provider Office Phone # Fax #    Seymour Alcantar [phone] [address] [address]        Equal Access to Services     [facility] : Hadii erika ku hadasho Soomaali, waaxda luqadaha, qaybta kaalmada adeegyada, sherice gutierrez . So [facility] [phone].    ATENCIÓN: Si habla español, tiene a moody disposición servicios gratuitos de asistencia lingüística. Llame al [phone].    We comply with applicable federal civil rights laws and [place] laws. We do not discriminate on the basis of race, color, national origin, age, disability, sex, sexual orientation, or gender identity.            Thank you!     Thank you for choosing [facility] [place]  for your care. Our goal is always to provide you with excellent care. Hearing back from our patients is one way we can continue to improve our services. Please take a few minutes to complete the written survey that you may receive in the mail after your visit with us. Thank you!             Your Updated Medication List - Protect others around you: Learn how to safely use, store and throw away your medicines at www.disposemymeds.org.          This list is accurate as of 7/31/18  5:15 PM.  Always use your most recent med list.             "       Brand Name Dispense Instructions for use Diagnosis    calcium carbonate 500 MG tablet    OS-TANIA 500 mg [place]. [place]     Take 1 tablet by mouth 2 times daily        clonazePAM 1 MG tablet    klonoPIN    60 tablet    Take 0.5-1 tablets (0.5-1 mg) by mouth 2 times daily as needed for anxiety    PEYTON (generalized anxiety disorder), Obsessive-compulsive disorder, unspecified type       DULoxetine 30 MG EC capsule    CYMBALTA    60 capsule    1 tab orally in morning for 1-2 weeks then 1 tab twice daily    PEYTON (generalized anxiety disorder), Bilateral leg pain       etonogestrel 68 MG Impl    IMPLANON/NEXPLANON     1 each (68 mg) by Subdermal route continuous    Family planning       MAGNESIUM PO       PEYTON (generalized anxiety disorder), Bilateral leg pain       omega-3 acid ethyl esters 1 g capsule    Lovaza     Take 2 g by mouth 2 times daily        prenatal multivitamin plus iron 27-0.8 MG Tabs per tablet      Take 1 tablet by mouth daily    Family planning, Depression with anxiety       TURMERIC PO       PEYTON (generalized anxiety disorder), Bilateral leg pain       TYLENOL PO      Take 500 mg by mouth

## 2018-07-31 NOTE — NURSING NOTE
"Chief Complaint   Patient presents with     Anxiety     Pain       Initial /60  Pulse 72  Ht 5' 5\" (1.651 m)  Wt 165 lb (74.8 kg)  BMI 27.46 kg/m2 Estimated body mass index is 27.46 kg/(m^2) as calculated from the following:    Height as of this encounter: 5' 5\" (1.651 m).    Weight as of this encounter: 165 lb (74.8 kg).  Medication Reconciliation: complete    Kelvin Serra LPN  "

## 2018-08-01 ASSESSMENT — ANXIETY QUESTIONNAIRES: GAD7 TOTAL SCORE: 4

## 2018-08-01 ASSESSMENT — PATIENT HEALTH QUESTIONNAIRE - PHQ9: SUM OF ALL RESPONSES TO PHQ QUESTIONS 1-9: 1

## 2018-08-02 ENCOUNTER — OFFICE VISIT (OUTPATIENT)
Dept: CHIROPRACTIC MEDICINE | Facility: OTHER | Age: 38
End: 2018-08-02
Attending: CHIROPRACTOR
Payer: COMMERCIAL

## 2018-08-02 DIAGNOSIS — M99.02 SEGMENTAL AND SOMATIC DYSFUNCTION OF THORACIC REGION: ICD-10-CM

## 2018-08-02 DIAGNOSIS — M99.03 SEGMENTAL AND SOMATIC DYSFUNCTION OF LUMBAR REGION: Primary | ICD-10-CM

## 2018-08-02 DIAGNOSIS — M54.50 ACUTE RIGHT-SIDED LOW BACK PAIN WITHOUT SCIATICA: ICD-10-CM

## 2018-08-02 PROCEDURE — 98940 CHIROPRACT MANJ 1-2 REGIONS: CPT | Mod: AT | Performed by: CHIROPRACTOR

## 2018-08-02 NOTE — PROGRESS NOTES
Subjective Finding:    Chief compalint: Patient presents with:  Back Pain: right sided.  just had facet injections  , Pain Scale: 6/10, Intensity: sharp, Duration: 1 years, Radiating: no.    Date of injury:     Activities that the pain restricts:   Home/household/hobbies/social activities: yes.  Work duties: yes.  Sleep: yes.  Makes symptoms better: rest.  Makes symptoms worse: sitting.  Have you seen anyone else for the symptoms? Yes: MD and GEORGETTE.  Work related: no.  Automobile related injury: no.    Objective and Assessment:    Posture Analysis:   High shoulder: .  Head tilt: .  High iliac crest: .  Head carriage: neutral.  Thoracic Kyphosis: forward.  Lumbar Lordosis: forward.    Lumbar Range of Motion: extension decreased.  Cervical Range of Motion: extension decreased.  Thoracic Range of Motion: extension decreased.  Extremity Range of Motion: .    Palpation:   Quad lumb: bilateral, referred pain: no  Traps: sharp pain, referred pain: no    Segmental dysfunction pre-treatment and treatment area: L45  T78.    Assessment post-treatment:  Cervical: ROM increased.  Thoracic: ROM increased.  Lumbar: ROM increased.    Comments: DDd of lumbar area.      Complicating Factors: structural abnormalities.    Procedure(s):  CMT:  39327 Chiropractic manipulative treatment 3-4 regions performed   Cervical: Diversified, See above for level, Supine, Thoracic: Diversified, See above for level, Prone and Lumbar: Diversified, See above for level, Side posture    Modalities:  None performed this visit    Therapeutic procedures:  None    Plan:  Treatment plan: PRN.  Instructed patient: stretch as instructed at visit.  Short term goals: increase ROM.  Long term goals: pain releif.  Prognosis: very good.

## 2018-08-02 NOTE — MR AVS SNAPSHOT
After Visit Summary   8/2/2018    Joaquina Perez    MRN: [id number]           Patient Information     Date Of Birth          1980        Visit Information        Provider Department      8/2/2018 2:30 PM Adam Smyth DC  [facility]        Today's Diagnoses     Segmental and somatic dysfunction of lumbar region    -  1    Acute right-sided low back pain without sciatica        Segmental and somatic dysfunction of thoracic region           Follow-ups after your visit        Your next 10 appointments already scheduled     Aug 13, 2018 10:30 AM CDT   (Arrive by 10:15 AM)   PHYSICAL with MANASA Barriga CNM   [facility] [place] ([facility] [place] )    [address]  [place] [address]   [phone]            Oct 01, 2018  1:45 PM CDT   (Arrive by 1:30 PM)   SHORT with [facility]ymour [facility]cantar MD   [facility] [place] [facility]    [address]  [place] [address]   [phone]              Who to contact     If you have questions or need follow up information about today's clinic visit or your schedule please contact  [facility] directly at [phone].  Normal or non-critical lab and imaging results will be communicated to you by MyChart, letter or phone within 4 business days after the clinic has received the results. If you do not hear from us within 7 days, please contact the clinic through MyChart or phone. If you have a critical or abnormal lab result, we will notify you by phone as soon as possible.  Submit refill requests through [email or web address] or call your pharmacy and they will forward the refill request to us. Please allow 3 business days for your refill to be completed.          Additional Information About Your Visit        MyChart Information     [email or web address] gives you secure access to your electronic health record. If you see a primary care provider, you can also send messages to your care team and make  appointments. If you have questions, please call your primary care clinic.  If you do not have a primary care provider, please call [phone] and they will assist you.        Care EveryWhere ID     This is your Care EveryWhere ID. This could be used by other organizations to access your [place] medical records  [id number]         Blood Pressure from Last 3 Encounters:   07/31/18 102/60   07/16/18 112/78   07/09/18 102/68    Weight from Last 3 Encounters:   07/31/18 165 lb (74.8 kg)   07/16/18 163 lb (73.9 kg)   07/09/18 168 lb (76.2 kg)              We Performed the Following     CHIROPRAC MANIP,SPINAL,1-2 REGIONS        Primary Care Provider Office Phone # Fax #    Seymour Alcantar [phone] [address] [id number]        Equal Access to Services     [facility]: Hadii erika ku hadasho [place], waaxda luqadaha, qaybta kaalmada ademoisesyada, sherice gutierrez . So [facility] [phone].    ATENCIÓN: Si habla español, tiene a moody disposición servicios gratuitos de asistencia lingüística. Llame al [phone].    We comply with applicable federal civil rights laws and [place] laws. We do not discriminate on the basis of race, color, national origin, age, disability, sex, sexual orientation, or gender identity.            Thank you!     Thank you for choosing  [facility]  for your care. Our goal is always to provide you with excellent care. Hearing back from our patients is one way we can continue to improve our services. Please take a few minutes to complete the written survey that you may receive in the mail after your visit with us. Thank you!             Your Updated Medication List - Protect others around you: Learn how to safely use, store and throw away your medicines at www.disposemymeds.org.          This list is accurate as of 8/2/18  2:42 PM.  Always use your most recent med list.                   Brand Name Dispense Instructions for  use Diagnosis    calcium carbonate 500 MG tablet    OS-TANIA 500 mg [place]. [place]     Take 1 tablet by mouth 2 times daily        clonazePAM 1 MG tablet    klonoPIN    60 tablet    Take 0.5-1 tablets (0.5-1 mg) by mouth 2 times daily as needed for anxiety    PEYTON (generalized anxiety disorder), Obsessive-compulsive disorder, unspecified type       DULoxetine 30 MG EC capsule    CYMBALTA    60 capsule    1 tab orally in morning for 1-2 weeks then 1 tab twice daily    PEYTON (generalized anxiety disorder), Bilateral leg pain       etonogestrel 68 MG Impl    IMPLANON/NEXPLANON     1 each (68 mg) by Subdermal route continuous    Family planning       MAGNESIUM PO       PEYTON (generalized anxiety disorder), Bilateral leg pain       omega-3 acid ethyl esters 1 g capsule    Lovaza     Take 2 g by mouth 2 times daily        prenatal multivitamin plus iron 27-0.8 MG Tabs per tablet      Take 1 tablet by mouth daily    Family planning, Depression with anxiety       TURMERIC PO       PEYTON (generalized anxiety disorder), Bilateral leg pain       TYLENOL PO      Take 500 mg by mouth

## 2018-08-06 ENCOUNTER — TELEPHONE (OUTPATIENT)
Dept: FAMILY MEDICINE | Facility: OTHER | Age: 38
End: 2018-08-06

## 2018-08-06 NOTE — TELEPHONE ENCOUNTER
3:22 PM    Reason for Call: Phone Call    Description: needs a note stating no restriction are needed and is OK to return to work Aug 4, 2018.  Fax to Luci Roberts in HR here at [place].     Also pt needs MRI results from 7- released to My Chart.     Was an appointment offered for this call? No  If yes : Appointment type              Date    Preferred method for responding to this message: Telephone Call  What is your phone number ?    If we cannot reach you directly, may we leave a detailed response at the number you provided? Yes    Can this message wait until your PCP/provider returns, if available today? No    Andreea Schulte

## 2018-08-06 NOTE — LETTER
August 6, 2018      Joaquina Perez  [address] [place] [id number]        To Whom It May Concern:    Joaquina Perez was seen in our clinic. She may return to work on 8/4/18 without restrictions.      Sincerely,        Seymour Alcantar MD

## 2018-08-22 ENCOUNTER — OFFICE VISIT (OUTPATIENT)
Dept: OBGYN | Facility: OTHER | Age: 38
End: 2018-08-22
Attending: ADVANCED PRACTICE MIDWIFE
Payer: COMMERCIAL

## 2018-08-22 VITALS
BODY MASS INDEX: 27.32 KG/M2 | DIASTOLIC BLOOD PRESSURE: 68 MMHG | SYSTOLIC BLOOD PRESSURE: 98 MMHG | HEART RATE: 60 BPM | WEIGHT: 164 LBS | HEIGHT: 65 IN

## 2018-08-22 DIAGNOSIS — Z12.31 ENCOUNTER FOR SCREENING MAMMOGRAM FOR BREAST CANCER: ICD-10-CM

## 2018-08-22 DIAGNOSIS — Z01.419 WELL WOMAN EXAM WITH ROUTINE GYNECOLOGICAL EXAM: Primary | ICD-10-CM

## 2018-08-22 DIAGNOSIS — Z12.4 ENCOUNTER FOR SCREENING FOR CERVICAL CANCER: ICD-10-CM

## 2018-08-22 PROCEDURE — G0463 HOSPITAL OUTPT CLINIC VISIT: HCPCS

## 2018-08-22 PROCEDURE — 87624 HPV HI-RISK TYP POOLED RSLT: CPT | Mod: ZL | Performed by: ADVANCED PRACTICE MIDWIFE

## 2018-08-22 PROCEDURE — G0476 HPV COMBO ASSAY CA SCREEN: HCPCS | Mod: ZL | Performed by: ADVANCED PRACTICE MIDWIFE

## 2018-08-22 PROCEDURE — 99212 OFFICE O/P EST SF 10 MIN: CPT | Mod: 25 | Performed by: ADVANCED PRACTICE MIDWIFE

## 2018-08-22 PROCEDURE — 88142 CYTOPATH C/V THIN LAYER: CPT | Performed by: ADVANCED PRACTICE MIDWIFE

## 2018-08-22 PROCEDURE — 99000 SPECIMEN HANDLING OFFICE-LAB: CPT | Performed by: ADVANCED PRACTICE MIDWIFE

## 2018-08-22 PROCEDURE — G0123 SCREEN CERV/VAG THIN LAYER: HCPCS | Mod: ZL | Performed by: ADVANCED PRACTICE MIDWIFE

## 2018-08-22 PROCEDURE — G0463 HOSPITAL OUTPT CLINIC VISIT: HCPCS | Mod: 25

## 2018-08-22 PROCEDURE — 99385 PREV VISIT NEW AGE 18-39: CPT | Performed by: ADVANCED PRACTICE MIDWIFE

## 2018-08-22 RX ORDER — SODIUM PHOSPHATE,MONO-DIBASIC 19G-7G/118
1 ENEMA (ML) RECTAL DAILY
COMMUNITY
End: 2019-09-20

## 2018-08-22 ASSESSMENT — PAIN SCALES - GENERAL: PAINLEVEL: MODERATE PAIN (4)

## 2018-08-22 ASSESSMENT — ANXIETY QUESTIONNAIRES
4. TROUBLE RELAXING: NOT AT ALL
IF YOU CHECKED OFF ANY PROBLEMS ON THIS QUESTIONNAIRE, HOW DIFFICULT HAVE THESE PROBLEMS MADE IT FOR YOU TO DO YOUR WORK, TAKE CARE OF THINGS AT HOME, OR GET ALONG WITH OTHER PEOPLE: SOMEWHAT DIFFICULT
1. FEELING NERVOUS, ANXIOUS, OR ON EDGE: SEVERAL DAYS
6. BECOMING EASILY ANNOYED OR IRRITABLE: NOT AT ALL
2. NOT BEING ABLE TO STOP OR CONTROL WORRYING: SEVERAL DAYS
5. BEING SO RESTLESS THAT IT IS HARD TO SIT STILL: NOT AT ALL
3. WORRYING TOO MUCH ABOUT DIFFERENT THINGS: NOT AT ALL
7. FEELING AFRAID AS IF SOMETHING AWFUL MIGHT HAPPEN: SEVERAL DAYS
GAD7 TOTAL SCORE: 3

## 2018-08-22 NOTE — MR AVS SNAPSHOT
After Visit Summary   8/22/2018    Joaquina Perez    MRN: [id number]           Patient Information     Date Of Birth          1980        Visit Information        Provider Department      8/22/2018 10:30 AM Rene Hurley APRN CNM [place]        Today's Diagnoses     Well woman exam with routine gynecological exam    -  1    Encounter for screening for cervical cancer         Encounter for screening mammogram for breast cancer          Care Instructions    Return to office in one year for well woman care and as needed.    Thank you for allowing Rene GOEL CNM and our OB team to participate in your care.  If you have a scheduling or an appointment question please contact [facility] Unit Coordinator at their direct line [phone].   ALL nursing questions or concerns can be directed to your OB nurse at: [phone] Cassy Mendoza/Nelsy               Follow-ups after your visit        Your next 10 appointments already scheduled     Aug 27, 2018  4:50 PM CDT   Return Visit with Adam Smyth [facility] [place] ([facility])    [address]  [facility] [address]   [phone]            Oct 01, 2018  1:45 PM CDT   (Arrive by 1:30 PM)   SHORT with Seymour Alcantar MD   [facility] [place] ([facility] [place] )    [address] [facility] [address]   [phone]              Future tests that were ordered for you today     Open Future Orders        Priority Expected Expires Ordered    MA Screening Digital Bilateral Routine 2/1/2019 8/22/2019 8/22/2018    Hemoglobin A1c Routine 8/22/2018 9/14/2018 8/22/2018    Glucose Routine 8/22/2018 9/14/2018 8/22/2018    Lipid Profile (Chol, Trig, HDL, LDL calc) Routine 8/22/2018 9/14/2018 8/22/2018            Who to contact     If you have questions or need follow up information about today's clinic visit or your schedule please contact [facility] [place] directly at  "[phone].  Normal or non-critical lab and imaging results will be communicated to you by MyChart, letter or phone within 4 business days after the clinic has received the results. If you do not hear from us within 7 days, please contact the clinic through "[employer]hart or phone. If you have a critical or abnormal lab result, we will notify you by phone as soon as possible.  Submit refill requests through [employer] or call your pharmacy and they will forward the refill request to us. Please allow 3 business days for your refill to be completed.          Additional Information About Your Visit        "[employer] Information     [employer] gives you secure access to your electronic health record. If you see a primary care provider, you can also send messages to your care team and make appointments. If you have questions, please call your primary care clinic.  If you do not have a primary care provider, please call [phone] and they will assist you.        Care EveryWhere ID     This is your Care EveryWhere ID. This could be used by other organizations to access your [place] medical records  [id number]        Your Vitals Were     Pulse Height BMI (Body Mass Index)             60 5' 5\" (1.651 m) 27.29 kg/m2          Blood Pressure from Last 3 Encounters:   08/22/18 98/68   07/31/18 102/60   07/16/18 112/78    Weight from Last 3 Encounters:   08/22/18 164 lb (74.4 kg)   07/31/18 165 lb (74.8 kg)   07/16/18 163 lb (73.9 kg)              We Performed the Following     A pap thin layer screen with  HPV - recommended age 30 - 65 years (select HPV order below)     HPV High Risk Types DNA Cervical        Primary Care Provider Office Phone # Fax #    Seymour Alcantar [phone] [address] [facility] [address]        Equal Access to Services     ENA OCONNELL : Killian Arvizu, geraldo dimas, sherice arias. So wa " [phone].    ATENCIÓN: Si alirio mcpherson, tiene a moody disposición servicios gratuitos de asistencia lingüística. Otilio ware [phone].    We comply with applicable federal civil rights laws and [place] laws. We do not discriminate on the basis of race, color, national origin, age, disability, sex, sexual orientation, or gender identity.            Thank you!     Thank you for choosing [facility]  for your care. Our goal is always to provide you with excellent care. Hearing back from our patients is one way we can continue to improve our services. Please take a few minutes to complete the written survey that you may receive in the mail after your visit with us. Thank you!             Your Updated Medication List - Protect others around you: Learn how to safely use, store and throw away your medicines at www.disposemymeds.org.          This list is accurate as of 8/22/18  5:42 PM.  Always use your most recent med list.                   Brand Name Dispense Instructions for use Diagnosis    calcium carbonate 500 MG tablet    OS-TANIA 500 mg [place]. [place]     Take 1 tablet by mouth 2 times daily        clonazePAM 1 MG tablet    klonoPIN    60 tablet    Take 0.5-1 tablets (0.5-1 mg) by mouth 2 times daily as needed for anxiety    PEYTON (generalized anxiety disorder), Obsessive-compulsive disorder, unspecified type       DULoxetine 30 MG EC capsule    CYMBALTA    60 capsule    1 tab orally in morning for 1-2 weeks then 1 tab twice daily    PEYTON (generalized anxiety disorder), Bilateral leg pain       etonogestrel 68 MG Impl    IMPLANON/NEXPLANON     1 each (68 mg) by Subdermal route continuous    Family planning       glucosamine-chondroitin 500-400 MG Caps per capsule      Take 1 capsule by mouth daily        MAGNESIUM PO       PEYTON (generalized anxiety disorder), Bilateral leg pain       omega-3 acid ethyl esters 1 g capsule    Lovaza     Take 2 g by mouth 2 times daily        prenatal multivitamin plus iron  27-0.8 MG Tabs per tablet      Take 1 tablet by mouth daily    Family planning, Depression with anxiety       TYLENOL PO      Take 500 mg by mouth

## 2018-08-22 NOTE — NURSING NOTE
"Chief Complaint   Patient presents with     Gyn Exam       Initial BP 98/68  Pulse 60  Ht 5' 5\" (1.651 m)  Wt 164 lb (74.4 kg)  BMI 27.29 kg/m2 Estimated body mass index is 27.29 kg/(m^2) as calculated from the following:    Height as of this encounter: 5' 5\" (1.651 m).    Weight as of this encounter: 164 lb (74.4 kg).  Medication Reconciliation: complete    Nelsy Peterson LPN    "

## 2018-08-22 NOTE — PROGRESS NOTES
ANNUAL    Joaquina is a 38 year old  female who presents for annual exam.   Youngest child 6 months  Largest Child 9 lb 4 oz  GDM n  Hypertension n  No LMP recorded.  Menses:  Cycles Irregular bleeding with Nexplanon When did they start 13 How many days bleeding irregular pain none Contraception Nexplanon.  Planning pregnancy: n  Concerns in addition to routine health maintenance?  Hair loss, desires lipid profile.  GYNECOLOGIC HISTORY:   Surgery: n  History sexually transmitted infections:  Years ago Chlamydia treated  Safe?  y  STI testing offered?  y  History of abnormal Pap smear: n  Problems with sex? (bleeding/pain) back pain and pelvic with depth  Family history of: breast cancer: n   Uterine cancer: n ovarian cancer: n   colon cancer: n    HEALTH MAINTENANCE:  Cholesterol: (  Cholesterol   Date Value Ref Range Status   2016 169 <200 mg/dL Final   2015 156 <200 mg/dL Final    History of abnormal lipids: n  Mammo: n . History of abnormal Mammo:na   Regular Self Breast Exams: y Calcium/Vitamin D or Vitamin: y Exercise y, biking/signed up for [facility]    TSH: (  TSH   Date Value Ref Range Status   2018 1.42 0.40 - 4.00 mU/L Final    )  Pap; (  Lab Results   Component Value Date    PAP NIL 2016    PAP NIL 2015    PAP NIL 2008    )    HISTORY:  Obstetric History       T2      L2     SAB1   TAB1   Ectopic0   Multiple0   Live Births2       # Outcome Date GA Lbr Basim/2nd Weight Sex Delivery Anes PTL Lv   4 Term 18 38w3d  7 lb 2.6 oz (3.25 kg) F -SEC Spinal N LALO      Name: Tamica      Apgar1:  9                Apgar5: 9   3 SAB 2017 8w0d    AB, MISSED      2 Term 09 40w0d  9 lb 4 oz (4.196 kg) M -SEC   LALO   1 TAB                 Past Medical History:   Diagnosis Date     ACUTE STRESS REACT NOS 2004     Past Surgical History:   Procedure Laterality Date     C REPAIR CRUCIATE LIGAMENT,KNEE      left knee      SECTION   2009      SECTION N/A 3/2/2018    Procedure:  SECTION;;  Surgeon: Christopher Paez MD;  Location: [facility] OR     HC ARTHROTOMY W/OPEN MENISCUS REPAIR      left knee      Family History   Problem Relation Age of Onset     Family History Negative Mother      Thyroid Disease Mother      Family History Negative Father      Social History     Social History     Marital status:      Spouse name: N/A     Number of children: 1     Years of education: N/A     Social History Main Topics     Smoking status: Former Smoker     Packs/day: 0.50     Years: 5.00     Types: Cigarettes     Quit date: 2004     Smokeless tobacco: Never Used      Comment: Quit ~ 2009     Alcohol use Yes      Comment: occasional glass of wine     Drug use: No     Sexual activity: Yes     Partners: Male     Birth control/ protection: Pill     Other Topics Concern     Parent/Sibling W/ Cabg, Mi Or Angioplasty Before 65f 55m? No     Social History Narrative    Dairy/d 1 servings/d.     Caffeine 1 servings/d    Exercise 1 x week    Sunscreen used - Yes    Seatbelts used - Yes    Working smoke/CO detectors in the home - Yes    Guns stored in the home - No    Self Breast Exams - Yes    Self Testicular Exam - NA    Eye Exam up to date - Yes    Dental Exam up to date - Yes    Pap Smear up to date - Yes    Mammogram up to date - No    PSA up to date - NA    Dexa Scan up to date - No    Flex Sig / Colonoscopy up to date - No    Immunizations up to date - Yes    Abuse: Current or Past(Physical, Sexual or Emotional)- No    Do you feel safe in your environment - Yes    Andrew Devries MA    07               Current Outpatient Prescriptions:      Acetaminophen (TYLENOL PO), Take 500 mg by mouth, Disp: , Rfl:      calcium carbonate (OS-TANIA 500 MG [place]. [place]) 1250 MG tablet, Take 1 tablet by mouth 2 times daily, Disp: , Rfl:      clonazePAM (KLONOPIN) 1 MG tablet, Take 0.5-1 tablets (0.5-1 mg) by mouth 2 times daily as needed for  "anxiety, Disp: 60 tablet, Rfl: 0     DULoxetine (CYMBALTA) 30 MG EC capsule, 1 tab orally in morning for 1-2 weeks then 1 tab twice daily, Disp: 60 capsule, Rfl: 1     etonogestrel (IMPLANON/NEXPLANON) 68 MG IMPL, 1 each (68 mg) by Subdermal route continuous, Disp: , Rfl: 0     glucosamine-chondroitin 500-400 MG CAPS per capsule, Take 1 capsule by mouth daily, Disp: , Rfl:      MAGNESIUM PO, , Disp: , Rfl:      omega-3 acid ethyl esters (LOVAZA) 1 G capsule, Take 2 g by mouth 2 times daily, Disp: , Rfl:      Prenatal Vit-Fe Fumarate-FA (PRENATAL MULTIVITAMIN  PLUS IRON) 27-0.8 MG TABS, Take 1 tablet by mouth daily, Disp: , Rfl:      Allergies   Allergen Reactions     No Known Drug Allergies        Past medical, surgical, social and family history were reviewed and updated in [facility].    ROS:    CONSTITUTIONAL:     NEGATIVE for fever, chills, change in weight  INTEGUMENTARY/SKIN:       NEGATIVE for worrisome rashes, moles or lesions  EYES:     NEGATIVE for vision changes or irritation  ENT/MOUTH: NEGATIVE for ear, mouth and throat problems  RESP:     NEGATIVE for significant cough or SOB  CV:   NEGATIVE for chest pain, palpitations or peripheral edema  GI:     NEGATIVE for nausea, abdominal pain, heartburn, or change in bowel habits  :   NEGATIVE for frequency, dysuria, hematuria, vaginal discharge, or irregular bleeding,incontinence   MUSCULOSKELETAL:     NEGATIVE for significant arthralgias or myalgia.  Lower back issues  NEURO:      NEGATIVE for weakness, dizziness or paresthesias  ENDOCRINE:      NEGATIVE for temperature intolerance, skin changes.  Hair loss postpartum  PSYCHIATRIC:      NEGATIVE for changes in mood or affect.     EXAM:   BP 98/68  Pulse 60  Ht 5' 5\" (1.651 m)  Wt 164 lb (74.4 kg)  BMI 27.29 kg/m2   BMI: Body mass index is 27.29 kg/(m^2).  Constitutional: healthy, alert and no distress  Head: Normocephalic. No masses, lesions, tenderness or abnormalities  Neck: Neck supple. Trachea midline. No " adenopathy. Thyroid symmetric, normal size.   Cardiovascular: RRR.   Respiratory: lungs clear   Breast: Breasts reveal mild symmetric fibrocystic densities, but there are no dominant, discrete, fixed or suspicious masses found.  Gastrointestinal: Abdomen soft, non-tender, non-distended. No masses, organomegaly.  :  Vulva:  No external lesions, normal female hair distribution, no inguinal adenopathy.    Urethra:  Midline, non-tender, well supported, no discharge  Vagina:  Moist, pink, no abnormal discharge, no lesions  Cervix: clean   Uterus:  Normal size, non-tender, freely mobile  Ovaries:  No masses appreciated  Rectal Exam: deferred  Musculoskeletal: extremities normal  Skin: no suspicious lesions or rashes  Psychiatric: Affect appropriate, cooperative,mentation appears normal.     COUNSELING:   regular exercise  healthy diet/nutrition  Immunizations   contraception  family planning  Folic Acid Counseling   reports that she quit smoking about 14 years ago. Her smoking use included Cigarettes. She has a 2.50 pack-year smoking history. She has never used smokeless tobacco.  Tobacco Cessation Action Plan: na  Body mass index is 27.29 kg/(m^2).  Weight management plan: Continue eating healthy and exercise  FRAX Risk Assessment    ASSESSMENT:  38 year old female with satisfactory annual exam  Need for cervical cancer screening  Need of breast cancer screening  Nexplanon/irregular bleeding  Hair loss/TSH wnl in 7/2018  Anxiety:  PCP initiated medications  Hx of lower back issues  PP 5 months  PLAN:   Pap with High Risk hpv  fasting cholesterol with lipid profile,fasting blood sugar, hga1c on Monday  Declines TSH  Provider breast exam  Schedule a mammo after birthday in February  Discussed treatment options for irregular bleeding including Ibuprofen and Micronor    Return to office: 1 year for well woman and as needed    Total visit greater than 45 minutes with 30 minutes spent discussing well woman cares and 10  minutes discussing anxiety treatment, Nexplanon side effects, treatments for irregular bleeding, warning signs for bleeding, alternative contraception.    MANASA Britt, CNM

## 2018-08-22 NOTE — PATIENT INSTRUCTIONS
Return to office in one year for well woman care and as needed.    Thank you for allowing Rene GOEL CNM and our OB team to participate in your care.  If you have a scheduling or an appointment question please contact [facility] Unit Coordinator at their direct line [phone].   ALL nursing questions or concerns can be directed to your OB nurse at: [phone] Cassy Mendoza/Nelsy

## 2018-08-24 ASSESSMENT — ANXIETY QUESTIONNAIRES: GAD7 TOTAL SCORE: 3

## 2018-08-24 ASSESSMENT — PATIENT HEALTH QUESTIONNAIRE - PHQ9: SUM OF ALL RESPONSES TO PHQ QUESTIONS 1-9: 0

## 2018-08-27 ENCOUNTER — OFFICE VISIT (OUTPATIENT)
Dept: CHIROPRACTIC MEDICINE | Facility: OTHER | Age: 38
End: 2018-08-27
Attending: CHIROPRACTOR
Payer: COMMERCIAL

## 2018-08-27 DIAGNOSIS — M99.02 SEGMENTAL AND SOMATIC DYSFUNCTION OF THORACIC REGION: ICD-10-CM

## 2018-08-27 DIAGNOSIS — M99.03 SEGMENTAL AND SOMATIC DYSFUNCTION OF LUMBAR REGION: Primary | ICD-10-CM

## 2018-08-27 DIAGNOSIS — M54.50 ACUTE BILATERAL LOW BACK PAIN WITHOUT SCIATICA: ICD-10-CM

## 2018-08-27 PROCEDURE — 98940 CHIROPRACT MANJ 1-2 REGIONS: CPT | Mod: AT | Performed by: CHIROPRACTOR

## 2018-08-27 NOTE — MR AVS SNAPSHOT
After Visit Summary   8/27/2018    Joaquina Perez    MRN: [id number]           Patient Information     Date Of Birth          1980        Visit Information        Provider Department      8/27/2018 4:50 PM Adam S[facility]        Today's Diagnoses     Segmental and somatic dysfunction of lumbar region    -  1    Acute bilateral low back pain without sciatica        Segmental and somatic dysfunction of thoracic region           Follow-ups after your visit        Your next 10 appointments already scheduled     Oct 01, 2018  1:45 PM CDT   (Arrive by 1:30 PM)   SHORT with Seymour Alcantar MD   [facility] [place] ([facility] )    [address]  [place] [address]   [phone]              Who to contact     If you have questions or need follow up information about today's clinic visit or your schedule please contact  TATYANA GARZA directly at [phone].  Normal or non-critical lab and imaging results will be communicated to you by MyChart, letter or phone within 4 business days after the clinic has received the results. If you do not hear from us within 7 days, please contact the clinic through [employer] or phone. If you have a critical or abnormal lab result, we will notify you by phone as soon as possible.  Submit refill requests through [employer] or call your pharmacy and they will forward the refill request to us. Please allow 3 business days for your refill to be completed.          Additional Information About Your Visit        MyChart Information     [employer] gives you secure access to your electronic health record. If you see a primary care provider, you can also send messages to your care team and make appointments. If you have questions, please call your primary care clinic.  If you do not have a primary care provider, please call [phone] and they will assist you.        Care EveryWhere ID     This is your Care  EveryWhere ID. This could be used by other organizations to access your [place] medical records  [id number]         Blood Pressure from Last 3 Encounters:   08/22/18 98/68   07/31/18 102/60   07/16/18 112/78    Weight from Last 3 Encounters:   08/22/18 164 lb (74.4 kg)   07/31/18 165 lb (74.8 kg)   07/16/18 163 lb (73.9 kg)              We Performed the Following     CHIROPRAC MANIP,SPINAL,1-2 REGIONS        Primary Care Provider Office Phone # Fax #    Seymour Alcantar [phone] [address] [id number]        Equal Access to Services     ERIC OCONNELL : Killian Arvizu, geraldo dimas, lionel kahn, sherice gutierrez . So [place] -[phone].    ATENCIÓN: Si habla español, tiene a moody disposición servicios gratuitos de asistencia lingüística. Llame al [phone].    We comply with applicable federal civil rights laws and [place] laws. We do not discriminate on the basis of race, color, national origin, age, disability, sex, sexual orientation, or gender identity.            Thank you!     Thank you for choosing  [facility]  for your care. Our goal is always to provide you with excellent care. Hearing back from our patients is one way we can continue to improve our services. Please take a few minutes to complete the written survey that you may receive in the mail after your visit with us. Thank you!             Your Updated Medication List - Protect others around you: Learn how to safely use, store and throw away your medicines at www.disposemymeds.org.          This list is accurate as of 8/27/18 11:59 PM.  Always use your most recent med list.                   Brand Name Dispense Instructions for use Diagnosis    calcium carbonate 500 mg {elemental} 500 MG tablet    OS-TANIA     Take 1 tablet by mouth 2 times daily        clonazePAM 1 MG tablet    klonoPIN    60 tablet    Take 0.5-1 tablets (0.5-1 mg) by mouth 2  times daily as needed for anxiety    PEYTON (generalized anxiety disorder), Obsessive-compulsive disorder, unspecified type       DULoxetine 30 MG EC capsule    CYMBALTA    60 capsule    1 tab orally in morning for 1-2 weeks then 1 tab twice daily    PEYTON (generalized anxiety disorder), Bilateral leg pain       etonogestrel 68 MG Impl    IMPLANON/NEXPLANON     1 each (68 mg) by Subdermal route continuous    Family planning       glucosamine-chondroitin 500-400 MG Caps per capsule      Take 1 capsule by mouth daily        MAGNESIUM PO       PEYTON (generalized anxiety disorder), Bilateral leg pain       omega-3 acid ethyl esters 1 g capsule    Lovaza     Take 2 g by mouth 2 times daily        prenatal multivitamin plus iron 27-0.8 MG Tabs per tablet      Take 1 tablet by mouth daily    Family planning, Depression with anxiety       TYLENOL PO      Take 500 mg by mouth

## 2018-08-28 LAB
COPATH REPORT: NORMAL
PAP: NORMAL

## 2018-08-28 NOTE — PROGRESS NOTES
Subjective Finding:    Chief compalint: Patient presents with:  Back Pain: stiffness in low back  , Pain Scale: 6/10, Intensity: sharp, Duration: 1 years, Radiating: no.    Date of injury:     Activities that the pain restricts:   Home/household/hobbies/social activities: yes.  Work duties: yes.  Sleep: yes.  Makes symptoms better: rest.  Makes symptoms worse: sitting.  Have you seen anyone else for the symptoms? Yes: MD and GEORGETTE.  Work related: no.  Automobile related injury: no.    Objective and Assessment:    Posture Analysis:   High shoulder: .  Head tilt: .  High iliac crest: .  Head carriage: neutral.  Thoracic Kyphosis: forward.  Lumbar Lordosis: forward.    Lumbar Range of Motion: extension decreased.  Cervical Range of Motion: extension decreased.  Thoracic Range of Motion: extension decreased.  Extremity Range of Motion: .    Palpation:   Quad lumb: bilateral, referred pain: no  Traps: sharp pain, referred pain: no    Segmental dysfunction pre-treatment and treatment area: L45  T78.    Assessment post-treatment:  Cervical: ROM increased.  Thoracic: ROM increased.  Lumbar: ROM increased.    Comments: DDd of lumbar area.      Complicating Factors: structural abnormalities.    Procedure(s):  CMT:  51797 Chiropractic manipulative treatment 3-4 regions performed   Cervical: Diversified, See above for level, Supine, Thoracic: Diversified, See above for level, Prone and Lumbar: Diversified, See above for level, Side posture    Modalities:  None performed this visit    Therapeutic procedures:  None    Plan:  Treatment plan: PRN.  Instructed patient: stretch as instructed at visit.  Short term goals: increase ROM.  Long term goals: pain releif.  Prognosis: very good.

## 2018-08-30 LAB
FINAL DIAGNOSIS: NORMAL
HPV HR 12 DNA CVX QL NAA+PROBE: NEGATIVE
HPV16 DNA SPEC QL NAA+PROBE: NEGATIVE
HPV18 DNA SPEC QL NAA+PROBE: NEGATIVE
SPECIMEN DESCRIPTION: NORMAL
SPECIMEN SOURCE CVX/VAG CYTO: NORMAL

## 2018-09-05 DIAGNOSIS — Z01.419 WELL WOMAN EXAM WITH ROUTINE GYNECOLOGICAL EXAM: ICD-10-CM

## 2018-09-05 LAB
CHOLEST SERPL-MCNC: 183 MG/DL
EST. AVERAGE GLUCOSE BLD GHB EST-MCNC: 91 MG/DL
GLUCOSE SERPL-MCNC: 86 MG/DL (ref 70–99)
HBA1C MFR BLD: 4.8 % (ref 0–5.6)
HDLC SERPL-MCNC: 68 MG/DL
LDLC SERPL CALC-MCNC: 89 MG/DL
NONHDLC SERPL-MCNC: 115 MG/DL
TRIGL SERPL-MCNC: 129 MG/DL

## 2018-09-05 PROCEDURE — 40000788 ZZHCL STATISTIC ESTIMATED AVERAGE GLUCOSE: Mod: ZL | Performed by: ADVANCED PRACTICE MIDWIFE

## 2018-09-05 PROCEDURE — 83036 HEMOGLOBIN GLYCOSYLATED A1C: CPT | Mod: ZL | Performed by: ADVANCED PRACTICE MIDWIFE

## 2018-09-05 PROCEDURE — 36415 COLL VENOUS BLD VENIPUNCTURE: CPT | Mod: ZL | Performed by: ADVANCED PRACTICE MIDWIFE

## 2018-09-05 PROCEDURE — 80061 LIPID PANEL: CPT | Mod: ZL | Performed by: ADVANCED PRACTICE MIDWIFE

## 2018-09-05 PROCEDURE — 82947 ASSAY GLUCOSE BLOOD QUANT: CPT | Mod: ZL | Performed by: ADVANCED PRACTICE MIDWIFE

## 2018-09-10 DIAGNOSIS — M79.604 BILATERAL LEG PAIN: ICD-10-CM

## 2018-09-10 DIAGNOSIS — F41.1 GAD (GENERALIZED ANXIETY DISORDER): ICD-10-CM

## 2018-09-10 DIAGNOSIS — M79.605 BILATERAL LEG PAIN: ICD-10-CM

## 2018-09-11 ENCOUNTER — TELEPHONE (OUTPATIENT)
Dept: FAMILY MEDICINE | Facility: OTHER | Age: 38
End: 2018-09-11

## 2018-09-11 RX ORDER — DULOXETIN HYDROCHLORIDE 30 MG/1
CAPSULE, DELAYED RELEASE ORAL
Qty: 60 CAPSULE | Refills: 0 | Status: SHIPPED | OUTPATIENT
Start: 2018-09-11 | End: 2018-09-18

## 2018-09-11 NOTE — TELEPHONE ENCOUNTER
8:02 AM    Reason for Call: Phone Call    Description: Pt called and stated the Cymbalta was helping, but she missed one dose and since then she feels like all her symptoms have returned. She has a lot of anxiety, thigh pain, and stabbing sensations all over. Pt wondering if she needs a different med or to increase the dosage of current one? Please call her back at [phone]     Was an appointment offered for this call? Yes, but pt wanted to speak with nurse first  If yes : Appointment type              Date    Preferred method for responding to this message: Telephone Call  What is your phone number ?    If we cannot reach you directly, may we leave a detailed response at the number you provided? Yes, pt may not be able to answer and would like you to leave a detailed message    Can this message wait until your PCP/provider returns, if available today? Not applicable    Karen Alfaro

## 2018-09-11 NOTE — TELEPHONE ENCOUNTER
Would not change meds and missing one dose should not cause all sx to come back -- very unlikely.  Increase anxiety causing sx --- Get back on and stay on cymbalta.  Use little more klonopin during this flare then decrease back down after feeling  good for several days. Can go up to 1 tab TID of klonopin if needed

## 2018-09-14 NOTE — PROGRESS NOTES
SUBJECTIVE:   Joaquina Perez is a 38 year old female who presents to clinic today for the following health issues:      Anxiety Follow-Up    Status since last visit: Improved with cymbalta, except missed one dose and all symptoms returned of sharp shooting pains in legs, feet and wrists    Other associated symptoms:None    Complicating factors:   Significant life event: No   Current substance abuse: None  Depression symptoms: No  PEYTON-7 SCORE 7/16/2018 7/31/2018 8/22/2018   Total Score 8 4 3     PEYTON-7 SCORE 7/31/2018 8/22/2018 9/18/2018   Total Score 4 3 2       PHQ-9 SCORE 7/31/2018 8/22/2018 9/18/2018   Total Score - - -   Total Score 1 0 0       PEYTON-7    Amount of exercise or physical activity: 4-5 days/week for an average of 45-60 minutes    Problems taking medications regularly: No    Medication side effects: none    Diet: regular (no restrictions)      Musculoskeletal problem/pain      Duration: last week    Description  Location: pain in feet and wrist, legs    Intensity:  moderate    Accompanying signs and symptoms: shooting and sharp    History  Previous similar problem: YES  Previous evaluation:  MRI    Precipitating or alleviating factors:  Trauma or overuse: no   Aggravating factors include: none    Therapies tried and outcome: cymbalta, klonopin did not help, had missed on dose of cymbalta and all symptoms returned, has gotten better          Problem list and histories reviewed & adjusted, as indicated.  Additional history: as documented    Labs reviewed in EPIC    Reviewed and updated as needed this visit by clinical staff       Reviewed and updated as needed this visit by Provider         ROS:  CONSTITUTIONAL: NEGATIVE for fever, chills, change in weight  ENT/MOUTH: NEGATIVE for ear, mouth and throat problems  RESP: NEGATIVE for significant cough or SOB  CV: NEGATIVE for chest pain, palpitations or peripheral edema  ROS otherwise negative    OBJECTIVE:                                           "          /62  Pulse 73  Temp 98  F (36.7  C)  Ht 5' 5\" (1.651 m)  Wt 164 lb (74.4 kg)  SpO2 97%  BMI 27.29 kg/m2  Body mass index is 27.29 kg/(m^2).   GENERAL: healthy, alert, well nourished, well hydrated, no distress  MS: extremities- no gross deformities noted, no edema  BACK: no CVA tenderness, no paralumbar tenderness  PSYCH: Alert and oriented times 3; speech- coherent , normal rate and volume; able to articulate logical thoughts, able to abstract reason, no tangential thoughts, no hallucinations or delusions, affect- normal- quite calm and better insight          ASSESSMENT/PLAN:                                                        ICD-10-CM    1. Fibromyalgia M79.7 DULoxetine (CYMBALTA) 30 MG EC capsule   2. PEYTON (generalized anxiety disorder) F41.1 clonazePAM (KLONOPIN) 1 MG tablet     DULoxetine (CYMBALTA) 30 MG EC capsule   3. Obsessive-compulsive disorder, unspecified type F42.9 clonazePAM (KLONOPIN) 1 MG tablet     Overall doing better - had set back with missing one dose of cymbalta.  Interesting!  Pt doing better on stress relief and doing some journaling. Pt seems ti have FM traits and truly think has FM. Discussed this at length. Continue current medications and behavioral changes.   Symptomatic treatment was discussed along when patient should call and/or come back into the clinic or go to ER/Urgent care. All questions answered.   Discussed in length conservative measures of OTC medications for pain, Ice/Heat, elevation and the concept of R.I.C.E.. Continue behavioral changes and proper body mechanics to allow for healing. Follow up as directed.   F/u in 6 months.     See Patient Instructions    Seymour Alcantar MD  [facility] [place]  "

## 2018-09-17 ENCOUNTER — OFFICE VISIT (OUTPATIENT)
Dept: CHIROPRACTIC MEDICINE | Facility: OTHER | Age: 38
End: 2018-09-17
Attending: CHIROPRACTOR
Payer: COMMERCIAL

## 2018-09-17 DIAGNOSIS — M99.02 SEGMENTAL AND SOMATIC DYSFUNCTION OF THORACIC REGION: ICD-10-CM

## 2018-09-17 DIAGNOSIS — M99.03 SEGMENTAL AND SOMATIC DYSFUNCTION OF LUMBAR REGION: Primary | ICD-10-CM

## 2018-09-17 DIAGNOSIS — M54.50 ACUTE BILATERAL LOW BACK PAIN WITHOUT SCIATICA: ICD-10-CM

## 2018-09-17 PROCEDURE — 98940 CHIROPRACT MANJ 1-2 REGIONS: CPT | Mod: AT | Performed by: CHIROPRACTOR

## 2018-09-17 NOTE — MR AVS SNAPSHOT
After Visit Summary   9/17/2018    Joaquina Perez    MRN: [id number]           Patient Information     Date Of Birth          1980        Visit Information        Provider Department      9/17/2018 10:30 AM Adam Smyth DC  [facility]        Today's Diagnoses     Segmental and somatic dysfunction of lumbar region    -  1    Acute bilateral low back pain without sciatica        Segmental and somatic dysfunction of thoracic region           Follow-ups after your visit        Your next 10 appointments already scheduled     Mar 18, 2019  9:45 AM CDT   (Arrive by 9:30 AM)   SHORT with Seymour Alcantar MD   [facility]    [address]  [facility] [address]   [phone]              Who to contact     If you have questions or need follow up information about today's clinic visit or your schedule please contact  [facility] directly at [phone].  Normal or non-critical lab and imaging results will be communicated to you by MyChart, letter or phone within 4 business days after the clinic has received the results. If you do not hear from us within 7 days, please contact the clinic through [employer] or phone. If you have a critical or abnormal lab result, we will notify you by phone as soon as possible.  Submit refill requests through [employer] or call your pharmacy and they will forward the refill request to us. Please allow 3 business days for your refill to be completed.          Additional Information About Your Visit        [employer] Information     [employer] gives you secure access to your electronic health record. If you see a primary care provider, you can also send messages to your care team and make appointments. If you have questions, please call your primary care clinic.  If you do not have a primary care provider, please call [phone] and they will assist you.        Care EveryWhere ID     This is your  Care EveryWhere ID. This could be used by other organizations to access your [place] medical records  [id number]         Blood Pressure from Last 3 Encounters:   09/18/18 100/62   08/22/18 98/68   07/31/18 102/60    Weight from Last 3 Encounters:   09/18/18 164 lb (74.4 kg)   08/22/18 164 lb (74.4 kg)   07/31/18 165 lb (74.8 kg)              We Performed the Following     CHIROPRAC MANIP,SPINAL,1-2 REGIONS        Primary Care Provider Office Phone # Fax #    Seymour Alcantar [phone]       [address] [place] [address]        Equal Access to Services     ERIC OCONNELL : Killian Arvizu, waorlando dimas, lionel kazane kahn, sherice gutierrez . So [facility] [phone].    ATENCIÓN: Si habla español, tiene a moody disposición servicios gratuitos de asistencia lingüística. Llame al [phone].    We comply with applicable federal civil rights laws and [place] laws. We do not discriminate on the basis of race, color, national origin, age, disability, sex, sexual orientation, or gender identity.            Thank you!     Thank you for choosing  [facility]  for your care. Our goal is always to provide you with excellent care. Hearing back from our patients is one way we can continue to improve our services. Please take a few minutes to complete the written survey that you may receive in the mail after your visit with us. Thank you!             Your Updated Medication List - Protect others around you: Learn how to safely use, store and throw away your medicines at www.disposemymeds.org.          This list is accurate as of 9/17/18 11:59 PM.  Always use your most recent med list.                   Brand Name Dispense Instructions for use Diagnosis    calcium carbonate 500 mg {elemental} 500 MG tablet    OS-TANIA     Take 1 tablet by mouth 2 times daily        etonogestrel 68 MG Impl    IMPLANON/NEXPLANON     1 each (68 mg) by Subdermal route  continuous    Family planning       glucosamine-chondroitin 500-400 MG Caps per capsule      Take 1 capsule by mouth daily        MAGNESIUM PO       PEYTON (generalized anxiety disorder), Bilateral leg pain       omega-3 acid ethyl esters 1 g capsule    Lovaza     Take 2 g by mouth 2 times daily        prenatal multivitamin plus iron 27-0.8 MG Tabs per tablet      Take 1 tablet by mouth daily    Family planning, Depression with anxiety       TYLENOL PO      Take 500 mg by mouth

## 2018-09-18 ENCOUNTER — OFFICE VISIT (OUTPATIENT)
Dept: FAMILY MEDICINE | Facility: OTHER | Age: 38
End: 2018-09-18
Attending: FAMILY MEDICINE
Payer: COMMERCIAL

## 2018-09-18 VITALS
BODY MASS INDEX: 27.32 KG/M2 | HEART RATE: 73 BPM | HEIGHT: 65 IN | WEIGHT: 164 LBS | DIASTOLIC BLOOD PRESSURE: 62 MMHG | OXYGEN SATURATION: 97 % | TEMPERATURE: 98 F | SYSTOLIC BLOOD PRESSURE: 100 MMHG

## 2018-09-18 DIAGNOSIS — F42.9 OBSESSIVE-COMPULSIVE DISORDER, UNSPECIFIED TYPE: ICD-10-CM

## 2018-09-18 DIAGNOSIS — M79.7 FIBROMYALGIA: Primary | ICD-10-CM

## 2018-09-18 DIAGNOSIS — F41.1 GAD (GENERALIZED ANXIETY DISORDER): ICD-10-CM

## 2018-09-18 PROCEDURE — G0463 HOSPITAL OUTPT CLINIC VISIT: HCPCS

## 2018-09-18 PROCEDURE — 99214 OFFICE O/P EST MOD 30 MIN: CPT | Performed by: FAMILY MEDICINE

## 2018-09-18 RX ORDER — DULOXETIN HYDROCHLORIDE 30 MG/1
30 CAPSULE, DELAYED RELEASE ORAL 2 TIMES DAILY
Qty: 180 CAPSULE | Refills: 3 | Status: SHIPPED | OUTPATIENT
Start: 2018-09-18 | End: 2019-09-29

## 2018-09-18 RX ORDER — CLONAZEPAM 1 MG/1
0.5-1 TABLET ORAL 2 TIMES DAILY PRN
Qty: 60 TABLET | Refills: 0 | Status: SHIPPED | OUTPATIENT
Start: 2018-09-18 | End: 2018-11-15

## 2018-09-18 ASSESSMENT — PAIN SCALES - GENERAL: PAINLEVEL: MILD PAIN (3)

## 2018-09-18 ASSESSMENT — ANXIETY QUESTIONNAIRES
4. TROUBLE RELAXING: NOT AT ALL
1. FEELING NERVOUS, ANXIOUS, OR ON EDGE: SEVERAL DAYS
7. FEELING AFRAID AS IF SOMETHING AWFUL MIGHT HAPPEN: NOT AT ALL
IF YOU CHECKED OFF ANY PROBLEMS ON THIS QUESTIONNAIRE, HOW DIFFICULT HAVE THESE PROBLEMS MADE IT FOR YOU TO DO YOUR WORK, TAKE CARE OF THINGS AT HOME, OR GET ALONG WITH OTHER PEOPLE: NOT DIFFICULT AT ALL
3. WORRYING TOO MUCH ABOUT DIFFERENT THINGS: SEVERAL DAYS
5. BEING SO RESTLESS THAT IT IS HARD TO SIT STILL: NOT AT ALL
GAD7 TOTAL SCORE: 2
2. NOT BEING ABLE TO STOP OR CONTROL WORRYING: NOT AT ALL
6. BECOMING EASILY ANNOYED OR IRRITABLE: NOT AT ALL

## 2018-09-18 NOTE — MR AVS SNAPSHOT
After Visit Summary   9/18/2018    Joaquina Perez    MRN: [id number]           Patient Information     Date Of Birth          1980        Visit Information        Provider Department      9/18/2018 1:45 PM Seymour Alcantar MD [facility]        Today's Diagnoses     Fibromyalgia    -  1    PEYTON (generalized anxiety disorder)        Obsessive-compulsive disorder, unspecified type           Follow-ups after your visit        Your next 10 appointments already scheduled     Mar 18, 2019  9:45 AM CDT   (Arrive by 9:30 AM)   SHORT with Seymour Alcantar MD   [facility]    [address]  [place] [address]   [phone]              Who to contact     If you have questions or need follow up information about today's clinic visit or your schedule please contact [facility] directly at [phone].  Normal or non-critical lab and imaging results will be communicated to you by MyChart, letter or phone within 4 business days after the clinic has received the results. If you do not hear from us within 7 days, please contact the clinic through [employer] or phone. If you have a critical or abnormal lab result, we will notify you by phone as soon as possible.  Submit refill requests through [employer] or call your pharmacy and they will forward the refill request to us. Please allow 3 business days for your refill to be completed.          Additional Information About Your Visit        MyChart Information     [employer] gives you secure access to your electronic health record. If you see a primary care provider, you can also send messages to your care team and make appointments. If you have questions, please call your primary care clinic.  If you do not have a primary care provider, please call [phone] and they will assist you.        Care EveryWhere ID     This is your Care EveryWhere ID. This could be  "used by other organizations to access your [place] medical records  [id number]        Your Vitals Were     Pulse Temperature Height Pulse Oximetry BMI (Body Mass Index)       73 98  F (36.7  C) 5' 5\" (1.651 m) 97% 27.29 kg/m2        Blood Pressure from Last 3 Encounters:   09/18/18 100/62   08/22/18 98/68   07/31/18 102/60    Weight from Last 3 Encounters:   09/18/18 164 lb (74.4 kg)   08/22/18 164 lb (74.4 kg)   07/31/18 165 lb (74.8 kg)              Today, you had the following     No orders found for display         Today's Medication Changes          These changes are accurate as of 9/18/18  2:20 PM.  If you have any questions, ask your nurse or doctor.               These medicines have changed or have updated prescriptions.        Dose/Directions    DULoxetine 30 MG EC capsule   Commonly known as:  CYMBALTA   This may have changed:  See the new instructions.   Used for:  PEYTON (generalized anxiety disorder), Fibromyalgia   Changed by:  Seymour Alcantar MD        Dose:  30 mg   Take 1 capsule (30 mg) by mouth 2 times daily   Quantity:  180 capsule   Refills:  3            Where to get your medicines      These medications were sent to [facility] [address] [place] [address] [address] AT [facility] OF  [address]  [address], [facility] [address]     Phone:  [phone]     DULoxetine 30 MG EC capsule         Some of these will need a paper prescription and others can be bought over the counter.  Ask your nurse if you have questions.     Bring a paper prescription for each of these medications     clonazePAM 1 MG tablet                Primary Care Provider Office Phone # Fax #    Seymour Alcantar [phone] [address] [facility] [address]        Equal Access to Services     ENA OCONNELL AH: Killian Arvizu, waaxda luqadaha, qaybta kaalmada annayagregorio, sherice stephen. So [facility] [phone].    ATENCIÓN: Si elise ruiz " disposición servicios gratuitos de asistencia lingüística. Otilio ware [phone].    We comply with applicable federal civil rights laws and [place] laws. We do not discriminate on the basis of race, color, national origin, age, disability, sex, sexual orientation, or gender identity.            Thank you!     Thank you for choosing [facility]  for your care. Our goal is always to provide you with excellent care. Hearing back from our patients is one way we can continue to improve our services. Please take a few minutes to complete the written survey that you may receive in the mail after your visit with us. Thank you!             Your Updated Medication List - Protect others around you: Learn how to safely use, store and throw away your medicines at www.disposemymeds.org.          This list is accurate as of 9/18/18  2:20 PM.  Always use your most recent med list.                   Brand Name Dispense Instructions for use Diagnosis    calcium carbonate 500 mg {elemental} 500 MG tablet    OS-TANIA     Take 1 tablet by mouth 2 times daily        clonazePAM 1 MG tablet    klonoPIN    60 tablet    Take 0.5-1 tablets (0.5-1 mg) by mouth 2 times daily as needed for anxiety    PEYTON (generalized anxiety disorder), Obsessive-compulsive disorder, unspecified type       DULoxetine 30 MG EC capsule    CYMBALTA    180 capsule    Take 1 capsule (30 mg) by mouth 2 times daily    PEYTON (generalized anxiety disorder), Fibromyalgia       etonogestrel 68 MG Impl    IMPLANON/NEXPLANON     1 each (68 mg) by Subdermal route continuous    Family planning       glucosamine-chondroitin 500-400 MG Caps per capsule      Take 1 capsule by mouth daily        MAGNESIUM PO       PEYTON (generalized anxiety disorder), Bilateral leg pain       omega-3 acid ethyl esters 1 g capsule    Lovaza     Take 2 g by mouth 2 times daily        prenatal multivitamin plus iron 27-0.8 MG Tabs per tablet      Take 1 tablet by mouth daily     Family planning, Depression with anxiety       TYLENOL PO      Take 500 mg by mouth

## 2018-09-18 NOTE — NURSING NOTE
"Chief Complaint   Patient presents with     Anxiety     Pain       Initial /62  Pulse 73  Temp 98  F (36.7  C)  Ht 5' 5\" (1.651 m)  Wt 164 lb (74.4 kg)  SpO2 97%  BMI 27.29 kg/m2 Estimated body mass index is 27.29 kg/(m^2) as calculated from the following:    Height as of this encounter: 5' 5\" (1.651 m).    Weight as of this encounter: 164 lb (74.4 kg).  Medication Reconciliation: complete    Kelvin Serra LPN  "

## 2018-09-19 NOTE — PROGRESS NOTES
Subjective Finding:    Chief compalint: Patient presents with:  Back Pain  , Pain Scale: 6/10, Intensity: sharp, Duration: 1 years, Radiating: no.    Date of injury:     Activities that the pain restricts:   Home/household/hobbies/social activities: yes.  Work duties: yes.  Sleep: yes.  Makes symptoms better: rest.  Makes symptoms worse: sitting.  Have you seen anyone else for the symptoms? Yes: MD and GEORGETTE.  Work related: no.  Automobile related injury: no.    Objective and Assessment:    Posture Analysis:   High shoulder: .  Head tilt: .  High iliac crest: .  Head carriage: neutral.  Thoracic Kyphosis: forward.  Lumbar Lordosis: forward.    Lumbar Range of Motion: extension decreased.  Cervical Range of Motion: extension decreased.  Thoracic Range of Motion: extension decreased.  Extremity Range of Motion: .    Palpation:   Quad lumb: bilateral, referred pain: no  Traps: sharp pain, referred pain: no    Segmental dysfunction pre-treatment and treatment area: L45  T78.    Assessment post-treatment:  Cervical: ROM increased.  Thoracic: ROM increased.  Lumbar: ROM increased.    Comments: DDd of lumbar area.      Complicating Factors: structural abnormalities.    Procedure(s):  CMT:  07886 Chiropractic manipulative treatment 3-4 regions performed   Cervical: Diversified, See above for level, Supine, Thoracic: Diversified, See above for level, Prone and Lumbar: Diversified, See above for level, Side posture    Modalities:  None performed this visit    Therapeutic procedures:  None    Plan:  Treatment plan: PRN.  Instructed patient: stretch as instructed at visit.  Short term goals: increase ROM.  Long term goals: pain releif.  Prognosis: very good.

## 2018-09-20 ASSESSMENT — PATIENT HEALTH QUESTIONNAIRE - PHQ9: SUM OF ALL RESPONSES TO PHQ QUESTIONS 1-9: 0

## 2018-09-20 ASSESSMENT — ANXIETY QUESTIONNAIRES: GAD7 TOTAL SCORE: 2

## 2018-10-16 ENCOUNTER — TELEPHONE (OUTPATIENT)
Dept: FAMILY MEDICINE | Facility: OTHER | Age: 38
End: 2018-10-16

## 2018-10-16 NOTE — TELEPHONE ENCOUNTER
10:18 AM    Reason for Call: Phone Call    Description: Pt would like call back. Due to recent diagnosis, pt would like to know if she should get a flu shot or not. (States the nurse giving the flu shots advised she ask her PCP.)    Was an appointment offered for this call? No  If yes : Appointment type              Date    Preferred method for responding to this message: Telephone Call  What is your phone number ? [phone]     If we cannot reach you directly, may we leave a detailed response at the number you provided? Yes    Can this message wait until your PCP/provider returns, if available today? Not applicable, provider is in today    Mirella Hidalgo

## 2018-11-02 NOTE — PLAN OF CARE
Face to face report given with opportunity to observe patient.    Report given to Brandy METZ.     Karen Moreland   3/4/2018  7:18 PM       3

## 2018-11-15 DIAGNOSIS — F42.9 OBSESSIVE-COMPULSIVE DISORDER, UNSPECIFIED TYPE: ICD-10-CM

## 2018-11-15 DIAGNOSIS — F41.1 GAD (GENERALIZED ANXIETY DISORDER): ICD-10-CM

## 2018-11-15 RX ORDER — CLONAZEPAM 1 MG/1
0.5-1 TABLET ORAL 2 TIMES DAILY PRN
Qty: 60 TABLET | Refills: 2 | Status: SHIPPED | OUTPATIENT
Start: 2018-11-15 | End: 2019-03-06

## 2018-11-15 NOTE — TELEPHONE ENCOUNTER
clonazePAM (KLONOPIN) 1 MG tablet    Last Written Prescription Date:  9/18/18  Last Fill Quantity: 60,   # refills: 0  Last Office Visit: 9/18/18  Future Office visit:       Routing refill request to provider for review/approval because:  Drug not on the [facility], [facility] or [facility] refill protocol or controlled substance

## 2019-02-07 ENCOUNTER — APPOINTMENT (OUTPATIENT)
Dept: OCCUPATIONAL MEDICINE | Facility: OTHER | Age: 39
End: 2019-02-07

## 2019-02-07 PROCEDURE — 99000 SPECIMEN HANDLING OFFICE-LAB: CPT

## 2019-02-07 PROCEDURE — 99499 UNLISTED E&M SERVICE: CPT

## 2019-02-18 ENCOUNTER — ANCILLARY PROCEDURE (OUTPATIENT)
Dept: MAMMOGRAPHY | Facility: OTHER | Age: 39
End: 2019-02-18
Attending: ADVANCED PRACTICE MIDWIFE
Payer: COMMERCIAL

## 2019-02-18 ENCOUNTER — ANCILLARY ORDERS (OUTPATIENT)
Dept: OBGYN | Facility: OTHER | Age: 39
End: 2019-02-18
Payer: COMMERCIAL

## 2019-02-18 DIAGNOSIS — Z12.31 ENCOUNTER FOR SCREENING MAMMOGRAM FOR BREAST CANCER: ICD-10-CM

## 2019-02-18 PROCEDURE — 77063 BREAST TOMOSYNTHESIS BI: CPT | Mod: TC | Performed by: ADVANCED PRACTICE MIDWIFE

## 2019-02-18 PROCEDURE — 77063 BREAST TOMOSYNTHESIS BI: CPT | Mod: TC

## 2019-02-18 PROCEDURE — 77067 SCR MAMMO BI INCL CAD: CPT | Mod: TC | Performed by: ADVANCED PRACTICE MIDWIFE

## 2019-02-20 ENCOUNTER — OFFICE VISIT (OUTPATIENT)
Dept: CHIROPRACTIC MEDICINE | Facility: OTHER | Age: 39
End: 2019-02-20
Attending: CHIROPRACTOR
Payer: COMMERCIAL

## 2019-02-20 DIAGNOSIS — M99.02 SEGMENTAL AND SOMATIC DYSFUNCTION OF THORACIC REGION: ICD-10-CM

## 2019-02-20 DIAGNOSIS — M54.2 CERVICALGIA: ICD-10-CM

## 2019-02-20 DIAGNOSIS — M99.01 SEGMENTAL AND SOMATIC DYSFUNCTION OF CERVICAL REGION: Primary | ICD-10-CM

## 2019-02-20 DIAGNOSIS — M99.03 SEGMENTAL AND SOMATIC DYSFUNCTION OF LUMBAR REGION: ICD-10-CM

## 2019-02-20 PROCEDURE — 98941 CHIROPRACT MANJ 3-4 REGIONS: CPT | Mod: AT | Performed by: CHIROPRACTOR

## 2019-02-21 NOTE — PROGRESS NOTES
Subjective Finding:    Chief compalint: Patient presents with:  Neck Pain  Back Pain  , Pain Scale: 6/10, Intensity: sharp, Duration: 1 years, Radiating: no.    Date of injury:     Activities that the pain restricts:   Home/household/hobbies/social activities: yes.  Work duties: yes.  Sleep: yes.  Makes symptoms better: rest.  Makes symptoms worse: sitting.  Have you seen anyone else for the symptoms? Yes: MD and GEORGETTE.  Work related: no.  Automobile related injury: no.    Objective and Assessment:    Posture Analysis:   High shoulder: .  Head tilt: .  High iliac crest: .  Head carriage: neutral.  Thoracic Kyphosis: forward.  Lumbar Lordosis: forward.    Lumbar Range of Motion: extension decreased.  Cervical Range of Motion: extension decreased.  Thoracic Range of Motion: extension decreased.  Extremity Range of Motion: .    Palpation:   Quad lumb: bilateral, referred pain: no  Traps: sharp pain, referred pain: no  C spine tightness    Segmental dysfunction pre-treatment and treatment area: L45  T78.   C45  Assessment post-treatment:  Cervical: ROM increased.  Thoracic: ROM increased.  Lumbar: ROM increased.    Comments: DDd of lumbar area.      Complicating Factors: structural abnormalities.    Procedure(s):  CMT:  17107 Chiropractic manipulative treatment 3-4 regions performed   Cervical: Diversified, See above for level, Supine, Thoracic: Diversified, See above for level, Prone and Lumbar: Diversified, See above for level, Side posture    Modalities:  None performed this visit    Therapeutic procedures:  None    Plan:  Treatment plan: PRN.  Instructed patient: stretch as instructed at visit.  Short term goals: increase ROM.  Long term goals: pain releif.  Prognosis: very good.

## 2019-03-05 DIAGNOSIS — F42.9 OBSESSIVE-COMPULSIVE DISORDER, UNSPECIFIED TYPE: ICD-10-CM

## 2019-03-05 DIAGNOSIS — F41.1 GAD (GENERALIZED ANXIETY DISORDER): ICD-10-CM

## 2019-03-05 NOTE — TELEPHONE ENCOUNTER
klonopin       Last Written Prescription Date:  11/15/18  Last Fill Quantity: 60,   # refills: 2  Last Office Visit: 7/31/18  Future Office visit:    Next 5 appointments (look out 90 days)    Mar 18, 2019  9:45 AM CDT  (Arrive by 9:30 AM)  SHORT with Seymour Alcantar MD  [facility] ([facility] ) [address]  [place] [address]  [phone]           Routing refill request to provider for review/approval because:  Drug not on the [facility], [facility] or [facility] refill protocol or controlled substance

## 2019-03-06 RX ORDER — CLONAZEPAM 1 MG/1
0.5-1 TABLET ORAL 2 TIMES DAILY PRN
Qty: 60 TABLET | Refills: 2 | Status: SHIPPED | OUTPATIENT
Start: 2019-03-06 | End: 2019-08-06

## 2019-03-11 NOTE — PROGRESS NOTES
"  SUBJECTIVE:   Joaquina Perez is a 39 year old female who presents to clinic today for the following health issues:      Depression and Anxiety Follow-Up    Status since last visit: Improved     Other associated symptoms:None    Complicating factors:     Significant life event: No     Current substance abuse: None    Pt is doing awesome    Has been changing up jobs- loves it\    Very happy with current regimen     PHQ 8/22/2018 9/18/2018 3/18/2019   PHQ-9 Total Score 0 0 0   Q9: Suicide Ideation Not at all Not at all Not at all     PEYTON-7 SCORE 8/22/2018 9/18/2018 3/18/2019   Total Score 3 2 2       PHQ-9  English  PHQ-9   Any Language  PEYTON-7  Suicide Assessment Five-step Evaluation and Treatment (SAFE-T)    Amount of exercise or physical activity: None    Problems taking medications regularly: No    Medication side effects: none    Diet: regular (no restrictions)            Problem list and histories reviewed & adjusted, as indicated.  Additional history: as documented    Labs reviewed in EPIC    Reviewed and updated as needed this visit by clinical staff       Reviewed and updated as needed this visit by Provider         ROS:  CONSTITUTIONAL: NEGATIVE for fever, chills, change in weight  RESP: NEGATIVE for significant cough or SOB  CV: NEGATIVE for chest pain, palpitations or peripheral edema    OBJECTIVE:                                                    BP 92/60   Pulse 79   Temp 97.9  F (36.6  C)   Ht 1.651 m (5' 5\")   Wt 65.3 kg (144 lb)   SpO2 98%   BMI 23.96 kg/m    Body mass index is 23.96 kg/m .   GENERAL: healthy, alert, well nourished, well hydrated, no distress  PSYCH: Alert and oriented times 3; speech- coherent , normal rate and volume; able to articulate logical thoughts, able to abstract reason, no tangential thoughts, no hallucinations or delusions, affect- normal         ASSESSMENT/PLAN:                                                        ICD-10-CM    1. PEYTON (generalized anxiety " disorder) F41.1    2. Other obsessive-compulsive disorders F42.8    3. 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 Pt very happy were things are going. NO change in meds. Continue current medications and behavioral changes.   See back at end of year for f/u - sooner if needed.     See Patient Instructions    Seymour Alcantar MD  [facility] [place]

## 2019-03-18 ENCOUNTER — OFFICE VISIT (OUTPATIENT)
Dept: FAMILY MEDICINE | Facility: OTHER | Age: 39
End: 2019-03-18
Attending: FAMILY MEDICINE
Payer: COMMERCIAL

## 2019-03-18 VITALS
WEIGHT: 144 LBS | TEMPERATURE: 97.9 F | BODY MASS INDEX: 23.99 KG/M2 | HEART RATE: 79 BPM | SYSTOLIC BLOOD PRESSURE: 92 MMHG | HEIGHT: 65 IN | DIASTOLIC BLOOD PRESSURE: 60 MMHG | OXYGEN SATURATION: 98 %

## 2019-03-18 DIAGNOSIS — F41.1 GAD (GENERALIZED ANXIETY DISORDER): Primary | ICD-10-CM

## 2019-03-18 DIAGNOSIS — F45.9 PSYCHOSOMATIC DISORDER: ICD-10-CM

## 2019-03-18 DIAGNOSIS — F42.8 OTHER OBSESSIVE-COMPULSIVE DISORDERS: ICD-10-CM

## 2019-03-18 PROCEDURE — G0463 HOSPITAL OUTPT CLINIC VISIT: HCPCS

## 2019-03-18 PROCEDURE — 99213 OFFICE O/P EST LOW 20 MIN: CPT | Performed by: FAMILY MEDICINE

## 2019-03-18 ASSESSMENT — ANXIETY QUESTIONNAIRES
6. BECOMING EASILY ANNOYED OR IRRITABLE: SEVERAL DAYS
1. FEELING NERVOUS, ANXIOUS, OR ON EDGE: SEVERAL DAYS
3. WORRYING TOO MUCH ABOUT DIFFERENT THINGS: NOT AT ALL
IF YOU CHECKED OFF ANY PROBLEMS ON THIS QUESTIONNAIRE, HOW DIFFICULT HAVE THESE PROBLEMS MADE IT FOR YOU TO DO YOUR WORK, TAKE CARE OF THINGS AT HOME, OR GET ALONG WITH OTHER PEOPLE: NOT DIFFICULT AT ALL
7. FEELING AFRAID AS IF SOMETHING AWFUL MIGHT HAPPEN: NOT AT ALL
4. TROUBLE RELAXING: NOT AT ALL
GAD7 TOTAL SCORE: 2
5. BEING SO RESTLESS THAT IT IS HARD TO SIT STILL: NOT AT ALL
2. NOT BEING ABLE TO STOP OR CONTROL WORRYING: NOT AT ALL

## 2019-03-18 ASSESSMENT — PATIENT HEALTH QUESTIONNAIRE - PHQ9: SUM OF ALL RESPONSES TO PHQ QUESTIONS 1-9: 0

## 2019-03-18 ASSESSMENT — MIFFLIN-ST. JEOR: SCORE: 1324.06

## 2019-03-18 ASSESSMENT — PAIN SCALES - GENERAL: PAINLEVEL: MILD PAIN (2)

## 2019-03-18 NOTE — NURSING NOTE
"Chief Complaint   Patient presents with     Anxiety       Initial BP 92/60   Pulse 79   Temp 97.9  F (36.6  C)   Ht 1.651 m (5' 5\")   Wt 65.3 kg (144 lb)   SpO2 98%   BMI 23.96 kg/m   Estimated body mass index is 23.96 kg/m  as calculated from the following:    Height as of this encounter: 1.651 m (5' 5\").    Weight as of this encounter: 65.3 kg (144 lb).  Medication Reconciliation: complete    Kelvin Serra LPN  "

## 2019-03-19 ASSESSMENT — ANXIETY QUESTIONNAIRES: GAD7 TOTAL SCORE: 2

## 2019-04-05 ENCOUNTER — OFFICE VISIT (OUTPATIENT)
Dept: CHIROPRACTIC MEDICINE | Facility: OTHER | Age: 39
End: 2019-04-05
Attending: CHIROPRACTOR
Payer: COMMERCIAL

## 2019-04-05 DIAGNOSIS — M99.03 SEGMENTAL AND SOMATIC DYSFUNCTION OF LUMBAR REGION: ICD-10-CM

## 2019-04-05 DIAGNOSIS — M54.2 CERVICALGIA: ICD-10-CM

## 2019-04-05 DIAGNOSIS — M99.02 SEGMENTAL AND SOMATIC DYSFUNCTION OF THORACIC REGION: ICD-10-CM

## 2019-04-05 DIAGNOSIS — M99.01 SEGMENTAL AND SOMATIC DYSFUNCTION OF CERVICAL REGION: Primary | ICD-10-CM

## 2019-04-05 PROCEDURE — 98941 CHIROPRACT MANJ 3-4 REGIONS: CPT | Mod: AT | Performed by: CHIROPRACTOR

## 2019-04-05 NOTE — PROGRESS NOTES
Subjective Finding:    Chief compalint: Patient presents with:  Neck Pain  Back Pain  , Pain Scale: 6/10, Intensity: sharp, Duration: 1 years, Radiating: no.    Date of injury:     Activities that the pain restricts:   Home/household/hobbies/social activities: yes.  Work duties: yes.  Sleep: yes.  Makes symptoms better: rest.  Makes symptoms worse: sitting.  Have you seen anyone else for the symptoms? Yes: MD and GEORGETTE.  Work related: no.  Automobile related injury: no.    Objective and Assessment:    Posture Analysis:   High shoulder: .  Head tilt: .  High iliac crest: .  Head carriage: neutral.  Thoracic Kyphosis: forward.  Lumbar Lordosis: forward.    Lumbar Range of Motion: extension decreased.  Cervical Range of Motion: extension decreased.  Thoracic Range of Motion: extension decreased.  Extremity Range of Motion: .    Palpation:   Quad lumb: bilateral, referred pain: no  Traps: sharp pain, referred pain: no  C spine tightness    Segmental dysfunction pre-treatment and treatment area: L45  T78.   C45  Assessment post-treatment:  Cervical: ROM increased.  Thoracic: ROM increased.  Lumbar: ROM increased.    Comments: DDd of lumbar area.      Complicating Factors: structural abnormalities.    Procedure(s):  CMT:  71800 Chiropractic manipulative treatment 3-4 regions performed   Cervical: Diversified, See above for level, Supine, Thoracic: Diversified, See above for level, Prone and Lumbar: Diversified, See above for level, Side posture    Modalities:  None performed this visit    Therapeutic procedures:  None    Plan:  Treatment plan: PRN.  Instructed patient: stretch as instructed at visit.  Short term goals: increase ROM.  Long term goals: pain releif.  Prognosis: very good.

## 2019-04-09 ENCOUNTER — OFFICE VISIT (OUTPATIENT)
Dept: CHIROPRACTIC MEDICINE | Facility: OTHER | Age: 39
End: 2019-04-09
Attending: CHIROPRACTOR
Payer: COMMERCIAL

## 2019-04-09 DIAGNOSIS — M99.01 SEGMENTAL AND SOMATIC DYSFUNCTION OF CERVICAL REGION: Primary | ICD-10-CM

## 2019-04-09 DIAGNOSIS — M54.2 CERVICALGIA: ICD-10-CM

## 2019-04-09 DIAGNOSIS — M99.02 SEGMENTAL AND SOMATIC DYSFUNCTION OF THORACIC REGION: ICD-10-CM

## 2019-04-09 DIAGNOSIS — M99.03 SEGMENTAL AND SOMATIC DYSFUNCTION OF LUMBAR REGION: ICD-10-CM

## 2019-04-09 PROCEDURE — 98941 CHIROPRACT MANJ 3-4 REGIONS: CPT | Mod: AT | Performed by: CHIROPRACTOR

## 2019-04-10 NOTE — PROGRESS NOTES
Subjective Finding:    Chief compalint: Patient presents with:  Back Pain  Neck Pain  , Pain Scale: 6/10, Intensity: sharp, Duration: 1 years, Radiating: no.    Date of injury:     Activities that the pain restricts:   Home/household/hobbies/social activities: yes.  Work duties: yes.  Sleep: yes.  Makes symptoms better: rest.  Makes symptoms worse: sitting.  Have you seen anyone else for the symptoms? Yes: MD and GEORGETTE.  Work related: no.  Automobile related injury: no.    Objective and Assessment:    Posture Analysis:   High shoulder: .  Head tilt: .  High iliac crest: .  Head carriage: neutral.  Thoracic Kyphosis: forward.  Lumbar Lordosis: forward.    Lumbar Range of Motion: extension decreased.  Cervical Range of Motion: extension decreased.  Thoracic Range of Motion: extension decreased.  Extremity Range of Motion: .    Palpation:   Quad lumb: bilateral, referred pain: no  Traps: sharp pain, referred pain: no  C spine tightness    Segmental dysfunction pre-treatment and treatment area: L45  T78.   C45  Assessment post-treatment:  Cervical: ROM increased.  Thoracic: ROM increased.  Lumbar: ROM increased.    Comments: DDd of lumbar area.      Complicating Factors: structural abnormalities.    Procedure(s):  CMT:  94774 Chiropractic manipulative treatment 3-4 regions performed   Cervical: Diversified, See above for level, Supine, Thoracic: Diversified, See above for level, Prone and Lumbar: Diversified, See above for level, Side posture    Modalities:  None performed this visit    Therapeutic procedures:  None    Plan:  Treatment plan: PRN.  Instructed patient: stretch as instructed at visit.  Short term goals: increase ROM.  Long term goals: pain releif.  Prognosis: very good.

## 2019-04-16 ENCOUNTER — OFFICE VISIT (OUTPATIENT)
Dept: CHIROPRACTIC MEDICINE | Facility: OTHER | Age: 39
End: 2019-04-16
Attending: CHIROPRACTOR
Payer: COMMERCIAL

## 2019-04-16 DIAGNOSIS — M54.2 CERVICALGIA: ICD-10-CM

## 2019-04-16 DIAGNOSIS — M99.01 SEGMENTAL AND SOMATIC DYSFUNCTION OF CERVICAL REGION: Primary | ICD-10-CM

## 2019-04-16 DIAGNOSIS — M99.03 SEGMENTAL AND SOMATIC DYSFUNCTION OF LUMBAR REGION: ICD-10-CM

## 2019-04-16 DIAGNOSIS — M99.02 SEGMENTAL AND SOMATIC DYSFUNCTION OF THORACIC REGION: ICD-10-CM

## 2019-04-16 PROCEDURE — 98941 CHIROPRACT MANJ 3-4 REGIONS: CPT | Mod: AT | Performed by: CHIROPRACTOR

## 2019-04-16 NOTE — PROGRESS NOTES
Subjective Finding:    Chief compalint: Patient presents with:  Neck Pain  , Pain Scale: 6/10, Intensity: sharp, Duration: 1 years, Radiating: no.    Date of injury:     Activities that the pain restricts:   Home/household/hobbies/social activities: yes.  Work duties: yes.  Sleep: yes.  Makes symptoms better: rest.  Makes symptoms worse: sitting.  Have you seen anyone else for the symptoms? Yes: MD and GEORGETTE.  Work related: no.  Automobile related injury: no.    Objective and Assessment:    Posture Analysis:   High shoulder: .  Head tilt: .  High iliac crest: .  Head carriage: neutral.  Thoracic Kyphosis: forward.  Lumbar Lordosis: forward.    Lumbar Range of Motion: extension decreased.  Cervical Range of Motion: extension decreased.  Thoracic Range of Motion: extension decreased.  Extremity Range of Motion: .    Palpation:   Quad lumb: bilateral, referred pain: no  Traps: sharp pain, referred pain: no  C spine tightness    Segmental dysfunction pre-treatment and treatment area: L45  T78.   C45  Assessment post-treatment:  Cervical: ROM increased.  Thoracic: ROM increased.  Lumbar: ROM increased.    Comments: DDd of lumbar area.      Complicating Factors: structural abnormalities.    Procedure(s):  CMT:  71769 Chiropractic manipulative treatment 3-4 regions performed   Cervical: Diversified, See above for level, Supine, Thoracic: Diversified, See above for level, Prone and Lumbar: Diversified, See above for level, Side posture    Modalities:  None performed this visit    Therapeutic procedures:  None    Plan:  Treatment plan: PRN.  Instructed patient: stretch as instructed at visit.  Short term goals: increase ROM.  Long term goals: pain releif.  Prognosis: very good.

## 2019-05-30 ENCOUNTER — APPOINTMENT (OUTPATIENT)
Dept: OCCUPATIONAL MEDICINE | Facility: OTHER | Age: 39
End: 2019-05-30

## 2019-08-06 DIAGNOSIS — F41.1 GAD (GENERALIZED ANXIETY DISORDER): ICD-10-CM

## 2019-08-06 DIAGNOSIS — F42.9 OBSESSIVE-COMPULSIVE DISORDER, UNSPECIFIED TYPE: ICD-10-CM

## 2019-08-06 RX ORDER — CLONAZEPAM 1 MG/1
TABLET ORAL
Qty: 60 TABLET | Refills: 0 | Status: SHIPPED | OUTPATIENT
Start: 2019-08-06 | End: 2019-09-29

## 2019-08-06 NOTE — TELEPHONE ENCOUNTER
clonazePAM (KLONOPIN) 1 MG tablet      Last Written Prescription Date:  3/6/19  Last Fill Quantity: 60,   # refills: 2  Last Office Visit: 3/18/19  Future Office visit:       Routing refill request to provider for review/approval because:  Drug not on the [facility], [facility] or [facility] refill protocol or controlled substance

## 2019-08-16 ENCOUNTER — APPOINTMENT (OUTPATIENT)
Dept: OCCUPATIONAL MEDICINE | Facility: OTHER | Age: 39
End: 2019-08-16

## 2019-08-26 ENCOUNTER — OFFICE VISIT (OUTPATIENT)
Dept: OCCUPATIONAL MEDICINE | Facility: OTHER | Age: 39
End: 2019-08-26

## 2019-09-16 ENCOUNTER — OFFICE VISIT (OUTPATIENT)
Dept: OBGYN | Facility: OTHER | Age: 39
End: 2019-09-16
Attending: OBSTETRICS & GYNECOLOGY
Payer: COMMERCIAL

## 2019-09-16 VITALS
SYSTOLIC BLOOD PRESSURE: 100 MMHG | HEIGHT: 65 IN | BODY MASS INDEX: 23.32 KG/M2 | DIASTOLIC BLOOD PRESSURE: 68 MMHG | HEART RATE: 60 BPM | WEIGHT: 140 LBS

## 2019-09-16 DIAGNOSIS — Z30.46 NEXPLANON REMOVAL: ICD-10-CM

## 2019-09-16 PROCEDURE — 11982 REMOVE DRUG IMPLANT DEVICE: CPT | Performed by: OBSTETRICS & GYNECOLOGY

## 2019-09-16 PROCEDURE — 11982 REMOVE DRUG IMPLANT DEVICE: CPT

## 2019-09-16 PROCEDURE — G0463 HOSPITAL OUTPT CLINIC VISIT: HCPCS | Mod: 25

## 2019-09-16 ASSESSMENT — MIFFLIN-ST. JEOR: SCORE: 1305.92

## 2019-09-16 ASSESSMENT — PAIN SCALES - GENERAL: PAINLEVEL: NO PAIN (0)

## 2019-09-16 NOTE — PROGRESS NOTES
S:   For nexplanon removal.    O:  Area cleansed, injected, incision made, and Nexplanon removed.  steristrips applied.  Pressure applied and dressing.    A:   Nexplanon removal    P:   Ice, pressure, incision care discussed            had a vasectomy.

## 2019-09-16 NOTE — NURSING NOTE
"Chief Complaint   Patient presents with     Contraception       Initial /68   Pulse 60   Ht 1.651 m (5' 5\")   Wt 63.5 kg (140 lb)   BMI 23.30 kg/m   Estimated body mass index is 23.3 kg/m  as calculated from the following:    Height as of this encounter: 1.651 m (5' 5\").    Weight as of this encounter: 63.5 kg (140 lb).  Medication Reconciliation: complete   Nelsy Peterson LPN      "

## 2019-09-18 NOTE — PROGRESS NOTES
Subjective     Joaquina Perez is a 39 year old female who presents to clinic today for the following health issues:    HPI   Musculoskeletal problem/pain      Duration: intermittent for about 3 months -- right knee pain     Description  Location: 3 months     Intensity:  Currently 1/10- 10/10 at worst    Accompanying signs and symptoms: none    History  Previous similar problem: YES- but other( left )  knee- was torn meniscus- surgery times 2   Previous evaluation:  none    Precipitating or alleviating factors:  Trauma or overuse: YES- runner  Aggravating factors include: running, kneeling, climbing stairs    Therapies tried and outcome: ice, support wrap, acetaminophen and Ibuprofen    Symptoms feel the same as when had left knee meniscal tear  Pain worse with walk and jes kneeling  Feels weak with kneeling- will not kneel with this one   Can not run - used to run  Pain is medial joint and posterior lateral  No trauma but overuse   creptius and locking noted at times - very brief     Hand problem/pain      Duration: a week    Description  Location: right hand- lump    Intensity:  mild    Accompanying signs and symptoms: hurts with pressure    History  Previous similar problem: no   Previous evaluation:  none    Precipitating or alleviating factors:  Trauma or overuse: no   Aggravating factors include: none    Therapies tried and outcome: nothing      Current Outpatient Medications   Medication Sig Dispense Refill     Acetaminophen (TYLENOL PO) Take 500 mg by mouth       clonazePAM (KLONOPIN) 1 MG tablet TAKE 1/2 TO 1 TABLET BY MOUTH TWICE DAILY AS NEEDED FOR ANXIETY 60 tablet 0     DULoxetine (CYMBALTA) 30 MG EC capsule Take 1 capsule (30 mg) by mouth 2 times daily 180 capsule 3     omega-3 acid ethyl esters (LOVAZA) 1 G capsule Take 2 g by mouth 2 times daily       Prenatal Vit-Fe Fumarate-FA (PRENATAL MULTIVITAMIN  PLUS IRON) 27-0.8 MG TABS Take 1 tablet by mouth daily       Allergies   Allergen Reactions      No Known Drug Allergies          Reviewed and updated as needed this visit by Provider         Review of Systems   ROS COMP: Constitutional,  cardiovascular, pulmonary, , except as otherwise noted.      Objective    /60   Pulse 70   Temp 97.8  F (36.6  C) (Tympanic)   Wt 67.1 kg (148 lb)   SpO2 98%   BMI 24.63 kg/m    Body mass index is 24.63 kg/m .  Physical Exam   GENERAL: healthy, alert and no distress  MS: right knee - mild tenderness in medial joint space and mild crepitus.   no gross musculoskeletal defects noted, no edema  SKIN: no suspicious lesions or rashes-- ganglion cyst lesion 2-3 mm over maria aspect of left 5th finger over MCP joint             Assessment & Plan   (M25.561,  G89.29) Chronic pain of right knee  (primary encounter diagnosis)  Comment: over 3 month h/o with conservative mgmt not help - feels like when had meniscal tear on left   Plan: XR Knee Right 3 Views, MR Knee Right w/o         Contrast        Recommend imaging and then go from there. .mvimsk    (M67.40) Ganglion cyst  Comment: small left hand   Plan: Reassurance and monitor since not symptomatic much.             No follow-ups on file.    Seymour Alcantar MD  [facility] [place]

## 2019-09-20 ENCOUNTER — ANCILLARY PROCEDURE (OUTPATIENT)
Dept: GENERAL RADIOLOGY | Facility: OTHER | Age: 39
End: 2019-09-20
Attending: FAMILY MEDICINE
Payer: COMMERCIAL

## 2019-09-20 ENCOUNTER — OFFICE VISIT (OUTPATIENT)
Dept: FAMILY MEDICINE | Facility: OTHER | Age: 39
End: 2019-09-20
Attending: FAMILY MEDICINE
Payer: COMMERCIAL

## 2019-09-20 VITALS
WEIGHT: 148 LBS | HEART RATE: 70 BPM | SYSTOLIC BLOOD PRESSURE: 102 MMHG | BODY MASS INDEX: 24.63 KG/M2 | TEMPERATURE: 97.8 F | OXYGEN SATURATION: 98 % | DIASTOLIC BLOOD PRESSURE: 60 MMHG

## 2019-09-20 DIAGNOSIS — G89.29 CHRONIC PAIN OF RIGHT KNEE: Primary | ICD-10-CM

## 2019-09-20 DIAGNOSIS — M25.561 CHRONIC PAIN OF RIGHT KNEE: ICD-10-CM

## 2019-09-20 DIAGNOSIS — G89.29 CHRONIC PAIN OF RIGHT KNEE: ICD-10-CM

## 2019-09-20 DIAGNOSIS — M25.561 CHRONIC PAIN OF RIGHT KNEE: Primary | ICD-10-CM

## 2019-09-20 DIAGNOSIS — M67.40 GANGLION CYST: ICD-10-CM

## 2019-09-20 PROCEDURE — 73562 X-RAY EXAM OF KNEE 3: CPT | Mod: TC,RT

## 2019-09-20 PROCEDURE — G0463 HOSPITAL OUTPT CLINIC VISIT: HCPCS | Mod: 25

## 2019-09-20 PROCEDURE — 99213 OFFICE O/P EST LOW 20 MIN: CPT | Performed by: FAMILY MEDICINE

## 2019-09-20 PROCEDURE — 73562 X-RAY EXAM OF KNEE 3: CPT | Mod: TC | Performed by: FAMILY MEDICINE

## 2019-09-20 ASSESSMENT — ANXIETY QUESTIONNAIRES
1. FEELING NERVOUS, ANXIOUS, OR ON EDGE: SEVERAL DAYS
5. BEING SO RESTLESS THAT IT IS HARD TO SIT STILL: NOT AT ALL
GAD7 TOTAL SCORE: 3
3. WORRYING TOO MUCH ABOUT DIFFERENT THINGS: SEVERAL DAYS
2. NOT BEING ABLE TO STOP OR CONTROL WORRYING: NOT AT ALL
7. FEELING AFRAID AS IF SOMETHING AWFUL MIGHT HAPPEN: NOT AT ALL
4. TROUBLE RELAXING: NOT AT ALL
6. BECOMING EASILY ANNOYED OR IRRITABLE: SEVERAL DAYS

## 2019-09-20 ASSESSMENT — PATIENT HEALTH QUESTIONNAIRE - PHQ9: SUM OF ALL RESPONSES TO PHQ QUESTIONS 1-9: 1

## 2019-09-20 ASSESSMENT — PAIN SCALES - GENERAL: PAINLEVEL: NO PAIN (1)

## 2019-09-20 NOTE — NURSING NOTE
"Chief Complaint   Patient presents with     Musculoskeletal Problem     Mass       Initial /60   Pulse 70   Temp 97.8  F (36.6  C) (Tympanic)   Wt 67.1 kg (148 lb)   SpO2 98%   BMI 24.63 kg/m   Estimated body mass index is 24.63 kg/m  as calculated from the following:    Height as of 9/16/19: 1.651 m (5' 5\").    Weight as of this encounter: 67.1 kg (148 lb).  Medication Reconciliation: complete  Gracie Queen LPN  "

## 2019-09-21 ASSESSMENT — ANXIETY QUESTIONNAIRES: GAD7 TOTAL SCORE: 3

## 2019-09-23 ENCOUNTER — HOSPITAL ENCOUNTER (OUTPATIENT)
Dept: MRI IMAGING | Facility: HOSPITAL | Age: 39
Discharge: HOME | End: 2019-09-23
Attending: FAMILY MEDICINE | Admitting: FAMILY MEDICINE
Payer: COMMERCIAL

## 2019-09-23 DIAGNOSIS — M25.561 CHRONIC PAIN OF RIGHT KNEE: ICD-10-CM

## 2019-09-23 DIAGNOSIS — G89.29 CHRONIC PAIN OF RIGHT KNEE: ICD-10-CM

## 2019-09-23 PROCEDURE — 73721 MRI JNT OF LWR EXTRE W/O DYE: CPT | Mod: TC,RT

## 2019-09-24 DIAGNOSIS — S83.209A TEAR MENISCUS KNEE: Primary | ICD-10-CM

## 2019-09-24 DIAGNOSIS — M25.561 RIGHT KNEE PAIN: ICD-10-CM

## 2019-09-24 DIAGNOSIS — S83.231A COMPLEX TEAR OF MEDIAL MENISCUS OF RIGHT KNEE AS CURRENT INJURY, INITIAL ENCOUNTER: ICD-10-CM

## 2019-09-29 DIAGNOSIS — F42.9 OBSESSIVE-COMPULSIVE DISORDER, UNSPECIFIED TYPE: ICD-10-CM

## 2019-09-29 DIAGNOSIS — M79.7 FIBROMYALGIA: ICD-10-CM

## 2019-09-29 DIAGNOSIS — F41.1 GAD (GENERALIZED ANXIETY DISORDER): ICD-10-CM

## 2019-10-01 RX ORDER — DULOXETIN HYDROCHLORIDE 30 MG/1
CAPSULE, DELAYED RELEASE ORAL
Qty: 180 CAPSULE | Refills: 0 | Status: SHIPPED | OUTPATIENT
Start: 2019-10-01 | End: 2019-12-11

## 2019-10-01 RX ORDER — CLONAZEPAM 1 MG/1
TABLET ORAL
Qty: 60 TABLET | Refills: 0 | Status: SHIPPED | OUTPATIENT
Start: 2019-10-01 | End: 2019-11-11

## 2019-10-01 NOTE — TELEPHONE ENCOUNTER
Klonopin      Last Written Prescription Date:  8/6/19  Last Fill Quantity: 60,   # refills: 0  Last Office Visit: 9/20/19  Future Office visit:       Routing refill request to provider for review/approval because:  Drug not on the [facility], [facility] or [facility] refill protocol or controlled substance

## 2019-10-02 ENCOUNTER — HEALTH MAINTENANCE LETTER (OUTPATIENT)
Age: 39
End: 2019-10-02

## 2019-10-02 ENCOUNTER — TRANSFERRED RECORDS (OUTPATIENT)
Dept: HEALTH INFORMATION MANAGEMENT | Facility: CLINIC | Age: 39
End: 2019-10-02

## 2019-10-10 ENCOUNTER — OFFICE VISIT (OUTPATIENT)
Dept: OBGYN | Facility: OTHER | Age: 39
End: 2019-10-10
Attending: ADVANCED PRACTICE MIDWIFE
Payer: COMMERCIAL

## 2019-10-10 VITALS
HEIGHT: 65 IN | DIASTOLIC BLOOD PRESSURE: 62 MMHG | WEIGHT: 143 LBS | BODY MASS INDEX: 23.82 KG/M2 | SYSTOLIC BLOOD PRESSURE: 100 MMHG

## 2019-10-10 DIAGNOSIS — R35.0 URINARY FREQUENCY: ICD-10-CM

## 2019-10-10 DIAGNOSIS — R30.0 DYSURIA: Primary | ICD-10-CM

## 2019-10-10 DIAGNOSIS — N81.89 PELVIC FLOOR WEAKNESS: ICD-10-CM

## 2019-10-10 PROBLEM — Z30.46 NEXPLANON REMOVAL: Status: RESOLVED | Noted: 2019-09-16 | Resolved: 2019-10-10

## 2019-10-10 LAB
ALBUMIN UR-MCNC: NEGATIVE MG/DL
APPEARANCE UR: CLEAR
BACTERIA #/AREA URNS HPF: ABNORMAL /HPF
BILIRUB UR QL STRIP: NEGATIVE
COLOR UR AUTO: YELLOW
GLUCOSE UR STRIP-MCNC: NEGATIVE MG/DL
HGB UR QL STRIP: ABNORMAL
KETONES UR STRIP-MCNC: NEGATIVE MG/DL
LEUKOCYTE ESTERASE UR QL STRIP: NEGATIVE
NITRATE UR QL: NEGATIVE
NON-SQ EPI CELLS #/AREA URNS LPF: ABNORMAL /LPF
PH UR STRIP: 6 PH (ref 5–7)
RBC #/AREA URNS AUTO: ABNORMAL /HPF
SOURCE: ABNORMAL
SP GR UR STRIP: <=1.005 (ref 1–1.03)
UROBILINOGEN UR STRIP-ACNC: 0.2 EU/DL (ref 0.2–1)
WBC #/AREA URNS AUTO: ABNORMAL /HPF

## 2019-10-10 PROCEDURE — 81001 URINALYSIS AUTO W/SCOPE: CPT | Mod: ZL | Performed by: ADVANCED PRACTICE MIDWIFE

## 2019-10-10 PROCEDURE — 99213 OFFICE O/P EST LOW 20 MIN: CPT | Performed by: ADVANCED PRACTICE MIDWIFE

## 2019-10-10 PROCEDURE — G0463 HOSPITAL OUTPT CLINIC VISIT: HCPCS

## 2019-10-10 RX ORDER — CHLORAL HYDRATE 500 MG
CAPSULE ORAL
COMMUNITY
End: 2019-12-11

## 2019-10-10 RX ORDER — PNV 119/IRON FUM/FOLIC ACID 29 MG-1 MG
TABLET ORAL
COMMUNITY
Start: 2017-10-23 | End: 2019-10-28

## 2019-10-10 ASSESSMENT — MIFFLIN-ST. JEOR: SCORE: 1319.52

## 2019-10-10 ASSESSMENT — PAIN SCALES - GENERAL: PAINLEVEL: NO PAIN (1)

## 2019-10-10 NOTE — PROGRESS NOTES
"Joaquina Perez is a 39 year old female  Here today for urinary frequency for the past few months.  Reports needing to urinate every hour or less.  Occasional nocturia.  Reports some stress incontinence.  Denies urgency.      O:   /62 (BP Location: Left arm, Patient Position: Chair, Cuff Size: Adult Regular)   Ht 1.651 m (5' 5\")   Wt 64.9 kg (143 lb)   BMI 23.80 kg/m     Pleasant without acute distress.    A:    Urinary frequency  Mild stress incontinence  Hx of PT treatment for pelvic floor weakness  Declines PT at this time    P:    Offered PT for pelvic floor  UA with micro reflex to culture  Urologist referral    Total visit greater than 15 minutes with 15 minutes spent face to face counseling this patient on urinary frequency, stress incontinence, nocturia, bladder training, pelvic floor weakness, PT for pelvic floor strengthening, urologist referral.    Rene Hurley, MANASA, CNM    "

## 2019-10-10 NOTE — PATIENT INSTRUCTIONS
Return to office for well woman care and as needed.    Thank you for allowing Rene GOEL CNM and our OB team to participate in your care.  If you have a scheduling or an appointment question please contact [facility] Coordinator at their direct line [phone].   ALL nursing questions or concerns can be directed to your OB nurse at: [phone] Cassy Mendoza/Nelsy

## 2019-10-10 NOTE — NURSING NOTE
"Chief Complaint   Patient presents with     Urinary Problem       Initial /62 (BP Location: Left arm, Patient Position: Chair, Cuff Size: Adult Regular)   Ht 1.651 m (5' 5\")   Wt 64.9 kg (143 lb)   BMI 23.80 kg/m   Estimated body mass index is 23.8 kg/m  as calculated from the following:    Height as of this encounter: 1.651 m (5' 5\").    Weight as of this encounter: 64.9 kg (143 lb).  Medication Reconciliation: complete  Kelly Garner LPN    "

## 2019-10-28 ENCOUNTER — OFFICE VISIT (OUTPATIENT)
Dept: UROLOGY | Facility: OTHER | Age: 39
End: 2019-10-28
Attending: UROLOGY
Payer: COMMERCIAL

## 2019-10-28 VITALS
OXYGEN SATURATION: 96 % | SYSTOLIC BLOOD PRESSURE: 92 MMHG | HEART RATE: 87 BPM | RESPIRATION RATE: 16 BRPM | TEMPERATURE: 98.1 F | DIASTOLIC BLOOD PRESSURE: 58 MMHG

## 2019-10-28 DIAGNOSIS — N81.89 PELVIC FLOOR WEAKNESS: ICD-10-CM

## 2019-10-28 DIAGNOSIS — R35.0 URINARY FREQUENCY: ICD-10-CM

## 2019-10-28 PROCEDURE — 99203 OFFICE O/P NEW LOW 30 MIN: CPT | Mod: 25 | Performed by: UROLOGY

## 2019-10-28 PROCEDURE — 51798 US URINE CAPACITY MEASURE: CPT | Performed by: UROLOGY

## 2019-10-28 PROCEDURE — 51798 US URINE CAPACITY MEASURE: CPT

## 2019-10-28 PROCEDURE — G0463 HOSPITAL OUTPT CLINIC VISIT: HCPCS

## 2019-10-28 PROCEDURE — 88108 CYTOPATH CONCENTRATE TECH: CPT | Mod: TC | Performed by: UROLOGY

## 2019-10-28 ASSESSMENT — PAIN SCALES - GENERAL: PAINLEVEL: MILD PAIN (2)

## 2019-10-28 NOTE — PROGRESS NOTES
History     Chief Complaint:    Consult (Per Xavi) and Urinary Frequency      HPI   Joaquina Perez is a 39 year old female who presents with significant urgency frequency.  Margarette states for about the last 6 months she is noted that she is gone to the bathroom very frequently she was told by a coworker that she is urinating all the time.  She voids every 45 minutes to an hour.  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  She drinks 16 to 20 ounces of water down coffee but it is caffeinated in the day she does drink four 8 ounce glasses of water at work.  She also uses some artificial sweeteners.  She gets up 0-1 time at night.  Occasionally she has a little bit of a leak which up with the cough but never leaks with urge.  She was started on Cymbalta about a year and a half ago but that did not change her frequency.  She gets an occasional urinary tract infection and occasionally has some dysuria or discomfort in the urethra not just when she is urinating but at any time.  She denies any visible blood she has been menstruating more table at this point she her bowels are okay.  She denies any difficulty voiding but her sensation to void comes back very quickly after she has voided.  She denies any history of kidney stone or flank pain.    Allergies:    Allergies   Allergen Reactions     No Known Drug Allergies         Medications:      Acetaminophen (TYLENOL PO)  clonazePAM (KLONOPIN) 1 MG tablet  DULoxetine (CYMBALTA) 30 MG capsule  fish oil-omega-3 fatty acids 1000 MG capsule  omega-3 acid ethyl esters (LOVAZA) 1 G capsule  Prenatal Vit-Fe Fumarate-FA (PRENATAL MULTIVITAMIN  PLUS IRON) 27-0.8 MG TABS        Problem List:      Patient Active Problem List    Diagnosis Date Noted     Urinary frequency 10/10/2019     Priority: Medium     Fibromyalgia 09/18/2018     Priority: Medium      Other obsessive-compulsive disorders 2018     Priority: Medium     Psychosomatic disorder 2018     Priority: Medium     Delivered by  section 2018     Priority: Medium     Bong       Thrombocytopenia during pregnancy (H) 2018     Priority: Medium     143,153       Fibroid uterus 10/31/2017     Priority: Medium     anterior       Adjustment disorder with mixed anxiety and depressed mood 2017     Priority: Medium     PEYTON (generalized anxiety disorder) 2017     Priority: Medium     Bilateral leg pain 2017     Priority: Medium     DNS (deviated nasal septum) 2016     Priority: Medium     History of closed fracture of nasal bones 2016     Priority: Medium     distant, no surgery       Well woman exam with routine gynecological exam 2015     Priority: Medium        Past Medical History:      Past Medical History:   Diagnosis Date     ACUTE STRESS REACT NOS 2004       Past Surgical History:      Past Surgical History:   Procedure Laterality Date     C REPAIR CRUCIATE LIGAMENT,KNEE      left knee      SECTION  2009      SECTION N/A 3/2/2018    Procedure:  SECTION;;  Surgeon: Christopher Paez MD;  Location: [facility]     HC ARTHROTOMY W/OPEN MENISCUS REPAIR      left knee        Family History:      Family History   Problem Relation Age of Onset     Family History Negative Mother      Thyroid Disease Mother      Family History Negative Father        Social History:    Marital Status:   [2]  Social History     Tobacco Use     Smoking status: Former Smoker     Packs/day: 0.50     Years: 5.00     Pack years: 2.50     Types: Cigarettes     Last attempt to quit: 2004     Years since quitting: 15.4     Smokeless tobacco: Never Used     Tobacco comment: Quit ~ 2009   Substance Use Topics     Alcohol use: Yes     Comment: occasional glass of wine     Drug use: No   Patient smoked for approximately 12  years.  She has had 2 children both C-sections no vaginal deliveries    Review of Systems   All other systems reviewed and are negative.        Physical Exam   Vitals:  BP 92/58   Pulse 87   Temp 98.1  F (36.7  C) (Tympanic)   Resp 16   SpO2 96%       Physical Exam  Constitutional:       Appearance: Normal appearance. She is normal weight.   Abdominal:      General: Abdomen is flat. There is no distension.      Palpations: Abdomen is soft. There is no mass.      Tenderness: There is no tenderness.      Hernia: No hernia is present. There is no hernia in the right inguinal area or left inguinal area.   Genitourinary:     Exam position: Lithotomy position.      Labia:         Right: No rash or tenderness.         Left: No rash or tenderness.       Uretha: Urethral pain present. No prolapse, urethral swelling or urethral lesion.      Vagina: Normal.      Rectum: Normal. No mass.      Comments: On exam of her pelvic floor she is tender on the urethra and very tender on bimanual exam.  It gives her a strong urge to void.  I do not appreciate any masses or lesions and there is no prolapse.  She has a fair pelvic floor contraction.  External rectal area is normal normal rectal tone and no rectal masses.  Lymphadenopathy:      Lower Body: No right inguinal adenopathy. No left inguinal adenopathy.   Neurological:      Mental Status: She is alert.       Impression: Urgency frequency    Plan   Plan: I did proceed with a urine cytology to rule out any concerning abnormalities as the patient does have a history of smoking.  If that comes back Normal in any way then we will need to look in her bladder.  At this time I recommend cutting back on any artificial sweeteners decreasing her caffeine intake, physical therapy for behavioral training and to learn how to abort some of her urgency frequency symptoms.  We did discuss anticholinergics other medications as well as Botox and InterStim but we will be managing this  conservatively at this time.  If she does not see improvement then she can return.  Patient does have a history of anxiety which often times goes with this problem as well and we discussed that at length.      No follow-ups on file.    Aiyana Yadav MD  [facility] [place]

## 2019-10-28 NOTE — LETTER
Joaquina Perez   [address] [facility] [address]        Dear Joaquina     I am writing you concerning your recent test results:    Results for orders placed or performed in visit on 10/28/19   Cytology non gyn     Status: None   Result Value Ref Range    Copath Report       Patient Name: JOAQUINA CANADA  MR#: [id number]  Specimen #: [id number]  Collected: 10/28/2019  Received: 10/29/2019  Reported: 10/30/2019 14:40  Ordering Phy(s): CAMILA AMADO  Additional Phy(s): CARLEE NOBLE    For improved result formatting, select 'View Enhanced Report Format' under   Linked Documents section.    SPECIMEN/STAIN PROCESS:  Urine, voided.       Pap-Cyto x 4    ----------------------------------------------------------------    CYTOLOGIC INTERPRETATION:     Urine, voided.:   Negative for malignancy  Specimen Adequacy: Satisfactory for evaluation.    Electronically signed out by:    Francisco Mcmillan M.D.    CLINICAL HISTORY:  Urinary frequency.    ,    GROSS:    Urine, voided.:  55cc yellow fluid.  4 pap stained cytospin slides were processed.    CPT Codes:  A: 33740-QYM    COLLECTION SITE:  Client:  [facility]  Location:  [facility] (B)    The technical component of this testing was completed at the [facility], with the  profess ional component performed at the [facility], [address] [place] [id number]  ([phone])         Resulted Orders   Cytology non gyn   Result Value Ref Range    Copath Report       Patient Name: JOAQUINA CANADA  MR#: [id number]  Specimen #: [id number]  Collected: 10/28/2019  Received: 10/29/2019  Reported: 10/30/2019 14:40  Ordering Phy(s): CAMILA AMADO  Additional Phy(s): CARLEE NOBLE    For improved result formatting, select 'View Enhanced Report Format' under   Linked Documents section.    SPECIMEN/STAIN PROCESS:  Urine, voided.       Pap-Cyto x  4    ----------------------------------------------------------------    CYTOLOGIC INTERPRETATION:     Urine, voided.:   Negative for malignancy  Specimen Adequacy: Satisfactory for evaluation.    Electronically signed out by:    Francisco Mcmillan M.D.    CLINICAL HISTORY:  Urinary frequency.    ,    GROSS:    Urine, voided.:  55cc yellow fluid.  4 pap stained cytospin slides were processed.    CPT Codes:  A: 33087-KGP    COLLECTION SITE:  Client:  [facility]  [facility]    The technical component of this testing was completed at the [facility], with the  profess ional component performed at the [facility], [address] [place] [address], [place] [address]  ([phone])           Enjoyed the urine test that I sent for cancer cells came back negative          Sincerely,      Aiyana Yadav MD        [facility] [place] [address] [address]  [facility] [address]  Phone: [phone]

## 2019-10-28 NOTE — LETTER
Joaquina Perez   [address] [facility] [address]        Dear Joaquina     I am writing you concerning your recent test results:    Results for orders placed or performed in visit on 10/28/19   Cytology non gyn   Result Value Ref Range    Copath Report       Patient Name: JOAQUINA CANADA  MR#: [id number]  Specimen #: [id number]  Collected: 10/28/2019  Received: 10/29/2019  Reported: 10/30/2019 14:40  Ordering Phy(s): CAMILA AMADO  Additional Phy(s): CARLEE NOBLE    For improved result formatting, select 'View Enhanced Report Format' under   Linked Documents section.    SPECIMEN/STAIN PROCESS:  Urine, voided.       Pap-Cyto x 4    ----------------------------------------------------------------    CYTOLOGIC INTERPRETATION:     Urine, voided.:   Negative for malignancy  Specimen Adequacy: Satisfactory for evaluation.    Electronically signed out by:    Francisco Mcmillan M.D.    CLINICAL HISTORY:  Urinary frequency.    ,    GROSS:    Urine, voided.:  55cc yellow fluid.  4 pap stained cytospin slides were processed.    CPT Codes:  A: 81190-BLL    COLLECTION SITE:  Client:  [facility]  Location:  [facility] (B)    The technical component of this testing was completed at the [facility], with the  profess ional component performed at the [facility], [address] [place] [id number]  ([phone])         Resulted Orders   Cytology non gyn   Result Value Ref Range    Copath Report       Patient Name: JOAQUINA CANADA  MR#: [id number]  Specimen #: [id number]  Collected: 10/28/2019  Received: 10/29/2019  Reported: 10/30/2019 14:40  Ordering Phy(s): CAMILA AMADO  Additional Phy(s): CARLEE NOBLE    For improved result formatting, select 'View Enhanced Report Format' under   Linked Documents section.    SPECIMEN/STAIN PROCESS:  Urine, voided.       Pap-Cyto x 4    ----------------------------------------------------------------    CYTOLOGIC  See ultrasound report for details of ultrasound evaluation/consultation  and recommendations.      INTERPRETATION:     Urine, voided.:   Negative for malignancy  Specimen Adequacy: Satisfactory for evaluation.    Electronically signed out by:    Francisco Mcmillan M.D.    CLINICAL HISTORY:  Urinary frequency.    ,    GROSS:    Urine, voided.:  55cc yellow fluid.  4 pap stained cytospin slides were processed.    CPT Codes:  A: 86384-VGD    COLLECTION SITE:  Client:  [facility]  Location:  [facility] (B)    The technical component of this testing was completed at the [facility], with the  profess ional component performed at the [facility], [address] [place] [address], [place] [address]  ([phone])           Joaquina I sent some urine to check for cancer cells and that came back negative.      Please let me know if you have any questions.      Sincerely,      Aiyana Yadav MD        [facility]  [address]  [facility] [address]  Phone: [phone]

## 2019-10-28 NOTE — NURSING NOTE
"Chief Complaint   Patient presents with     Consult     Per Xavi     Urinary Frequency       Initial BP 92/58   Pulse 87   Temp 98.1  F (36.7  C) (Tympanic)   Resp 16   SpO2 96%  Estimated body mass index is 23.8 kg/m  as calculated from the following:    Height as of 10/10/19: 1.651 m (5' 5\").    Weight as of 10/10/19: 64.9 kg (143 lb).  Medication Reconciliation: complete   Review of Systems:    Weight loss:    No     Recent fever/chills:  No   Night sweats:   No  Current skin rash:  No   Recent hair loss:  No  Heat intolerance:  No   Cold intolerance:  No  Chest pain:   No   Palpitations:   No  Shortness of breath:  No   Wheezing:   No  Constipation:    No   Diarrhea:   No   Nausea:   No   Vomiting:   No   Kidney/side pain:  No   Back pain:   No  Frequent headaches:  No   Dizziness:     No  Leg swelling:   No   Calf pain:    No    Parents, brothers or sisters with history of kidney cancer:   No  Parents, brothers or sisters with history of bladder cancer: No    Nicole Palacios LPN    "

## 2019-10-30 LAB — COPATH REPORT: NORMAL

## 2019-11-11 DIAGNOSIS — F41.1 GAD (GENERALIZED ANXIETY DISORDER): ICD-10-CM

## 2019-11-11 DIAGNOSIS — F42.9 OBSESSIVE-COMPULSIVE DISORDER, UNSPECIFIED TYPE: ICD-10-CM

## 2019-11-12 RX ORDER — CLONAZEPAM 1 MG/1
TABLET ORAL
Qty: 60 TABLET | Refills: 0 | Status: SHIPPED | OUTPATIENT
Start: 2019-11-12 | End: 2019-12-11

## 2019-11-12 NOTE — TELEPHONE ENCOUNTER
clonazePAM (KLONOPIN) 1 MG tablet      Last Written Prescription Date:  10/1/19  Last Fill Quantity: 60,   # refills: 0  Last Office Visit: 9/20/19  Future Office visit:       Routing refill request to provider for review/approval because:  Not on protocol. Please advise. Thank you!

## 2019-12-02 NOTE — PROGRESS NOTES
[facility] [place]  [address]  [place] [address]  [phone]  Dept: [phone]    PRE-OP EVALUATION:  Today's date: 2019    Joaquina Perez (: 1980) presents for pre-operative evaluation assessment as requested by Dr. Cerda.  She requires evaluation and anesthesia risk assessment prior to undergoing surgery/procedure for treatment of right knee meniscus tear .    Proposed Surgery/ Procedure: arthroscopy right knee  Date of Surgery/ Procedure: 19  Time of Surgery/ Procedure: 830  Hospital/Surgical Facility: [facility]    Primary Physician: Seymour Alcantar  Type of Anesthesia Anticipated: to be determined    Patient has a Health Care Directive or Living Will:  NO    1. NO - Do you have a history of heart attack, stroke, stent, bypass or surgery on an artery in the head, neck, heart or legs?  2. NO - Do you ever have any pain or discomfort in your chest?  3. NO - Do you have a history of  Heart Failure?  4. NO - Are you troubled by shortness of breath when: walking on the level, up a slight hill or at night?  5. NO - Do you currently have a cold, bronchitis or other respiratory infection?  6. NO - Do you have a cough, shortness of breath or wheezing?  7. NO - Do you sometimes get pains in the calves of your legs when you walk?  8. YES - DO YOU OR ANYONE IN YOUR FAMILY HAVE PREVIOUS HISTORY OF BLOOD CLOTS? First cousin  from pulmonary emboli after developing DVT  9. NO - Do you or does anyone in your family have a serious bleeding problem such as prolonged bleeding following surgeries or cuts?  10. NO - Have you ever had problems with anemia or been told to take iron pills?  11. NO - Have you had any abnormal blood loss such as black, tarry or bloody stools, or abnormal vaginal bleeding?  12. NO - Have you ever had a blood transfusion?  13. NO - Have you or any of your relatives ever had problems with anesthesia?  14. NO - Do you have sleep apnea,  excessive snoring or daytime drowsiness?  15. NO - Do you have any prosthetic heart valves?  16. NO - Do you have prosthetic joints?  17. NO - Is there any chance that you may be pregnant?      HPI:     HPI related to upcoming procedure:   38 yO female  presents for cardiopulmonary/general clearance to undergo the above procedure.  His surgeon listed above has asked for this clearance to undergo anesthesia. Pt has had this condition for approximately several months .   Overall pt is of good health and has not had any perioperative complications with previous surgeries.          Exam: MR KNEE RIGHT W/O CONTRAST     HISTORY:  Mechanical knee symptoms: locking, catching, snapping,  crepitus; Chronic pain of right knee; Chronic pain of right knee.      Technique: Axial, coronal and sagittal images were obtained with  combination of T1, proton density and T2-weighted images.     Findings: Anterior and posterior cruciate ligaments are intact as are  medial and lateral collateral ligaments and the extensor mechanism.     There is no lateral meniscal tear. There is some superficial fissuring  and cartilage over the lateral tibial plateau but no subchondral  marrow changes are seen.     The medial meniscus is abnormal. There is a fairly large horizontal  tear through the posterior horn and body of the meniscus. There is  some mild thinning of cartilage without subchondral marrow changes.     There is some thinning of articular cartilage over the medial patellar  facet at the level of the mid patella. No definite full-thickness loss  is seen and there is no subchondral marrow change. Cartilage in the  trochlear groove is fairly well preserved. There is no significant  joint effusion. There is no popliteal cyst.                                                                           Impression: Large horizontal tear posterior horn and body of medial  meniscus.     MITCH BRISCOE MD         MEDICAL HISTORY:     Patient  Active Problem List    Diagnosis Date Noted     Urinary frequency 10/10/2019     Priority: Medium     Fibromyalgia 2018     Priority: Medium     Other obsessive-compulsive disorders 2018     Priority: Medium     Psychosomatic disorder 2018     Priority: Medium     Delivered by  section 2018     Priority: Medium     Philippe       Thrombocytopenia during pregnancy (H) 2018     Priority: Medium     143,153       Fibroid uterus 10/31/2017     Priority: Medium     anterior       Adjustment disorder with mixed anxiety and depressed mood 2017     Priority: Medium     PEYTON (generalized anxiety disorder) 2017     Priority: Medium     Bilateral leg pain 2017     Priority: Medium     DNS (deviated nasal septum) 2016     Priority: Medium     History of closed fracture of nasal bones 2016     Priority: Medium     distant, no surgery       Well woman exam with routine gynecological exam 2015     Priority: Medium      Past Medical History:   Diagnosis Date     ACUTE STRESS REACT NOS 2004     Past Surgical History:   Procedure Laterality Date     C REPAIR CRUCIATE LIGAMENT,KNEE      left knee      SECTION  2009      SECTION N/A 3/2/2018    Procedure:  SECTION;;  Surgeon: Christopher Paez MD;  Location: [facility]      ARTHROTOMY W/OPEN MENISCUS REPAIR      left knee      Current Outpatient Medications   Medication Sig Dispense Refill     Acetaminophen (TYLENOL PO) Take 500 mg by mouth       clonazePAM (KLONOPIN) 1 MG tablet TAKE 1/2 TO 1 TABLET BY MOUTH TWICE DAILY AS NEEDED FOR ANXIETY 60 tablet 0     DULoxetine (CYMBALTA) 30 MG capsule TAKE 1 CAPSULE BY MOUTH TWICE DAILY 180 capsule 0     fish oil-omega-3 fatty acids 1000 MG capsule        omega-3 acid ethyl esters (LOVAZA) 1 G capsule Take 2 g by mouth 2 times daily       Prenatal Vit-Fe Fumarate-FA (PRENATAL MULTIVITAMIN  PLUS IRON) 27-0.8 MG TABS Take 1 tablet by mouth  "daily       OTC products: None, except as noted above    Allergies   Allergen Reactions     No Known Drug Allergies       Latex Allergy: NO    Social History     Tobacco Use     Smoking status: Former Smoker     Packs/day: 0.50     Years: 5.00     Pack years: 2.50     Types: Cigarettes     Last attempt to quit: 5/16/2004     Years since quitting: 15.5     Smokeless tobacco: Never Used     Tobacco comment: Quit ~ March 1, 2009   Substance Use Topics     Alcohol use: Yes     Comment: occasional glass of wine     History   Drug Use No       REVIEW OF SYSTEMS:   Constitutional, neuro, ENT, endocrine, pulmonary, cardiac, gastrointestinal, genitourinary, musculoskeletal, integument and psychiatric systems are negative, except as otherwise noted.    EXAM:   /60   Pulse 84   Temp 98.6  F (37  C)   Ht 1.651 m (5' 5\")   Wt 67.6 kg (149 lb)   SpO2 96%   BMI 24.79 kg/m      GENERAL APPEARANCE: healthy, alert and no distress     EYES: EOMI, PERRL     HENT: ear canals and TM's normal and nose and mouth without ulcers or lesions     NECK: no adenopathy, no asymmetry, masses, or scars and thyroid normal to palpation     RESP: lungs clear to auscultation - no rales, rhonchi or wheezes     CV: regular rates and rhythm, normal S1 S2, no S3 or S4 and no murmur, click or rub     ABDOMEN:  soft, nontender, no HSM or masses and bowel sounds normal     MS: extremities normal- no gross deformities noted, no evidence of inflammation in joints, FROM in all extremities.     SKIN: no suspicious lesions or rashes     NEURO: Normal strength and tone, sensory exam grossly normal, mentation intact and speech normal     PSYCH: mentation appears normal. and affect normal/bright     LYMPHATICS: No cervical adenopathy    DIAGNOSTICS:     Results for orders placed or performed in visit on 12/11/19   HCG qualitative     Status: None   Result Value Ref Range    HCG Qualitative Serum Negative NEG^Negative   Basic metabolic panel     Status: " None   Result Value Ref Range    Sodium 140 133 - 144 mmol/L    Potassium 3.9 3.4 - 5.3 mmol/L    Chloride 106 94 - 109 mmol/L    Carbon Dioxide 27 20 - 32 mmol/L    Anion Gap 7 3 - 14 mmol/L    Glucose 83 70 - 99 mg/dL    Urea Nitrogen 26 7 - 30 mg/dL    Creatinine 0.99 0.52 - 1.04 mg/dL    GFR Estimate 71 >60 mL/min/[1.73_m2]    GFR Estimate If Black 83 >60 mL/min/[1.73_m2]    Calcium 8.6 8.5 - 10.1 mg/dL   CBC with platelets differential     Status: None   Result Value Ref Range    WBC 7.6 4.0 - 11.0 10e9/L    RBC Count 3.96 3.8 - 5.2 10e12/L    Hemoglobin 12.4 11.7 - 15.7 g/dL    Hematocrit 36.7 35.0 - 47.0 %    MCV 93 78 - 100 fl    MCH 31.3 26.5 - 33.0 pg    MCHC 33.8 31.5 - 36.5 g/dL    RDW 11.9 10.0 - 15.0 %    Platelet Count 236 150 - 450 10e9/L    Diff Method Automated Method     % Neutrophils 60.5 %    % Lymphocytes 31.5 %    % Monocytes 5.9 %    % Eosinophils 1.4 %    % Basophils 0.4 %    % Immature Granulocytes 0.3 %    Nucleated RBCs 0 0 /100    Absolute Neutrophil 4.6 1.6 - 8.3 10e9/L    Absolute Lymphocytes 2.4 0.8 - 5.3 10e9/L    Absolute Monocytes 0.5 0.0 - 1.3 10e9/L    Absolute Eosinophils 0.1 0.0 - 0.7 10e9/L    Absolute Basophils 0.0 0.0 - 0.2 10e9/L    Abs Immature Granulocytes 0.0 0 - 0.4 10e9/L    Absolute Nucleated RBC 0.0          Recent Labs   Lab Test 09/05/18  1042 07/09/18  0833 03/05/18  0603 03/03/18  0550 03/02/18  1549 03/02/18  1144  09/20/17  1545  12/21/16  1113   HGB  --  13.7  --  9.6*  --  Canceled, Test credited  11.9   < > 11.7   < > 12.8   PLT  --  256 149*  --  136* 150   < > 201   < > 212   INR  --   --   --   --   --  0.90  --   --   --   --    NA  --  142  --   --   --   --   --  137   < >  --    POTASSIUM  --  4.0  --   --   --   --   --  3.5   < >  --    CR  --  0.90  --   --  0.50*  --   --  0.58   < >  --    A1C 4.8  --   --   --   --   --   --   --   --  4.6    < > = values in this interval not displayed.        IMPRESSION:   Reason for surgery/procedure: Right  knee meniscal tear  Diagnosis/reason for consult: General clearance     The proposed surgical procedure is considered LOW risk.    REVISED CARDIAC RISK INDEX  The patient has the following serious cardiovascular risks for perioperative complications such as (MI, PE, VFib and 3  AV Block):  No serious cardiac risks  INTERPRETATION: 1 risks: Class II (low risk - 0.9% complication rate)    The patient has the following additional risks for perioperative complications:  No identified additional risks      ICD-10-CM    1. Preop general physical exam Z01.818 CBC with platelets differential     Basic metabolic panel     HCG qualitative   2. Complex tear of medial meniscus of right knee as current injury, subsequent encounter S83.231D    3. PEYTON (generalized anxiety disorder) F41.1 clonazePAM (KLONOPIN) 1 MG tablet     DULoxetine (CYMBALTA) 30 MG capsule   4. Other obsessive-compulsive disorders F42.8    5. Fibromyalgia M79.7 DULoxetine (CYMBALTA) 30 MG capsule   6. Obsessive-compulsive disorder, unspecified type F42.9 clonazePAM (KLONOPIN) 1 MG tablet       RECOMMENDATIONS:     Pt real anxious with surgery / FH of DVT though pt never had any DVT or surgical complications. Pt is selling house and moving to [place]-- lots reassurance given.  3 months rf on meds given. Pt has done wonderfull with her PEYTON /FM on current meds.  Pt flaring some with lots on plate.    Pt will do well and reassurance given. Pt has phone number if needs anything in transition to [place].   Will miss her in my practice- she is very compliant good pt.          APPROVAL GIVEN to proceed with proposed procedure, without further diagnostic evaluation       Signed Electronically by: Seymour Alcantar MD    Copy of this evaluation report is provided to requesting physician.    [place] Preop Guidelines    Revised Cardiac Risk Index

## 2019-12-10 ENCOUNTER — OFFICE VISIT (OUTPATIENT)
Dept: CHIROPRACTIC MEDICINE | Facility: OTHER | Age: 39
End: 2019-12-10
Attending: CHIROPRACTOR
Payer: COMMERCIAL

## 2019-12-10 DIAGNOSIS — M99.01 SEGMENTAL AND SOMATIC DYSFUNCTION OF CERVICAL REGION: ICD-10-CM

## 2019-12-10 DIAGNOSIS — M99.03 SEGMENTAL AND SOMATIC DYSFUNCTION OF LUMBAR REGION: Primary | ICD-10-CM

## 2019-12-10 DIAGNOSIS — M54.50 ACUTE BILATERAL LOW BACK PAIN WITHOUT SCIATICA: ICD-10-CM

## 2019-12-10 DIAGNOSIS — M99.02 SEGMENTAL AND SOMATIC DYSFUNCTION OF THORACIC REGION: ICD-10-CM

## 2019-12-10 PROCEDURE — 98941 CHIROPRACT MANJ 3-4 REGIONS: CPT | Mod: AT | Performed by: CHIROPRACTOR

## 2019-12-10 NOTE — PROGRESS NOTES
Subjective Finding:    Chief compalint: Patient presents with:  Back Pain  Neck Pain  , Pain Scale: 6/10, Intensity: sharp, Duration: 1 years, Radiating: no.    Date of injury:     Activities that the pain restricts:   Home/household/hobbies/social activities: yes.  Work duties: yes.  Sleep: yes.  Makes symptoms better: rest.  Makes symptoms worse: sitting.  Have you seen anyone else for the symptoms? Yes: MD and GEORGETTE.  Work related: no.  Automobile related injury: no.    Objective and Assessment:    Posture Analysis:   High shoulder: .  Head tilt: .  High iliac crest: .  Head carriage: neutral.  Thoracic Kyphosis: forward.  Lumbar Lordosis: forward.    Lumbar Range of Motion: extension decreased.  Cervical Range of Motion: extension decreased.  Thoracic Range of Motion: extension decreased.  Extremity Range of Motion: .    Palpation:   Quad lumb: bilateral, referred pain: no  Traps: sharp pain, referred pain: no  C spine tightness    Segmental dysfunction pre-treatment and treatment area: L45  T78.   C45  Assessment post-treatment:  Cervical: ROM increased.  Thoracic: ROM increased.  Lumbar: ROM increased.    Comments: DDd of lumbar area.      Complicating Factors: structural abnormalities.    Procedure(s):  CMT:  21136 Chiropractic manipulative treatment 3-4 regions performed   Cervical: Diversified, See above for level, Supine, Thoracic: Diversified, See above for level, Prone and Lumbar: Diversified, See above for level, Side posture    Modalities:  None performed this visit    Therapeutic procedures:  None    Plan:  Treatment plan: PRN.  Instructed patient: stretch as instructed at visit.  Short term goals: increase ROM.  Long term goals: pain releif.  Prognosis: very good.

## 2019-12-11 ENCOUNTER — TELEPHONE (OUTPATIENT)
Dept: FAMILY MEDICINE | Facility: OTHER | Age: 39
End: 2019-12-11

## 2019-12-11 ENCOUNTER — OFFICE VISIT (OUTPATIENT)
Dept: FAMILY MEDICINE | Facility: OTHER | Age: 39
End: 2019-12-11
Attending: FAMILY MEDICINE
Payer: COMMERCIAL

## 2019-12-11 VITALS
HEART RATE: 84 BPM | TEMPERATURE: 98.6 F | OXYGEN SATURATION: 96 % | BODY MASS INDEX: 24.83 KG/M2 | DIASTOLIC BLOOD PRESSURE: 60 MMHG | HEIGHT: 65 IN | WEIGHT: 149 LBS | SYSTOLIC BLOOD PRESSURE: 100 MMHG

## 2019-12-11 DIAGNOSIS — Z01.818 PREOP GENERAL PHYSICAL EXAM: Primary | ICD-10-CM

## 2019-12-11 DIAGNOSIS — Z01.818 PREOP GENERAL PHYSICAL EXAM: ICD-10-CM

## 2019-12-11 DIAGNOSIS — F41.1 GAD (GENERALIZED ANXIETY DISORDER): ICD-10-CM

## 2019-12-11 DIAGNOSIS — F42.8 OTHER OBSESSIVE-COMPULSIVE DISORDERS: ICD-10-CM

## 2019-12-11 DIAGNOSIS — M79.7 FIBROMYALGIA: ICD-10-CM

## 2019-12-11 DIAGNOSIS — F42.9 OBSESSIVE-COMPULSIVE DISORDER, UNSPECIFIED TYPE: ICD-10-CM

## 2019-12-11 DIAGNOSIS — S83.231D COMPLEX TEAR OF MEDIAL MENISCUS OF RIGHT KNEE AS CURRENT INJURY, SUBSEQUENT ENCOUNTER: ICD-10-CM

## 2019-12-11 LAB
ANION GAP SERPL CALCULATED.3IONS-SCNC: 7 MMOL/L (ref 3–14)
BASOPHILS # BLD AUTO: 0 10E9/L (ref 0–0.2)
BASOPHILS NFR BLD AUTO: 0.4 %
BUN SERPL-MCNC: 26 MG/DL (ref 7–30)
CALCIUM SERPL-MCNC: 8.6 MG/DL (ref 8.5–10.1)
CHLORIDE SERPL-SCNC: 106 MMOL/L (ref 94–109)
CO2 SERPL-SCNC: 27 MMOL/L (ref 20–32)
CREAT SERPL-MCNC: 0.99 MG/DL (ref 0.52–1.04)
DIFFERENTIAL METHOD BLD: NORMAL
EOSINOPHIL # BLD AUTO: 0.1 10E9/L (ref 0–0.7)
EOSINOPHIL NFR BLD AUTO: 1.4 %
ERYTHROCYTE [DISTWIDTH] IN BLOOD BY AUTOMATED COUNT: 11.9 % (ref 10–15)
GFR SERPL CREATININE-BSD FRML MDRD: 71 ML/MIN/{1.73_M2}
GLUCOSE SERPL-MCNC: 83 MG/DL (ref 70–99)
HCG SERPL QL: NEGATIVE
HCT VFR BLD AUTO: 36.7 % (ref 35–47)
HGB BLD-MCNC: 12.4 G/DL (ref 11.7–15.7)
IMM GRANULOCYTES # BLD: 0 10E9/L (ref 0–0.4)
IMM GRANULOCYTES NFR BLD: 0.3 %
LYMPHOCYTES # BLD AUTO: 2.4 10E9/L (ref 0.8–5.3)
LYMPHOCYTES NFR BLD AUTO: 31.5 %
MCH RBC QN AUTO: 31.3 PG (ref 26.5–33)
MCHC RBC AUTO-ENTMCNC: 33.8 G/DL (ref 31.5–36.5)
MCV RBC AUTO: 93 FL (ref 78–100)
MONOCYTES # BLD AUTO: 0.5 10E9/L (ref 0–1.3)
MONOCYTES NFR BLD AUTO: 5.9 %
NEUTROPHILS # BLD AUTO: 4.6 10E9/L (ref 1.6–8.3)
NEUTROPHILS NFR BLD AUTO: 60.5 %
NRBC # BLD AUTO: 0 10*3/UL
NRBC BLD AUTO-RTO: 0 /100
PLATELET # BLD AUTO: 236 10E9/L (ref 150–450)
POTASSIUM SERPL-SCNC: 3.9 MMOL/L (ref 3.4–5.3)
RBC # BLD AUTO: 3.96 10E12/L (ref 3.8–5.2)
SODIUM SERPL-SCNC: 140 MMOL/L (ref 133–144)
WBC # BLD AUTO: 7.6 10E9/L (ref 4–11)

## 2019-12-11 PROCEDURE — 85025 COMPLETE CBC W/AUTO DIFF WBC: CPT | Mod: ZL | Performed by: FAMILY MEDICINE

## 2019-12-11 PROCEDURE — 36415 COLL VENOUS BLD VENIPUNCTURE: CPT | Performed by: FAMILY MEDICINE

## 2019-12-11 PROCEDURE — G0463 HOSPITAL OUTPT CLINIC VISIT: HCPCS

## 2019-12-11 PROCEDURE — 84703 CHORIONIC GONADOTROPIN ASSAY: CPT | Mod: ZL | Performed by: FAMILY MEDICINE

## 2019-12-11 PROCEDURE — 36415 COLL VENOUS BLD VENIPUNCTURE: CPT | Mod: ZL | Performed by: FAMILY MEDICINE

## 2019-12-11 PROCEDURE — 85025 COMPLETE CBC W/AUTO DIFF WBC: CPT | Performed by: FAMILY MEDICINE

## 2019-12-11 PROCEDURE — 80048 BASIC METABOLIC PNL TOTAL CA: CPT | Mod: ZL | Performed by: FAMILY MEDICINE

## 2019-12-11 PROCEDURE — 84703 CHORIONIC GONADOTROPIN ASSAY: CPT | Performed by: FAMILY MEDICINE

## 2019-12-11 PROCEDURE — 99214 OFFICE O/P EST MOD 30 MIN: CPT | Performed by: FAMILY MEDICINE

## 2019-12-11 PROCEDURE — 80048 BASIC METABOLIC PNL TOTAL CA: CPT | Performed by: FAMILY MEDICINE

## 2019-12-11 RX ORDER — CLONAZEPAM 1 MG/1
TABLET ORAL
Qty: 180 TABLET | Refills: 0 | Status: SHIPPED | OUTPATIENT
Start: 2019-12-11 | End: 2020-01-08

## 2019-12-11 RX ORDER — DULOXETIN HYDROCHLORIDE 30 MG/1
30 CAPSULE, DELAYED RELEASE ORAL 2 TIMES DAILY
Qty: 180 CAPSULE | Refills: 2 | Status: SHIPPED | OUTPATIENT
Start: 2019-12-11 | End: 2020-01-08

## 2019-12-11 ASSESSMENT — PAIN SCALES - GENERAL: PAINLEVEL: NO PAIN (0)

## 2019-12-11 ASSESSMENT — MIFFLIN-ST. JEOR: SCORE: 1346.74

## 2019-12-11 NOTE — NURSING NOTE
"Chief Complaint   Patient presents with     Pre-Op Exam       Initial /60   Pulse 84   Temp 98.6  F (37  C)   Ht 1.651 m (5' 5\")   Wt 67.6 kg (149 lb)   SpO2 96%   BMI 24.79 kg/m   Estimated body mass index is 24.79 kg/m  as calculated from the following:    Height as of this encounter: 1.651 m (5' 5\").    Weight as of this encounter: 67.6 kg (149 lb).  Medication Reconciliation: complete  Kelvin Serra LPN  "

## 2019-12-27 ENCOUNTER — TRANSFERRED RECORDS (OUTPATIENT)
Dept: HEALTH INFORMATION MANAGEMENT | Facility: CLINIC | Age: 39
End: 2019-12-27

## 2020-01-07 DIAGNOSIS — F42.9 OBSESSIVE-COMPULSIVE DISORDER, UNSPECIFIED TYPE: ICD-10-CM

## 2020-01-07 DIAGNOSIS — M79.7 FIBROMYALGIA: ICD-10-CM

## 2020-01-07 DIAGNOSIS — F41.1 GAD (GENERALIZED ANXIETY DISORDER): ICD-10-CM

## 2020-01-07 RX ORDER — CLONAZEPAM 1 MG/1
TABLET ORAL
Qty: 60 TABLET | Status: CANCELLED | OUTPATIENT
Start: 2020-01-07

## 2020-01-08 RX ORDER — DULOXETIN HYDROCHLORIDE 30 MG/1
30 CAPSULE, DELAYED RELEASE ORAL 2 TIMES DAILY
Qty: 180 CAPSULE | Refills: 2 | Status: SHIPPED | OUTPATIENT
Start: 2020-01-08

## 2020-01-08 RX ORDER — CLONAZEPAM 1 MG/1
TABLET ORAL
Qty: 60 TABLET | Refills: 1 | Status: SHIPPED | OUTPATIENT
Start: 2020-01-08 | End: 2020-03-23

## 2020-01-08 RX ORDER — CLONAZEPAM 1 MG/1
TABLET ORAL
Qty: 30 TABLET | Refills: 0 | OUTPATIENT
Start: 2020-01-08

## 2020-01-08 NOTE — TELEPHONE ENCOUNTER
DULoxetine (CYMBALTA) 30 MG capsule  Last visit date with prescribing provider: 12-  Last refill date: 12-  Quantity: 180, Refills: 2    Jennifer Forrest LPN

## 2020-01-08 NOTE — TELEPHONE ENCOUNTER
Klonopin       Last Written Prescription Date:  12/11/19  Last Fill Quantity: 180,   # refills: 0  Last Office Visit: 12/11/19  Future Office visit:       Routing refill request to provider for review/approval because:  Drug not on the [facility], [facility] or  Health refill protocol or controlled substance     show last fill 12/13/19 dispense 60

## 2020-03-23 DIAGNOSIS — F42.9 OBSESSIVE-COMPULSIVE DISORDER, UNSPECIFIED TYPE: ICD-10-CM

## 2020-03-23 DIAGNOSIS — F41.1 GAD (GENERALIZED ANXIETY DISORDER): ICD-10-CM

## 2020-03-23 RX ORDER — CLONAZEPAM 1 MG/1
TABLET ORAL
Qty: 60 TABLET | Refills: 1 | Status: SHIPPED | OUTPATIENT
Start: 2020-03-23

## 2020-03-23 NOTE — TELEPHONE ENCOUNTER
Has appointment on April 1st with OB for annual physical, but not sure if she will be able to keep with corona virus, does not need refill at this time just got refilled on 3/8 and has not used any. Hopefully at April appointment she will have referral to someone who will prescribe klonopin for her.

## 2020-03-23 NOTE — TELEPHONE ENCOUNTER
Klonopin      Last Written Prescription Date:  1/8/20  Last Fill Quantity: 60,   # refills: 1  Last Office Visit: 12/11/19  Future Office visit:       Routing refill request to provider for review/approval because:

## 2020-04-20 ENCOUNTER — TELEPHONE (OUTPATIENT)
Dept: FAMILY MEDICINE | Facility: OTHER | Age: 40
End: 2020-04-20

## 2021-01-15 ENCOUNTER — HEALTH MAINTENANCE LETTER (OUTPATIENT)
Age: 41
End: 2021-01-15

## 2021-09-05 ENCOUNTER — HEALTH MAINTENANCE LETTER (OUTPATIENT)
Age: 41
End: 2021-09-05

## 2022-02-20 ENCOUNTER — HEALTH MAINTENANCE LETTER (OUTPATIENT)
Age: 42
End: 2022-02-20

## 2022-10-23 ENCOUNTER — HEALTH MAINTENANCE LETTER (OUTPATIENT)
Age: 42
End: 2022-10-23

## 2023-04-01 ENCOUNTER — HEALTH MAINTENANCE LETTER (OUTPATIENT)
Age: 43
End: 2023-04-01

## 2023-12-29 NOTE — ADDENDUM NOTE
Addended by: KATHYA REIS on: 4/10/2017 09:54 AM     Modules accepted: Orders     - history of htn   - on amlodipine 10 daily at home

## (undated) DEVICE — APPLICATOR-CHLORAPREP 26ML TINTED CHG 2%+ 70% IPA-SURGICAL

## (undated) DEVICE — GOWN-SURG XL LVL 4 IMPERVIOUS

## (undated) DEVICE — CATH TRAY-16FR METER W/STATLOCK LATEX]

## (undated) DEVICE — SUTURE-VICRYL 2-0 CT J357H

## (undated) DEVICE — SCD SLEEVE-KNEE REG.

## (undated) DEVICE — SUTURE-VICRYL 3-0 CT J356H

## (undated) DEVICE — GLV-7.0 PROTEXIS PI CLASSIC LF/PF

## (undated) DEVICE — TAPE-MEDIPORE 4" X 2YD

## (undated) DEVICE — DRSG-SPONGE STERILE 8 X 4

## (undated) DEVICE — SUTURE-VICRYL 1 CTX J371H

## (undated) DEVICE — IRRIGATION-H2O 1000ML

## (undated) DEVICE — IRRIGATION-NACL 1000ML

## (undated) DEVICE — SYRINGE-ASEPTO IRRIGATION

## (undated) DEVICE — PACK-C-SECTION-CUSTOM

## (undated) DEVICE — SURGICAL BINDER-ABDOMINAL 55-72

## (undated) DEVICE — CAUTERY PAD-POLYHESIVE II ADULT

## (undated) DEVICE — LIGHT HANDLE COVER

## (undated) RX ORDER — KETOROLAC TROMETHAMINE 30 MG/ML
INJECTION, SOLUTION INTRAMUSCULAR; INTRAVENOUS
Status: DISPENSED
Start: 2018-03-02

## (undated) RX ORDER — DEXAMETHASONE SODIUM PHOSPHATE 10 MG/ML
INJECTION, SOLUTION INTRAMUSCULAR; INTRAVENOUS
Status: DISPENSED
Start: 2018-03-02

## (undated) RX ORDER — SUFENTANIL CITRATE 50 UG/ML
INJECTION EPIDURAL; INTRAVENOUS
Status: DISPENSED
Start: 2018-03-02

## (undated) RX ORDER — EPHEDRINE SULFATE 50 MG/ML
INJECTION, SOLUTION INTRAMUSCULAR; INTRAVENOUS; SUBCUTANEOUS
Status: DISPENSED
Start: 2018-03-02

## (undated) RX ORDER — OXYTOCIN/0.9 % SODIUM CHLORIDE 30/500 ML
PLASTIC BAG, INJECTION (ML) INTRAVENOUS
Status: DISPENSED
Start: 2018-03-02

## (undated) RX ORDER — ONDANSETRON 2 MG/ML
INJECTION INTRAMUSCULAR; INTRAVENOUS
Status: DISPENSED
Start: 2018-03-02

## (undated) RX ORDER — PHENYLEPHRINE HCL IN 0.9% NACL 1 MG/10 ML
SYRINGE (ML) INTRAVENOUS
Status: DISPENSED
Start: 2018-03-02